# Patient Record
Sex: MALE | Race: WHITE | NOT HISPANIC OR LATINO | Employment: FULL TIME | URBAN - METROPOLITAN AREA
[De-identification: names, ages, dates, MRNs, and addresses within clinical notes are randomized per-mention and may not be internally consistent; named-entity substitution may affect disease eponyms.]

---

## 2017-01-07 RX ORDER — VALACYCLOVIR HYDROCHLORIDE 500 MG/1
TABLET, FILM COATED ORAL
Qty: 6 TABLET | Refills: 2 | Status: SHIPPED | OUTPATIENT
Start: 2017-01-07 | End: 2017-05-21 | Stop reason: SDUPTHER

## 2017-01-10 ENCOUNTER — HOSPITAL ENCOUNTER (OUTPATIENT)
Dept: SLEEP MEDICINE | Facility: HOSPITAL | Age: 52
Discharge: HOME | End: 2017-01-10
Attending: PSYCHIATRY & NEUROLOGY | Admitting: PSYCHIATRY & NEUROLOGY

## 2017-01-10 PROCEDURE — G0463 HOSPITAL OUTPT CLINIC VISIT: HCPCS

## 2017-01-13 RX ORDER — SITAGLIPTIN AND METFORMIN HYDROCHLORIDE 1000; 50 MG/1; MG/1
TABLET, FILM COATED ORAL
Qty: 60 TABLET | Refills: 5 | Status: SHIPPED | OUTPATIENT
Start: 2017-01-13 | End: 2017-07-30 | Stop reason: SDUPTHER

## 2017-01-18 NOTE — PROGRESS NOTES
DATE OF VISIT:   01/10/2017    HISTORY:  Patient has severe obstructive sleep apnea syndrome.  Polysomnography in the past has revealed apnea-hypopnea index of 94 and minimum SpO2 of 69%. Patient was hypoxic for 55% of the study time. The patient had hypersomnia with [place] Sleepiness Scale score of 11.     The patient was successfully treated with CPAP therapy and the patient is now on auto CPAP therapy with mean pressure of 8.9 cmH20 and AHI down to 1.9. Compliance data indicates excellent compliance with 96.7% usage for more than 4 hours with an average usage of 7 hours and 11 minutes. The patient is compliant and benefiting from it. The patient's [place] Sleepiness Scale score is down to 3.     PHYSICAL EXAMINATION:  VITAL SIGNS: Weight 283 pounds, height 5 feet 10 inches, body mass index 41, blood pressure 120/75, heart rate 70.   HEENT: Narrow oropharynx. Mallampati class III   NECK: Supple. No bruits.   CARDIAC: Normal.   LUNGS: Clear to auscultation.   EXTREMITIES: No edema.     IMPRESSION: Patient with severe obstructive sleep apnea syndrome successfully treated with auto CPAP therapy and the patient is compliant and benefiting from it.     RECOMMENDATIONS: Continue present auto CPAP. Followup 1 year.       BYRON Dooley:rae  D:   01/17/2017 12:30:18  T:   01/18/2017 09:59:09  Job ID:   [id number]  Document ID:   [id number]  cc:

## 2017-01-19 RX ORDER — GLIMEPIRIDE 1 MG/1
TABLET ORAL
Qty: 30 TABLET | Refills: 5 | Status: SHIPPED | OUTPATIENT
Start: 2017-01-19 | End: 2017-02-15 | Stop reason: SDUPTHER

## 2017-02-10 DIAGNOSIS — I15.9 SECONDARY HYPERTENSION: ICD-10-CM

## 2017-02-10 DIAGNOSIS — E11.9 CONTROLLED TYPE 2 DIABETES MELLITUS WITHOUT COMPLICATION, WITHOUT LONG-TERM CURRENT USE OF INSULIN (HCC): ICD-10-CM

## 2017-02-10 DIAGNOSIS — E78.5 HYPERLIPIDEMIA, UNSPECIFIED HYPERLIPIDEMIA TYPE: ICD-10-CM

## 2017-02-10 DIAGNOSIS — E78.5 HYPERLIPIDEMIA, UNSPECIFIED HYPERLIPIDEMIA TYPE: Primary | ICD-10-CM

## 2017-02-13 ENCOUNTER — CONVERSION ENCOUNTER (OUTPATIENT)
Dept: FAMILY MEDICINE CLINIC | Facility: CLINIC | Age: 52
End: 2017-02-13

## 2017-02-13 LAB
ALBUMIN SERPL-MCNC: 4.6 G/DL (ref 3.5–5.2)
ALBUMIN/GLOB SERPL: 1.8 G/DL
ALP SERPL-CCNC: 47 U/L (ref 39–117)
ALT SERPL-CCNC: 30 U/L (ref 1–41)
AST SERPL-CCNC: 18 U/L (ref 1–40)
BASOPHILS # BLD AUTO: 0.03 10*3/MM3 (ref 0–0.2)
BASOPHILS NFR BLD AUTO: 0.3 % (ref 0–1.5)
BILIRUB SERPL-MCNC: 0.6 MG/DL (ref 0.1–1.2)
BUN SERPL-MCNC: 16 MG/DL (ref 6–20)
BUN/CREAT SERPL: 15.8 (ref 7–25)
CALCIUM SERPL-MCNC: 9.6 MG/DL (ref 8.6–10.5)
CHLORIDE SERPL-SCNC: 98 MMOL/L (ref 98–107)
CHOLEST SERPL-MCNC: 183 MG/DL (ref 0–200)
CO2 SERPL-SCNC: 27.5 MMOL/L (ref 22–29)
CREAT SERPL-MCNC: 1.01 MG/DL (ref 0.76–1.27)
EOSINOPHIL # BLD AUTO: 0.27 10*3/MM3 (ref 0–0.7)
EOSINOPHIL NFR BLD AUTO: 3.1 % (ref 0.3–6.2)
ERYTHROCYTE [DISTWIDTH] IN BLOOD BY AUTOMATED COUNT: 12.9 % (ref 11.5–14.5)
GLOBULIN SER CALC-MCNC: 2.6 GM/DL
GLUCOSE SERPL-MCNC: 152 MG/DL (ref 65–99)
HBA1C MFR BLD: 7.02 % (ref 4.8–5.6)
HCT VFR BLD AUTO: 44.1 % (ref 40.4–52.2)
HDLC SERPL-MCNC: 84 MG/DL (ref 40–60)
HGB BLD-MCNC: 14.5 G/DL (ref 13.7–17.6)
IMM GRANULOCYTES # BLD: 0.03 10*3/MM3 (ref 0–0.03)
IMM GRANULOCYTES NFR BLD: 0.3 % (ref 0–0.5)
LDLC SERPL CALC-MCNC: 63 MG/DL (ref 0–100)
LDLC/HDLC SERPL: 0.75 {RATIO}
LYMPHOCYTES # BLD AUTO: 2.32 10*3/MM3 (ref 0.9–4.8)
LYMPHOCYTES NFR BLD AUTO: 26.6 % (ref 19.6–45.3)
MCH RBC QN AUTO: 28.9 PG (ref 27–32.7)
MCHC RBC AUTO-ENTMCNC: 32.9 G/DL (ref 32.6–36.4)
MCV RBC AUTO: 88 FL (ref 79.8–96.2)
MONOCYTES # BLD AUTO: 0.72 10*3/MM3 (ref 0.2–1.2)
MONOCYTES NFR BLD AUTO: 8.3 % (ref 5–12)
NEUTROPHILS # BLD AUTO: 5.34 10*3/MM3 (ref 1.9–8.1)
NEUTROPHILS NFR BLD AUTO: 61.4 % (ref 42.7–76)
PLATELET # BLD AUTO: 243 10*3/MM3 (ref 140–500)
POTASSIUM SERPL-SCNC: 4.5 MMOL/L (ref 3.5–5.2)
PROT SERPL-MCNC: 7.2 G/DL (ref 6–8.5)
RBC # BLD AUTO: 5.01 10*6/MM3 (ref 4.6–6)
SODIUM SERPL-SCNC: 137 MMOL/L (ref 136–145)
TRIGL SERPL-MCNC: 180 MG/DL (ref 0–150)
VLDLC SERPL CALC-MCNC: 36 MG/DL (ref 5–40)
WBC # BLD AUTO: 8.71 10*3/MM3 (ref 4.5–10.7)

## 2017-02-15 ENCOUNTER — OFFICE VISIT (OUTPATIENT)
Dept: FAMILY MEDICINE CLINIC | Facility: CLINIC | Age: 52
End: 2017-02-15

## 2017-02-15 VITALS
OXYGEN SATURATION: 97 % | HEART RATE: 87 BPM | DIASTOLIC BLOOD PRESSURE: 72 MMHG | SYSTOLIC BLOOD PRESSURE: 122 MMHG | TEMPERATURE: 97.9 F | WEIGHT: 291 LBS | HEIGHT: 70 IN | BODY MASS INDEX: 41.66 KG/M2

## 2017-02-15 DIAGNOSIS — E11.9 TYPE 2 DIABETES MELLITUS WITHOUT COMPLICATION, WITHOUT LONG-TERM CURRENT USE OF INSULIN (HCC): Primary | ICD-10-CM

## 2017-02-15 DIAGNOSIS — R74.8 ELEVATED LIVER ENZYMES: ICD-10-CM

## 2017-02-15 DIAGNOSIS — I10 ESSENTIAL HYPERTENSION: ICD-10-CM

## 2017-02-15 DIAGNOSIS — E78.5 HYPERLIPIDEMIA, UNSPECIFIED HYPERLIPIDEMIA TYPE: ICD-10-CM

## 2017-02-15 PROCEDURE — 99214 OFFICE O/P EST MOD 30 MIN: CPT | Performed by: INTERNAL MEDICINE

## 2017-02-15 RX ORDER — GLIMEPIRIDE 2 MG/1
2 TABLET ORAL
Qty: 30 TABLET | Refills: 5 | Status: SHIPPED | OUTPATIENT
Start: 2017-02-15 | End: 2017-06-09 | Stop reason: ALTCHOICE

## 2017-02-15 NOTE — PROGRESS NOTES
Subjective   Rashid Henderson is a 51 y.o. male who comes in today for   Chief Complaint   Patient presents with   • Diabetes     follow up   .    History of Present Illness   Here for f/u on NIDDM and has had a dry cough since he got sick over new years.  Cough has improved slightly with the mucinex dm.  Voice is raspy.  Gets somewhat out of breath and then he coughs.  His muscles were sore at one point.  Sniffling.  No fever.  Clear RN.    The following portions of the patient's history were reviewed and updated as appropriate: allergies, current medications, past family history, past medical history, past social history, past surgical history and problem list.    Review of Systems   Constitutional: Negative for fever.   HENT: Positive for rhinorrhea.    Respiratory: Positive for cough. Negative for shortness of breath.    Endocrine:        Sugars running 150-160's at home       Vitals:    02/15/17 0906   BP: 122/72   Pulse: 87   Temp: 97.9 °F (36.6 °C)   SpO2: 97%       Objective   Physical Exam   Constitutional: He is oriented to person, place, and time. He appears well-developed and well-nourished.   HENT:   Head: Normocephalic and atraumatic.   Right Ear: External ear normal.   Left Ear: External ear normal.   Mouth/Throat: Oropharynx is clear and moist.   Eyes: Conjunctivae are normal.   Neck: Neck supple.   Cardiovascular: Normal rate, regular rhythm and normal heart sounds.    Pulmonary/Chest: Effort normal and breath sounds normal.   Abdominal: Soft. Bowel sounds are normal.   Neurological: He is alert and oriented to person, place, and time.   Skin: Skin is warm.   Psychiatric: He has a normal mood and affect. His behavior is normal. Judgment and thought content normal.   Nursing note and vitals reviewed.      Assessment/Plan   Rashid was seen today for diabetes.    Diagnoses and all orders for this visit:    Type 2 diabetes mellitus without complication, without long-term current use of insulin    Essential  hypertension    Hyperlipidemia, unspecified hyperlipidemia type    Elevated liver enzymes    Other orders  -     glimepiride (AMARYL) 2 MG tablet; Take 1 tablet by mouth Every Morning Before Breakfast.    dulera 100/5 2 puffs bid trial for the cough and if it doesn't help, we will start zithromax  Increase the amaryl to 2mg qd and continue monitoring his sugars.  He will call me in a few weeks if they are still elevated and will increase the amaryl to 4mg qd  Diet and exercise discussed at length and he is aware of necessary changes  Labs reviewed and copy given to patient               I have asked for the patient to return to clinic in 4month(s).

## 2017-05-23 RX ORDER — VALACYCLOVIR HYDROCHLORIDE 500 MG/1
TABLET, FILM COATED ORAL
Qty: 6 TABLET | Refills: 2 | Status: SHIPPED | OUTPATIENT
Start: 2017-05-23 | End: 2017-10-02 | Stop reason: SDUPTHER

## 2017-06-02 DIAGNOSIS — I10 ESSENTIAL HYPERTENSION: Primary | ICD-10-CM

## 2017-06-02 DIAGNOSIS — I10 ESSENTIAL HYPERTENSION: ICD-10-CM

## 2017-06-02 DIAGNOSIS — E78.5 HYPERLIPIDEMIA, UNSPECIFIED HYPERLIPIDEMIA TYPE: ICD-10-CM

## 2017-06-02 DIAGNOSIS — E11.9 TYPE 2 DIABETES MELLITUS WITHOUT COMPLICATION, WITHOUT LONG-TERM CURRENT USE OF INSULIN (HCC): ICD-10-CM

## 2017-06-06 LAB
ALBUMIN SERPL-MCNC: 4.1 G/DL (ref 3.5–5.2)
ALBUMIN/GLOB SERPL: 1.4 G/DL
ALP SERPL-CCNC: 38 U/L (ref 39–117)
ALT SERPL-CCNC: 47 U/L (ref 1–41)
AST SERPL-CCNC: 24 U/L (ref 1–40)
BASOPHILS # BLD AUTO: 0.02 10*3/MM3 (ref 0–0.2)
BASOPHILS NFR BLD AUTO: 0.3 % (ref 0–1.5)
BILIRUB SERPL-MCNC: 0.5 MG/DL (ref 0.1–1.2)
BUN SERPL-MCNC: 18 MG/DL (ref 6–20)
BUN/CREAT SERPL: 18.6 (ref 7–25)
CALCIUM SERPL-MCNC: 9.5 MG/DL (ref 8.6–10.5)
CHLORIDE SERPL-SCNC: 99 MMOL/L (ref 98–107)
CHOLEST SERPL-MCNC: 179 MG/DL (ref 0–200)
CO2 SERPL-SCNC: 22.7 MMOL/L (ref 22–29)
CREAT SERPL-MCNC: 0.97 MG/DL (ref 0.76–1.27)
EOSINOPHIL # BLD AUTO: 0.23 10*3/MM3 (ref 0–0.7)
EOSINOPHIL NFR BLD AUTO: 3.7 % (ref 0.3–6.2)
ERYTHROCYTE [DISTWIDTH] IN BLOOD BY AUTOMATED COUNT: 13.4 % (ref 11.5–14.5)
GLOBULIN SER CALC-MCNC: 2.9 GM/DL
GLUCOSE SERPL-MCNC: 127 MG/DL (ref 65–99)
HBA1C MFR BLD: 7.1 % (ref 4.8–5.6)
HCT VFR BLD AUTO: 42.3 % (ref 40.4–52.2)
HDLC SERPL-MCNC: 89 MG/DL (ref 40–60)
HGB BLD-MCNC: 14.2 G/DL (ref 13.7–17.6)
IMM GRANULOCYTES # BLD: 0.02 10*3/MM3 (ref 0–0.03)
IMM GRANULOCYTES NFR BLD: 0.3 % (ref 0–0.5)
LDLC SERPL CALC-MCNC: 64 MG/DL (ref 0–100)
LDLC/HDLC SERPL: 0.72 {RATIO}
LYMPHOCYTES # BLD AUTO: 2.2 10*3/MM3 (ref 0.9–4.8)
LYMPHOCYTES NFR BLD AUTO: 35.5 % (ref 19.6–45.3)
MCH RBC QN AUTO: 29.8 PG (ref 27–32.7)
MCHC RBC AUTO-ENTMCNC: 33.6 G/DL (ref 32.6–36.4)
MCV RBC AUTO: 88.9 FL (ref 79.8–96.2)
MONOCYTES # BLD AUTO: 0.49 10*3/MM3 (ref 0.2–1.2)
MONOCYTES NFR BLD AUTO: 7.9 % (ref 5–12)
NEUTROPHILS # BLD AUTO: 3.23 10*3/MM3 (ref 1.9–8.1)
NEUTROPHILS NFR BLD AUTO: 52.3 % (ref 42.7–76)
PLATELET # BLD AUTO: 237 10*3/MM3 (ref 140–500)
POTASSIUM SERPL-SCNC: 4.2 MMOL/L (ref 3.5–5.2)
PROT SERPL-MCNC: 7 G/DL (ref 6–8.5)
RBC # BLD AUTO: 4.76 10*6/MM3 (ref 4.6–6)
SODIUM SERPL-SCNC: 138 MMOL/L (ref 136–145)
TRIGL SERPL-MCNC: 129 MG/DL (ref 0–150)
VLDLC SERPL CALC-MCNC: 25.8 MG/DL (ref 5–40)
WBC # BLD AUTO: 6.19 10*3/MM3 (ref 4.5–10.7)

## 2017-06-09 ENCOUNTER — OFFICE VISIT (OUTPATIENT)
Dept: FAMILY MEDICINE CLINIC | Facility: CLINIC | Age: 52
End: 2017-06-09

## 2017-06-09 VITALS
HEIGHT: 70 IN | BODY MASS INDEX: 41.16 KG/M2 | SYSTOLIC BLOOD PRESSURE: 128 MMHG | WEIGHT: 287.5 LBS | HEART RATE: 68 BPM | OXYGEN SATURATION: 98 % | TEMPERATURE: 98.6 F | DIASTOLIC BLOOD PRESSURE: 80 MMHG | RESPIRATION RATE: 18 BRPM

## 2017-06-09 DIAGNOSIS — R74.8 ELEVATED LIVER ENZYMES: ICD-10-CM

## 2017-06-09 DIAGNOSIS — E78.5 HYPERLIPIDEMIA, UNSPECIFIED HYPERLIPIDEMIA TYPE: ICD-10-CM

## 2017-06-09 DIAGNOSIS — E11.9 TYPE 2 DIABETES MELLITUS WITHOUT COMPLICATION, WITHOUT LONG-TERM CURRENT USE OF INSULIN (HCC): Primary | ICD-10-CM

## 2017-06-09 DIAGNOSIS — I10 ESSENTIAL HYPERTENSION: ICD-10-CM

## 2017-06-09 PROCEDURE — 99214 OFFICE O/P EST MOD 30 MIN: CPT | Performed by: INTERNAL MEDICINE

## 2017-06-09 NOTE — PROGRESS NOTES
Subjective   Rashid Henderson is a 51 y.o. male who comes in today for   Chief Complaint   Patient presents with   • Diabetes   .    History of Present Illness   Here for f/u on NIDDM, HTN, HL.  Does have sweet tooth and has been eating candy.  Not exercising as much as would like to but is still hitting the gym.  Right elbow pain on the outer part mainly with certain movements.  Hasn't hurt in 3 weeks actually.  Also some right shoulder pain that occurs with movement.  Hurts to raise shoulder.  No loss of strength.  Is right handed.  Does play piano often.    The following portions of the patient's history were reviewed and updated as appropriate: allergies, current medications, past family history, past medical history, past social history, past surgical history and problem list.    Review of Systems   Constitutional: Negative.    Musculoskeletal: Positive for arthralgias (right elbow and right shoulder).       Vitals:    06/09/17 0811   BP: 128/80   Pulse: 68   Resp: 18   Temp: 98.6 °F (37 °C)   SpO2: 98%       Objective   Physical Exam   Constitutional: He is oriented to person, place, and time. He appears well-developed and well-nourished.   HENT:   Head: Normocephalic and atraumatic.   Right Ear: External ear normal.   Left Ear: External ear normal.   Mouth/Throat: Oropharynx is clear and moist.   Eyes: Conjunctivae are normal.   Neck: Neck supple.   Cardiovascular: Normal rate, regular rhythm and normal heart sounds.    Pulmonary/Chest: Effort normal and breath sounds normal.   Abdominal: Soft. Bowel sounds are normal.   Neurological: He is alert and oriented to person, place, and time.   Skin: Skin is warm.   Psychiatric: He has a normal mood and affect. His behavior is normal. Judgment and thought content normal.   Nursing note and vitals reviewed.      Assessment/Plan   Rashid was seen today for diabetes.    Diagnoses and all orders for this visit:    Type 2 diabetes mellitus without complication, without  long-term current use of insulin    Hyperlipidemia, unspecified hyperlipidemia type    Essential hypertension    Elevated liver enzymes      a1c increasing and is above goal of 7%.  Will start jardiance 10mg qd due to its additive benefits of weight loss and lowering bp and lowering a1c.  Side effects discussed and he knows to increase his hydration.    Recheck labs in 4 mo  Suggested an elbow velcro strap for presumed lateral epicondylitis  He will let me know about seeing ortho if it or the shoulder worsen  Will have him stop the amaryl to avoid possible hypoglycemia           I have asked for the patient to return to clinic in 4month(s).

## 2017-06-27 ENCOUNTER — TELEPHONE (OUTPATIENT)
Dept: FAMILY MEDICINE CLINIC | Facility: CLINIC | Age: 52
End: 2017-06-27

## 2017-06-27 NOTE — TELEPHONE ENCOUNTER
CAROLINE lopez needed a PA. i completed one through [email or web address]     Approved through 6/27/2018    CaseID: [id number]   [email or web address] id [id number]     Called [facility]- spoke with pharmacist   Also faxed approval

## 2017-07-09 RX ORDER — LISINOPRIL 10 MG/1
TABLET ORAL
Qty: 30 TABLET | Refills: 6 | Status: SHIPPED | OUTPATIENT
Start: 2017-07-09 | End: 2018-02-11 | Stop reason: SDUPTHER

## 2017-07-31 RX ORDER — SITAGLIPTIN AND METFORMIN HYDROCHLORIDE 1000; 50 MG/1; MG/1
TABLET, FILM COATED ORAL
Qty: 60 TABLET | Refills: 5 | Status: SHIPPED | OUTPATIENT
Start: 2017-07-31 | End: 2018-02-14 | Stop reason: SDUPTHER

## 2017-10-03 RX ORDER — VALACYCLOVIR HYDROCHLORIDE 500 MG/1
TABLET, FILM COATED ORAL
Qty: 6 TABLET | Refills: 2 | Status: SHIPPED | OUTPATIENT
Start: 2017-10-03 | End: 2018-11-09 | Stop reason: SDUPTHER

## 2017-10-05 DIAGNOSIS — E11.9 TYPE 2 DIABETES MELLITUS WITHOUT COMPLICATION, WITHOUT LONG-TERM CURRENT USE OF INSULIN (HCC): ICD-10-CM

## 2017-10-05 DIAGNOSIS — E78.5 HYPERLIPIDEMIA, UNSPECIFIED HYPERLIPIDEMIA TYPE: ICD-10-CM

## 2017-10-05 DIAGNOSIS — I15.9 SECONDARY HYPERTENSION: Primary | ICD-10-CM

## 2017-10-05 LAB
ALBUMIN SERPL-MCNC: 4.2 G/DL (ref 3.5–5.2)
ALBUMIN/GLOB SERPL: 1.4 G/DL
ALP SERPL-CCNC: 42 U/L (ref 39–117)
ALT SERPL-CCNC: 35 U/L (ref 1–41)
AST SERPL-CCNC: 19 U/L (ref 1–40)
BASOPHILS # BLD AUTO: 0.03 10*3/MM3 (ref 0–0.2)
BASOPHILS NFR BLD AUTO: 0.5 % (ref 0–1.5)
BILIRUB SERPL-MCNC: 0.5 MG/DL (ref 0.1–1.2)
BUN SERPL-MCNC: 22 MG/DL (ref 6–20)
BUN/CREAT SERPL: 21 (ref 7–25)
CALCIUM SERPL-MCNC: 9.3 MG/DL (ref 8.6–10.5)
CHLORIDE SERPL-SCNC: 99 MMOL/L (ref 98–107)
CHOLEST SERPL-MCNC: 183 MG/DL (ref 0–200)
CO2 SERPL-SCNC: 23.8 MMOL/L (ref 22–29)
CREAT SERPL-MCNC: 1.05 MG/DL (ref 0.76–1.27)
EOSINOPHIL # BLD AUTO: 0.46 10*3/MM3 (ref 0–0.7)
EOSINOPHIL NFR BLD AUTO: 7.7 % (ref 0.3–6.2)
ERYTHROCYTE [DISTWIDTH] IN BLOOD BY AUTOMATED COUNT: 12.6 % (ref 11.5–14.5)
GLOBULIN SER CALC-MCNC: 2.9 GM/DL
GLUCOSE SERPL-MCNC: 146 MG/DL (ref 65–99)
HBA1C MFR BLD: 7.2 % (ref 4.8–5.6)
HCT VFR BLD AUTO: 45.7 % (ref 40.4–52.2)
HDLC SERPL-MCNC: 79 MG/DL (ref 40–60)
HGB BLD-MCNC: 15.1 G/DL (ref 13.7–17.6)
IMM GRANULOCYTES # BLD: 0.02 10*3/MM3 (ref 0–0.03)
IMM GRANULOCYTES NFR BLD: 0.3 % (ref 0–0.5)
LDLC SERPL CALC-MCNC: 69 MG/DL (ref 0–100)
LDLC/HDLC SERPL: 0.87 {RATIO}
LYMPHOCYTES # BLD AUTO: 2.05 10*3/MM3 (ref 0.9–4.8)
LYMPHOCYTES NFR BLD AUTO: 34.2 % (ref 19.6–45.3)
MCH RBC QN AUTO: 30.1 PG (ref 27–32.7)
MCHC RBC AUTO-ENTMCNC: 33 G/DL (ref 32.6–36.4)
MCV RBC AUTO: 91 FL (ref 79.8–96.2)
MONOCYTES # BLD AUTO: 0.43 10*3/MM3 (ref 0.2–1.2)
MONOCYTES NFR BLD AUTO: 7.2 % (ref 5–12)
NEUTROPHILS # BLD AUTO: 3 10*3/MM3 (ref 1.9–8.1)
NEUTROPHILS NFR BLD AUTO: 50.1 % (ref 42.7–76)
PLATELET # BLD AUTO: 204 10*3/MM3 (ref 140–500)
POTASSIUM SERPL-SCNC: 4.4 MMOL/L (ref 3.5–5.2)
PROT SERPL-MCNC: 7.1 G/DL (ref 6–8.5)
RBC # BLD AUTO: 5.02 10*6/MM3 (ref 4.6–6)
SODIUM SERPL-SCNC: 137 MMOL/L (ref 136–145)
TRIGL SERPL-MCNC: 175 MG/DL (ref 0–150)
VLDLC SERPL CALC-MCNC: 35 MG/DL (ref 5–40)
WBC # BLD AUTO: 5.99 10*3/MM3 (ref 4.5–10.7)

## 2017-10-11 ENCOUNTER — OFFICE VISIT (OUTPATIENT)
Dept: FAMILY MEDICINE CLINIC | Facility: CLINIC | Age: 52
End: 2017-10-11

## 2017-10-11 VITALS
RESPIRATION RATE: 18 BRPM | OXYGEN SATURATION: 94 % | HEIGHT: 70 IN | HEART RATE: 84 BPM | SYSTOLIC BLOOD PRESSURE: 130 MMHG | BODY MASS INDEX: 39.6 KG/M2 | WEIGHT: 276.6 LBS | DIASTOLIC BLOOD PRESSURE: 80 MMHG | TEMPERATURE: 98.8 F

## 2017-10-11 DIAGNOSIS — R74.8 ELEVATED LIVER ENZYMES: ICD-10-CM

## 2017-10-11 DIAGNOSIS — E11.9 TYPE 2 DIABETES MELLITUS WITHOUT COMPLICATION, WITHOUT LONG-TERM CURRENT USE OF INSULIN (HCC): Primary | ICD-10-CM

## 2017-10-11 DIAGNOSIS — I10 ESSENTIAL HYPERTENSION: ICD-10-CM

## 2017-10-11 DIAGNOSIS — E78.5 HYPERLIPIDEMIA, UNSPECIFIED HYPERLIPIDEMIA TYPE: ICD-10-CM

## 2017-10-11 PROCEDURE — 99214 OFFICE O/P EST MOD 30 MIN: CPT | Performed by: INTERNAL MEDICINE

## 2017-10-11 NOTE — PROGRESS NOTES
Subjective   Rashid Henderson is a 51 y.o. male who comes in today for   Chief Complaint   Patient presents with   • Diabetes   .    History of Present Illness   Her for f/u on NIDDM with increasing a1c and sugars. He hasn't been exercising.  He has been taking jardiance 10mg qd and janumet 50/1000 2 /day.  Had poison ivy on forearms and thought may be the jardiance so he stopped it for about a month and just restarted the jardiance for past 2 weeks.  -140 in the mornings.  Has lost 11 pounds on the jardiance.  He stopped the amaryl when he started the jardiance.  Does notice the increase in urination.  No nocturia.      The following portions of the patient's history were reviewed and updated as appropriate: allergies, current medications, past family history, past medical history, past social history, past surgical history and problem list.    Review of Systems   Constitutional:        Weight loss from the jardiance   Skin: Negative for rash.       Vitals:    10/11/17 0802   BP: 130/80   Pulse: 84   Resp: 18   Temp: 98.8 °F (37.1 °C)   SpO2: 94%       Objective   Physical Exam   Constitutional: He is oriented to person, place, and time. He appears well-developed and well-nourished.   HENT:   Head: Normocephalic and atraumatic.   Right Ear: External ear normal.   Left Ear: External ear normal.   Mouth/Throat: Oropharynx is clear and moist.   Eyes: Conjunctivae are normal.   Neck: Neck supple.   Cardiovascular: Normal rate, regular rhythm and normal heart sounds.    No bruits   Pulmonary/Chest: Effort normal and breath sounds normal. No respiratory distress. He has no wheezes. He has no rales.   Abdominal: Soft. Bowel sounds are normal. He exhibits no distension and no mass. There is no tenderness.   Lymphadenopathy:     He has no cervical adenopathy.   Neurological: He is alert and oriented to person, place, and time.   Skin: Skin is warm.   Psychiatric: He has a normal mood and affect. His behavior is normal.  Judgment and thought content normal.   Nursing note and vitals reviewed.      Assessment/Plan   Rashid was seen today for diabetes.    Diagnoses and all orders for this visit:    Type 2 diabetes mellitus without complication, without long-term current use of insulin    Essential hypertension    Hyperlipidemia, unspecified hyperlipidemia type    Elevated liver enzymes      Restart exercise. Imperative to do this. Stressed to patient importance of diet and exercise.  He will continue jardiance and janumet for now.  Discussed victoza .  He wants to wait and try diet and exercise first  Labs reveiwed  Continue lisinopril for HTN  Cholesterol to goal without statin other than TG elev this time.  Diet first                 I have asked for the patient to return to clinic in 6month(s).

## 2018-01-09 ENCOUNTER — OFFICE VISIT (OUTPATIENT)
Dept: SLEEP MEDICINE | Facility: HOSPITAL | Age: 53
End: 2018-01-09
Attending: PSYCHIATRY & NEUROLOGY

## 2018-01-09 VITALS
BODY MASS INDEX: 37.94 KG/M2 | DIASTOLIC BLOOD PRESSURE: 77 MMHG | HEIGHT: 71 IN | WEIGHT: 271 LBS | SYSTOLIC BLOOD PRESSURE: 145 MMHG | HEART RATE: 85 BPM

## 2018-01-09 DIAGNOSIS — G47.33 OSA (OBSTRUCTIVE SLEEP APNEA): Primary | ICD-10-CM

## 2018-01-09 DIAGNOSIS — G47.10 HYPERSOMNIA: ICD-10-CM

## 2018-01-09 PROCEDURE — G0463 HOSPITAL OUTPT CLINIC VISIT: HCPCS

## 2018-01-09 NOTE — PROGRESS NOTES
"Rashid Henderson  1965  [id number]     DATE OF VISIT:1/9/2018   HISTORY:  Patient has severe obstructive sleep apnea syndrome.  Polysomnography in the past has revealed apnea-hypopnea index of 94 and minimum SpO2 of 69%. Patient was hypoxic for 55% of the study time. The patient had hypersomnia with [place] Sleepiness Scale score of 11.     The patient was successfully treated with CPAP therapy and the patient is now on auto CPAP therapy with mean pressure of 8.6 cmH20 and AHI down to 1.6. Compliance data indicates excellent compliance with 93.3% usage for more than 4 hours with an average usage of 7 hours and 17 minutes. The patient is compliant and benefiting from it. The patient's [place] Sleepiness Scale score is down to 2.     PHYSICAL EXAMINATION:  Vitals:    01/09/18 0700   BP: 145/77   Pulse: 85   Weight: 123 kg (271 lb)   Height: 179.1 cm (70.5\")   BMI 38.33  HEENT: Narrow oropharynx. Mallampati class III   NECK: Supple. No bruits.   CARDIAC: Normal.   LUNGS: Clear to auscultation.   EXTREMITIES: No edema.     IMPRESSION: Patient with severe obstructive sleep apnea syndrome successfully treated with auto CPAP therapy and the patient is compliant and benefiting from it.     RECOMMENDATIONS: Continue present auto CPAP. Followup 1 year.       Andrew Ni M.D.  1/9/2018         "

## 2018-01-11 RX ORDER — EMPAGLIFLOZIN 10 MG/1
TABLET, FILM COATED ORAL
Qty: 30 TABLET | Refills: 5 | Status: SHIPPED | OUTPATIENT
Start: 2018-01-11 | End: 2018-07-16 | Stop reason: SDUPTHER

## 2018-02-12 RX ORDER — LISINOPRIL 10 MG/1
TABLET ORAL
Qty: 30 TABLET | Refills: 6 | Status: SHIPPED | OUTPATIENT
Start: 2018-02-12 | End: 2018-09-13 | Stop reason: SDUPTHER

## 2018-02-15 RX ORDER — SITAGLIPTIN AND METFORMIN HYDROCHLORIDE 1000; 50 MG/1; MG/1
TABLET, FILM COATED ORAL
Qty: 60 TABLET | Refills: 5 | Status: SHIPPED | OUTPATIENT
Start: 2018-02-15 | End: 2018-12-27 | Stop reason: SDUPTHER

## 2018-02-23 DIAGNOSIS — E11.9 TYPE 2 DIABETES MELLITUS WITHOUT COMPLICATION, WITHOUT LONG-TERM CURRENT USE OF INSULIN (HCC): ICD-10-CM

## 2018-02-23 DIAGNOSIS — I15.9 SECONDARY HYPERTENSION: ICD-10-CM

## 2018-02-23 DIAGNOSIS — E78.5 HYPERLIPIDEMIA, UNSPECIFIED HYPERLIPIDEMIA TYPE: Primary | ICD-10-CM

## 2018-02-26 LAB
ALBUMIN SERPL-MCNC: 4.5 G/DL (ref 3.5–5.2)
ALBUMIN/GLOB SERPL: 1.6 G/DL
ALP SERPL-CCNC: 43 U/L (ref 39–117)
ALT SERPL-CCNC: 33 U/L (ref 1–41)
AST SERPL-CCNC: 25 U/L (ref 1–40)
BASOPHILS # BLD AUTO: 0.02 10*3/MM3 (ref 0–0.2)
BASOPHILS NFR BLD AUTO: 0.3 % (ref 0–1.5)
BILIRUB SERPL-MCNC: 0.6 MG/DL (ref 0.1–1.2)
BUN SERPL-MCNC: 22 MG/DL (ref 6–20)
BUN/CREAT SERPL: 22.2 (ref 7–25)
CALCIUM SERPL-MCNC: 9.4 MG/DL (ref 8.6–10.5)
CHLORIDE SERPL-SCNC: 98 MMOL/L (ref 98–107)
CHOLEST SERPL-MCNC: 202 MG/DL (ref 0–200)
CO2 SERPL-SCNC: 24.3 MMOL/L (ref 22–29)
CREAT SERPL-MCNC: 0.99 MG/DL (ref 0.76–1.27)
EOSINOPHIL # BLD AUTO: 0.37 10*3/MM3 (ref 0–0.7)
EOSINOPHIL NFR BLD AUTO: 5.3 % (ref 0.3–6.2)
ERYTHROCYTE [DISTWIDTH] IN BLOOD BY AUTOMATED COUNT: 13.1 % (ref 11.5–14.5)
GFR SERPLBLD CREATININE-BSD FMLA CKD-EPI: 80 ML/MIN/1.73
GFR SERPLBLD CREATININE-BSD FMLA CKD-EPI: 97 ML/MIN/1.73
GLOBULIN SER CALC-MCNC: 2.8 GM/DL
GLUCOSE SERPL-MCNC: 136 MG/DL (ref 65–99)
HBA1C MFR BLD: 7.63 % (ref 4.8–5.6)
HCT VFR BLD AUTO: 47 % (ref 40.4–52.2)
HDLC SERPL-MCNC: 87 MG/DL (ref 40–60)
HGB BLD-MCNC: 15.5 G/DL (ref 13.7–17.6)
IMM GRANULOCYTES # BLD: 0.02 10*3/MM3 (ref 0–0.03)
IMM GRANULOCYTES NFR BLD: 0.3 % (ref 0–0.5)
LDLC SERPL CALC-MCNC: 88 MG/DL (ref 0–100)
LDLC/HDLC SERPL: 1.02 {RATIO}
LYMPHOCYTES # BLD AUTO: 2.26 10*3/MM3 (ref 0.9–4.8)
LYMPHOCYTES NFR BLD AUTO: 32.3 % (ref 19.6–45.3)
MCH RBC QN AUTO: 30.1 PG (ref 27–32.7)
MCHC RBC AUTO-ENTMCNC: 33 G/DL (ref 32.6–36.4)
MCV RBC AUTO: 91.3 FL (ref 79.8–96.2)
MONOCYTES # BLD AUTO: 0.44 10*3/MM3 (ref 0.2–1.2)
MONOCYTES NFR BLD AUTO: 6.3 % (ref 5–12)
NEUTROPHILS # BLD AUTO: 3.89 10*3/MM3 (ref 1.9–8.1)
NEUTROPHILS NFR BLD AUTO: 55.5 % (ref 42.7–76)
PLATELET # BLD AUTO: 230 10*3/MM3 (ref 140–500)
POTASSIUM SERPL-SCNC: 4.7 MMOL/L (ref 3.5–5.2)
PROT SERPL-MCNC: 7.3 G/DL (ref 6–8.5)
RBC # BLD AUTO: 5.15 10*6/MM3 (ref 4.6–6)
SODIUM SERPL-SCNC: 135 MMOL/L (ref 136–145)
TRIGL SERPL-MCNC: 133 MG/DL (ref 0–150)
VLDLC SERPL CALC-MCNC: 26.6 MG/DL (ref 5–40)
WBC # BLD AUTO: 7 10*3/MM3 (ref 4.5–10.7)

## 2018-02-28 ENCOUNTER — OFFICE VISIT (OUTPATIENT)
Dept: FAMILY MEDICINE CLINIC | Facility: CLINIC | Age: 53
End: 2018-02-28

## 2018-02-28 VITALS
RESPIRATION RATE: 18 BRPM | WEIGHT: 272.1 LBS | HEIGHT: 71 IN | DIASTOLIC BLOOD PRESSURE: 70 MMHG | OXYGEN SATURATION: 96 % | SYSTOLIC BLOOD PRESSURE: 120 MMHG | BODY MASS INDEX: 38.09 KG/M2 | TEMPERATURE: 98.3 F | HEART RATE: 90 BPM

## 2018-02-28 DIAGNOSIS — I10 ESSENTIAL HYPERTENSION: ICD-10-CM

## 2018-02-28 DIAGNOSIS — E11.9 TYPE 2 DIABETES MELLITUS WITHOUT COMPLICATION, WITHOUT LONG-TERM CURRENT USE OF INSULIN (HCC): ICD-10-CM

## 2018-02-28 DIAGNOSIS — L30.0 NUMMULAR ECZEMA: Primary | ICD-10-CM

## 2018-02-28 DIAGNOSIS — R74.8 ELEVATED LIVER ENZYMES: ICD-10-CM

## 2018-02-28 DIAGNOSIS — E78.5 HYPERLIPIDEMIA, UNSPECIFIED HYPERLIPIDEMIA TYPE: ICD-10-CM

## 2018-02-28 PROCEDURE — 99214 OFFICE O/P EST MOD 30 MIN: CPT | Performed by: INTERNAL MEDICINE

## 2018-02-28 RX ORDER — FLUTICASONE PROPIONATE 0.05 %
CREAM (GRAM) TOPICAL DAILY
Qty: 30 G | Refills: 0 | Status: SHIPPED | OUTPATIENT
Start: 2018-02-28 | End: 2019-02-12

## 2018-02-28 NOTE — PROGRESS NOTES
Subjective   Rashid Henderson is a 52 y.o. male who comes in today for   Chief Complaint   Patient presents with   • Diabetes   .    History of Present Illness   Here for f/u on NIDDM on jardiance 10mg qd and janumet 50/1000 2 tabs/day.  Gets some exercise at the gym at work but not every day.  Has lost a few pounds since Oct. 2017.  Checking sugars in the morning and usually are 120's.  HTN on lisinopril.    The following portions of the patient's history were reviewed and updated as appropriate: allergies, current medications, past family history, past medical history, past social history, past surgical history and problem list.    Review of Systems   Constitutional: Negative.    Respiratory:        Had a cough in January that is now gone.     Cardiovascular: Negative.        Vitals:    02/28/18 0758   BP: 120/70   Pulse: 90   Resp: 18   Temp: 98.3 °F (36.8 °C)   SpO2: 96%       Objective   Physical Exam   Constitutional: He is oriented to person, place, and time. He appears well-developed and well-nourished.   HENT:   Head: Normocephalic and atraumatic.   Right Ear: External ear normal.   Left Ear: External ear normal.   Mouth/Throat: Oropharynx is clear and moist.   Eyes: Conjunctivae are normal.   Neck: Neck supple.   Cardiovascular: Normal rate, regular rhythm and normal heart sounds.    No bruits   Pulmonary/Chest: Effort normal and breath sounds normal. No respiratory distress. He has no wheezes. He has no rales.   Abdominal: Soft. Bowel sounds are normal. He exhibits no distension and no mass. There is no tenderness.   Lymphadenopathy:     He has no cervical adenopathy.   Neurological: He is alert and oriented to person, place, and time.   Skin: Skin is warm. Rash (red raised circular patch on left shin) noted.   Psychiatric: He has a normal mood and affect. His behavior is normal. Judgment and thought content normal.   Nursing note and vitals reviewed.      Assessment/Plan   Rashid was seen today for  diabetes.    Diagnoses and all orders for this visit:    Nummular eczema    Hyperlipidemia, unspecified hyperlipidemia type    Essential hypertension    Type 2 diabetes mellitus without complication, without long-term current use of insulin    Elevated liver enzymes    Other orders  -     fluticasone (CUTIVATE) 0.05 % cream; Apply  topically Daily.      For the eczematous patch on left anterior shin, will start cutivate topically and if not improving in 3 weeks, I have given him Dr. Felipe's name and advised him to see her  Will continue janumet and jardiance and increase from 10 to 25mg at next ov if his numbers aren't improving.  I stressed to him that he needs to lose weight and increase exercise in order to improve his health.  He is aware and wishes to consider weight watchers per my recommendation  Continue lisinopril for HTN  LFT's have normalized  Labs reviewed and copy given to patient.                 I have asked for the patient to return to clinic in 6month(s).

## 2018-06-21 DIAGNOSIS — I15.9 SECONDARY HYPERTENSION: ICD-10-CM

## 2018-06-21 DIAGNOSIS — E11.9 TYPE 2 DIABETES MELLITUS WITHOUT COMPLICATION, WITHOUT LONG-TERM CURRENT USE OF INSULIN (HCC): ICD-10-CM

## 2018-06-21 DIAGNOSIS — E78.5 HYPERLIPIDEMIA, UNSPECIFIED HYPERLIPIDEMIA TYPE: Primary | ICD-10-CM

## 2018-06-22 LAB
ALBUMIN SERPL-MCNC: 4.3 G/DL (ref 3.5–5.2)
ALBUMIN/GLOB SERPL: 1.7 G/DL
ALP SERPL-CCNC: 41 U/L (ref 39–117)
ALT SERPL-CCNC: 45 U/L (ref 1–41)
AST SERPL-CCNC: 35 U/L (ref 1–40)
BASOPHILS # BLD AUTO: 0.04 10*3/MM3 (ref 0–0.2)
BASOPHILS NFR BLD AUTO: 0.7 % (ref 0–1.5)
BILIRUB SERPL-MCNC: 0.5 MG/DL (ref 0.1–1.2)
BUN SERPL-MCNC: 23 MG/DL (ref 6–20)
BUN/CREAT SERPL: 19.7 (ref 7–25)
CALCIUM SERPL-MCNC: 9.8 MG/DL (ref 8.6–10.5)
CHLORIDE SERPL-SCNC: 97 MMOL/L (ref 98–107)
CHOLEST SERPL-MCNC: 179 MG/DL (ref 0–200)
CO2 SERPL-SCNC: 21.4 MMOL/L (ref 22–29)
CREAT SERPL-MCNC: 1.17 MG/DL (ref 0.76–1.27)
EOSINOPHIL # BLD AUTO: 0.29 10*3/MM3 (ref 0–0.7)
EOSINOPHIL NFR BLD AUTO: 5.4 % (ref 0.3–6.2)
ERYTHROCYTE [DISTWIDTH] IN BLOOD BY AUTOMATED COUNT: 12.5 % (ref 11.5–14.5)
GFR SERPLBLD CREATININE-BSD FMLA CKD-EPI: 66 ML/MIN/1.73
GFR SERPLBLD CREATININE-BSD FMLA CKD-EPI: 80 ML/MIN/1.73
GLOBULIN SER CALC-MCNC: 2.5 GM/DL
GLUCOSE SERPL-MCNC: 127 MG/DL (ref 65–99)
HBA1C MFR BLD: 6.7 % (ref 4.8–5.6)
HCT VFR BLD AUTO: 45.2 % (ref 40.4–52.2)
HDLC SERPL-MCNC: 69 MG/DL (ref 40–60)
HGB BLD-MCNC: 15.8 G/DL (ref 13.7–17.6)
IMM GRANULOCYTES # BLD: 0.01 10*3/MM3 (ref 0–0.03)
IMM GRANULOCYTES NFR BLD: 0.2 % (ref 0–0.5)
LDLC SERPL CALC-MCNC: 78 MG/DL (ref 0–100)
LDLC/HDLC SERPL: 1.13 {RATIO}
LYMPHOCYTES # BLD AUTO: 1.79 10*3/MM3 (ref 0.9–4.8)
LYMPHOCYTES NFR BLD AUTO: 33 % (ref 19.6–45.3)
MCH RBC QN AUTO: 31 PG (ref 27–32.7)
MCHC RBC AUTO-ENTMCNC: 35 G/DL (ref 32.6–36.4)
MCV RBC AUTO: 88.8 FL (ref 79.8–96.2)
MONOCYTES # BLD AUTO: 0.38 10*3/MM3 (ref 0.2–1.2)
MONOCYTES NFR BLD AUTO: 7 % (ref 5–12)
NEUTROPHILS # BLD AUTO: 2.92 10*3/MM3 (ref 1.9–8.1)
NEUTROPHILS NFR BLD AUTO: 53.9 % (ref 42.7–76)
PLATELET # BLD AUTO: 233 10*3/MM3 (ref 140–500)
POTASSIUM SERPL-SCNC: 4.3 MMOL/L (ref 3.5–5.2)
PROT SERPL-MCNC: 6.8 G/DL (ref 6–8.5)
RBC # BLD AUTO: 5.09 10*6/MM3 (ref 4.6–6)
SODIUM SERPL-SCNC: 135 MMOL/L (ref 136–145)
TRIGL SERPL-MCNC: 159 MG/DL (ref 0–150)
VLDLC SERPL CALC-MCNC: 31.8 MG/DL (ref 5–40)
WBC # BLD AUTO: 5.42 10*3/MM3 (ref 4.5–10.7)

## 2018-06-27 ENCOUNTER — OFFICE VISIT (OUTPATIENT)
Dept: FAMILY MEDICINE CLINIC | Facility: CLINIC | Age: 53
End: 2018-06-27

## 2018-06-27 VITALS
OXYGEN SATURATION: 98 % | BODY MASS INDEX: 36.91 KG/M2 | HEART RATE: 67 BPM | HEIGHT: 70 IN | SYSTOLIC BLOOD PRESSURE: 120 MMHG | TEMPERATURE: 98.2 F | RESPIRATION RATE: 18 BRPM | DIASTOLIC BLOOD PRESSURE: 74 MMHG | WEIGHT: 257.8 LBS

## 2018-06-27 DIAGNOSIS — R74.8 ELEVATED LIVER ENZYMES: ICD-10-CM

## 2018-06-27 DIAGNOSIS — E11.9 TYPE 2 DIABETES MELLITUS WITHOUT COMPLICATION, WITHOUT LONG-TERM CURRENT USE OF INSULIN (HCC): ICD-10-CM

## 2018-06-27 DIAGNOSIS — E78.5 HYPERLIPIDEMIA, UNSPECIFIED HYPERLIPIDEMIA TYPE: Primary | ICD-10-CM

## 2018-06-27 DIAGNOSIS — I10 ESSENTIAL HYPERTENSION: ICD-10-CM

## 2018-06-27 PROCEDURE — 99214 OFFICE O/P EST MOD 30 MIN: CPT | Performed by: INTERNAL MEDICINE

## 2018-06-27 NOTE — PROGRESS NOTES
Subjective   Rashid Henderson is a 52 y.o. male who comes in today for   Chief Complaint   Patient presents with   • Diabetes   .    History of Present Illness   Here for f/u on NIDDM and taking janumet 50/1000mg 2tabs/day and jardiance 10mg qd and he is following low carb diet.  Has lost 15 pounds in 4 months.  HTN on lisinopril 10mg qd and HL on fish oil. FBS  in the morning.  Walking most days of the week as well.  Improved energy and feeling better with weight loss. 4th PIP joint sore and aches.  No swelling or stiffness.    The following portions of the patient's history were reviewed and updated as appropriate: allergies, current medications, past family history, past medical history, past social history, past surgical history and problem list.    Review of Systems   Constitutional: Negative for unexpected weight change (intentional weight loss).   Musculoskeletal: Positive for arthralgias (left 4th PIP is very sore and feels like he jammed it.  aches at times.  h/o OA in family).       Vitals:    06/27/18 0926   BP: 120/74   Pulse: 67   Resp: 18   Temp: 98.2 °F (36.8 °C)   SpO2: 98%       Objective   Physical Exam   Constitutional: He is oriented to person, place, and time. He appears well-developed and well-nourished.   HENT:   Head: Normocephalic and atraumatic.   Right Ear: External ear normal.   Left Ear: External ear normal.   Mouth/Throat: Oropharynx is clear and moist.   Eyes: Conjunctivae are normal.   Neck: Neck supple.   Cardiovascular: Normal rate, regular rhythm and normal heart sounds.    No bruits   Pulmonary/Chest: Effort normal and breath sounds normal. No respiratory distress. He has no wheezes. He has no rales.   Abdominal: Soft. Bowel sounds are normal. He exhibits no distension and no mass. There is no tenderness.     Vascular Status -  His right foot exhibits normal foot vasculature  and no edema. His left foot exhibits normal foot vasculature  and no edema.  Lymphadenopathy:     He has  no cervical adenopathy.   Neurological: He is alert and oriented to person, place, and time.   Skin: Skin is warm. Capillary refill takes less than 2 seconds.   Psychiatric: He has a normal mood and affect. His behavior is normal. Judgment and thought content normal.   Nursing note and vitals reviewed.      Assessment/Plan   Rashid was seen today for diabetes.    Diagnoses and all orders for this visit:    Hyperlipidemia, unspecified hyperlipidemia type    Essential hypertension    Type 2 diabetes mellitus without complication, without long-term current use of insulin    Elevated liver enzymes    labs are improved with a1c with addition of jardiance and a low carb diet.  We discussed good and bad fats and protein sources.  He is also walking  Continue current meds .  As weight drops, hopefully his LFT will continue to improve as will his numbers.  He will monitor his PIP joint.  On exam it is normal and no tenderness or redness.  May have had a little trauma that is healing.                I have asked for the patient to return to clinic in 6month(s).

## 2018-07-17 RX ORDER — EMPAGLIFLOZIN 10 MG/1
TABLET, FILM COATED ORAL
Qty: 30 TABLET | Refills: 5 | Status: SHIPPED | OUTPATIENT
Start: 2018-07-17 | End: 2019-01-16 | Stop reason: SDUPTHER

## 2018-09-15 RX ORDER — LISINOPRIL 10 MG/1
TABLET ORAL
Qty: 90 TABLET | Refills: 1 | Status: SHIPPED | OUTPATIENT
Start: 2018-09-15 | End: 2019-03-13 | Stop reason: SDUPTHER

## 2018-09-21 ENCOUNTER — TELEPHONE (OUTPATIENT)
Dept: FAMILY MEDICINE CLINIC | Facility: CLINIC | Age: 53
End: 2018-09-21

## 2018-09-21 NOTE — TELEPHONE ENCOUNTER
Patient needs a hand written script for test strips with the following information     accucheck compact plus test strips   Qty   Dx code  And how many time he checks his sugar     I will fax to [facility]

## 2018-09-27 DIAGNOSIS — E11.9 TYPE 2 DIABETES MELLITUS WITHOUT COMPLICATION, WITHOUT LONG-TERM CURRENT USE OF INSULIN (HCC): ICD-10-CM

## 2018-09-27 DIAGNOSIS — I15.9 SECONDARY HYPERTENSION: Primary | ICD-10-CM

## 2018-09-27 DIAGNOSIS — E78.5 HYPERLIPIDEMIA, UNSPECIFIED HYPERLIPIDEMIA TYPE: ICD-10-CM

## 2018-10-02 LAB
ALBUMIN SERPL-MCNC: 4.7 G/DL (ref 3.5–5.2)
ALBUMIN/GLOB SERPL: 1.7 G/DL
ALP SERPL-CCNC: 43 U/L (ref 39–117)
ALT SERPL-CCNC: 29 U/L (ref 1–41)
AST SERPL-CCNC: 16 U/L (ref 1–40)
BASOPHILS # BLD AUTO: 0.02 10*3/MM3 (ref 0–0.2)
BASOPHILS NFR BLD AUTO: 0.3 % (ref 0–1.5)
BILIRUB SERPL-MCNC: 0.5 MG/DL (ref 0.1–1.2)
BUN SERPL-MCNC: 24 MG/DL (ref 6–20)
BUN/CREAT SERPL: 22.9 (ref 7–25)
CALCIUM SERPL-MCNC: 9.5 MG/DL (ref 8.6–10.5)
CHLORIDE SERPL-SCNC: 101 MMOL/L (ref 98–107)
CHOLEST SERPL-MCNC: 181 MG/DL (ref 0–200)
CO2 SERPL-SCNC: 24.2 MMOL/L (ref 22–29)
CREAT SERPL-MCNC: 1.05 MG/DL (ref 0.76–1.27)
EOSINOPHIL # BLD AUTO: 0.32 10*3/MM3 (ref 0–0.7)
EOSINOPHIL NFR BLD AUTO: 4.7 % (ref 0.3–6.2)
ERYTHROCYTE [DISTWIDTH] IN BLOOD BY AUTOMATED COUNT: 13 % (ref 11.5–14.5)
GLOBULIN SER CALC-MCNC: 2.8 GM/DL
GLUCOSE SERPL-MCNC: 117 MG/DL (ref 65–99)
HBA1C MFR BLD: 5.9 % (ref 4.8–5.6)
HCT VFR BLD AUTO: 45.3 % (ref 40.4–52.2)
HDLC SERPL-MCNC: 103 MG/DL (ref 40–60)
HGB BLD-MCNC: 15.1 G/DL (ref 13.7–17.6)
IMM GRANULOCYTES # BLD: 0.03 10*3/MM3 (ref 0–0.03)
IMM GRANULOCYTES NFR BLD: 0.4 % (ref 0–0.5)
LDLC SERPL CALC-MCNC: 63 MG/DL (ref 0–100)
LDLC/HDLC SERPL: 0.62 {RATIO}
LYMPHOCYTES # BLD AUTO: 2.32 10*3/MM3 (ref 0.9–4.8)
LYMPHOCYTES NFR BLD AUTO: 34 % (ref 19.6–45.3)
MCH RBC QN AUTO: 30.3 PG (ref 27–32.7)
MCHC RBC AUTO-ENTMCNC: 33.3 G/DL (ref 32.6–36.4)
MCV RBC AUTO: 90.8 FL (ref 79.8–96.2)
MONOCYTES # BLD AUTO: 0.65 10*3/MM3 (ref 0.2–1.2)
MONOCYTES NFR BLD AUTO: 9.5 % (ref 5–12)
NEUTROPHILS # BLD AUTO: 3.51 10*3/MM3 (ref 1.9–8.1)
NEUTROPHILS NFR BLD AUTO: 51.5 % (ref 42.7–76)
PLATELET # BLD AUTO: 211 10*3/MM3 (ref 140–500)
POTASSIUM SERPL-SCNC: 4.8 MMOL/L (ref 3.5–5.2)
PROT SERPL-MCNC: 7.5 G/DL (ref 6–8.5)
RBC # BLD AUTO: 4.99 10*6/MM3 (ref 4.6–6)
SODIUM SERPL-SCNC: 139 MMOL/L (ref 136–145)
TRIGL SERPL-MCNC: 73 MG/DL (ref 0–150)
VLDLC SERPL CALC-MCNC: 14.6 MG/DL (ref 5–40)
WBC # BLD AUTO: 6.82 10*3/MM3 (ref 4.5–10.7)

## 2018-10-09 ENCOUNTER — OFFICE VISIT (OUTPATIENT)
Dept: FAMILY MEDICINE CLINIC | Facility: CLINIC | Age: 53
End: 2018-10-09

## 2018-10-09 VITALS
DIASTOLIC BLOOD PRESSURE: 70 MMHG | HEART RATE: 71 BPM | OXYGEN SATURATION: 96 % | RESPIRATION RATE: 16 BRPM | BODY MASS INDEX: 35.92 KG/M2 | HEIGHT: 70 IN | WEIGHT: 250.9 LBS | SYSTOLIC BLOOD PRESSURE: 124 MMHG

## 2018-10-09 DIAGNOSIS — E11.9 TYPE 2 DIABETES MELLITUS WITHOUT COMPLICATION, WITHOUT LONG-TERM CURRENT USE OF INSULIN (HCC): ICD-10-CM

## 2018-10-09 DIAGNOSIS — Z12.11 COLON CANCER SCREENING: ICD-10-CM

## 2018-10-09 DIAGNOSIS — R74.8 ELEVATED LIVER ENZYMES: ICD-10-CM

## 2018-10-09 DIAGNOSIS — I10 ESSENTIAL HYPERTENSION: Primary | ICD-10-CM

## 2018-10-09 PROCEDURE — 99214 OFFICE O/P EST MOD 30 MIN: CPT | Performed by: INTERNAL MEDICINE

## 2018-10-09 RX ORDER — BLOOD SUGAR DIAGNOSTIC, DRUM
STRIP MISCELLANEOUS
Refills: 3 | COMMUNITY
Start: 2018-09-29 | End: 2022-07-18

## 2018-10-11 ENCOUNTER — TELEPHONE (OUTPATIENT)
Dept: FAMILY MEDICINE CLINIC | Facility: CLINIC | Age: 53
End: 2018-10-11

## 2018-10-11 NOTE — PROGRESS NOTES
Subjective   Rashid Henderson is a 52 y.o. male who comes in today for No chief complaint on file.  .    Diabetes   He has type 2 diabetes mellitus. No MedicAlert identification noted. The initial diagnosis of diabetes was made 8 years ago. Pertinent negatives for hypoglycemia include no confusion, dizziness, headaches, hunger, mood changes, nervousness/anxiousness, pallor, seizures, sleepiness, speech difficulty, sweats or tremors. Associated symptoms include weight loss. Pertinent negatives for diabetes include no blurred vision, no chest pain, no fatigue, no foot paresthesias, no foot ulcerations, no polydipsia, no polyphagia, no polyuria, no visual change and no weakness. Pertinent negatives for hypoglycemia complications include no blackouts, no hospitalization, no nocturnal hypoglycemia, no required assistance and no required glucagon injection. Symptoms are improving. Pertinent negatives for diabetic complications include no CVA, heart disease, impotence, nephropathy, peripheral neuropathy, PVD or retinopathy. Risk factors for coronary artery disease include family history and obesity. Current diabetic treatment includes oral agent (dual therapy). He is compliant with treatment all of the time. His weight is decreasing steadily. He is following a generally healthy diet. Meal planning includes avoidance of concentrated sweets and carbohydrate counting. He has not had a previous visit with a dietitian. He participates in exercise three times a week. He monitors blood glucose at home 1-2 x per day. Blood glucose monitoring compliance is good. There is no change in his home blood glucose trend. His breakfast blood glucose is taken between 6-7 am. His breakfast blood glucose range is generally  mg/dl. His highest blood glucose is 110-130 mg/dl. His overall blood glucose range is 110-130 mg/dl. He does not see a podiatrist.Eye exam is current.      Here for f/u on NIDDM and HTN taking janumet 50/1000 bid and  jardiance 10 mg qd and lisinopril 10 mg qd for HTN.  Has lost weight.  Exercising and feeling well.  Following a modified keto diet.  No concerns today.  Wanting to review labs and see if he can reduce any of his meds.    The following portions of the patient's history were reviewed and updated as appropriate: allergies, current medications, past family history, past medical history, past social history, past surgical history and problem list.    Review of Systems   Constitutional: Positive for weight loss. Negative for fatigue.   Eyes: Negative for blurred vision.   Cardiovascular: Negative for chest pain.   Endocrine: Negative for polydipsia, polyphagia and polyuria.   Genitourinary: Negative for impotence.   Skin: Negative for pallor.   Neurological: Negative for dizziness, tremors, seizures, speech difficulty, weakness and headaches.   Psychiatric/Behavioral: Negative for confusion. The patient is not nervous/anxious.        Vitals:    10/09/18 1047   BP: 124/70   Pulse: 71   Resp: 16   SpO2: 96%       Objective   Physical Exam   Constitutional: He is oriented to person, place, and time. He appears well-developed and well-nourished.   HENT:   Head: Normocephalic and atraumatic.   Right Ear: External ear normal.   Left Ear: External ear normal.   Mouth/Throat: Oropharynx is clear and moist.   Eyes: Conjunctivae are normal.   Neck: Neck supple.   Cardiovascular: Normal rate, regular rhythm and normal heart sounds.    No bruits   Pulmonary/Chest: Effort normal and breath sounds normal. No respiratory distress. He has no wheezes. He has no rales.   Abdominal: Soft. Bowel sounds are normal. He exhibits no distension and no mass. There is no tenderness.   Lymphadenopathy:     He has no cervical adenopathy.   Neurological: He is alert and oriented to person, place, and time.   Skin: Skin is warm.   Psychiatric: He has a normal mood and affect. His behavior is normal. Judgment and thought content normal.   Nursing note  and vitals reviewed.      Assessment/Plan   Diagnoses and all orders for this visit:    Essential hypertension    Type 2 diabetes mellitus without complication, without long-term current use of insulin (CMS/HCC)    Elevated liver enzymes    Colon cancer screening  -     Ambulatory Referral to Gastroenterology    a1c is improving with weight loss.  i've ok'd him to take jardiance 10 mg qod and continue the janumet 50/1000 2/day and continue lisinopril 10 mg qd for HTN.  His cholesterol is excellent on no medication.  All other HM is up to date other than he is due for cscope and I will get that ball rolling with [facility] here at [facility]  Labs reviewed with patient             I have asked for the patient to return to clinic in 6month(s).

## 2018-10-11 NOTE — TELEPHONE ENCOUNTER
----- Message from Nyla Al MD sent at 10/11/2018  3:10 PM EDT -----  Regarding: colleenope  I was working on his note and realized that he is 52 years old and never had cscope that I can see.  I have started the process and ordered GI bypass cscope with [facility].  He will get paperwork and once he completes it and mails back, they will set him up for scope

## 2018-11-10 RX ORDER — VALACYCLOVIR HYDROCHLORIDE 500 MG/1
TABLET, FILM COATED ORAL
Qty: 6 TABLET | Refills: 0 | Status: SHIPPED | OUTPATIENT
Start: 2018-11-10 | End: 2019-03-13 | Stop reason: SDUPTHER

## 2018-12-28 RX ORDER — SITAGLIPTIN AND METFORMIN HYDROCHLORIDE 1000; 50 MG/1; MG/1
TABLET, FILM COATED ORAL
Qty: 60 TABLET | Refills: 2 | Status: SHIPPED | OUTPATIENT
Start: 2018-12-28 | End: 2019-04-06 | Stop reason: SDUPTHER

## 2019-01-15 ENCOUNTER — OFFICE VISIT (OUTPATIENT)
Dept: SLEEP MEDICINE | Facility: HOSPITAL | Age: 54
End: 2019-01-15
Attending: PSYCHIATRY & NEUROLOGY

## 2019-01-15 VITALS
WEIGHT: 289 LBS | HEART RATE: 74 BPM | DIASTOLIC BLOOD PRESSURE: 84 MMHG | SYSTOLIC BLOOD PRESSURE: 141 MMHG | BODY MASS INDEX: 40.46 KG/M2 | HEIGHT: 71 IN

## 2019-01-15 DIAGNOSIS — G47.33 OSA (OBSTRUCTIVE SLEEP APNEA): Primary | ICD-10-CM

## 2019-01-15 PROCEDURE — G0463 HOSPITAL OUTPT CLINIC VISIT: HCPCS

## 2019-01-15 NOTE — PROGRESS NOTES
"Rashid Henderson  1965  [id number]     DATE OF VISIT:1/15/2019   HISTORY:  Patient has severe obstructive sleep apnea syndrome.  Polysomnography in the past has revealed apnea-hypopnea index of 94/sleep hour and minimum SpO2 of 69%. Patient was hypoxic for 55% of the study time. The patient had hypersomnia with [place] Sleepiness Scale score of 11.     The patient was successfully treated with CPAP therapy and the patient is now on auto CPAP therapy with mean pressure of 8.7 cmH20 and AHI down to 1.4. Compliance data indicates excellent compliance with 93.3% usage for more than 4 hours with an average usage of 7 hours and 1 minutes. The patient is compliant and benefiting from it. The patient's [place] Sleepiness Scale score is down to 2.     PHYSICAL EXAMINATION:  Vitals:    01/15/19 1100   BP: 141/84   Pulse: 74   Weight: 131 kg (289 lb)   Height: 180.3 cm (71\")   Body mass index is 40.31 kg/m².   HEENT: Narrow oropharynx. Mallampati class III   NECK: Supple. No bruits.   CARDIAC: Normal.   LUNGS: Clear to auscultation.   EXTREMITIES: No edema.     IMPRESSION: Patient with severe obstructive sleep apnea syndrome successfully treated with auto CPAP therapy and the patient is compliant and benefiting from it.     RECOMMENDATIONS: Continue present auto CPAP. Followup 1 year.     Andrew Ni M.D.  1/15/2019         "

## 2019-01-18 RX ORDER — EMPAGLIFLOZIN 10 MG/1
TABLET, FILM COATED ORAL
Qty: 30 TABLET | Refills: 0 | Status: SHIPPED | OUTPATIENT
Start: 2019-01-18 | End: 2019-02-21 | Stop reason: SDUPTHER

## 2019-02-06 DIAGNOSIS — I10 ESSENTIAL HYPERTENSION: ICD-10-CM

## 2019-02-06 DIAGNOSIS — E11.9 TYPE 2 DIABETES MELLITUS WITHOUT COMPLICATION, WITHOUT LONG-TERM CURRENT USE OF INSULIN (HCC): ICD-10-CM

## 2019-02-06 DIAGNOSIS — E78.5 HYPERLIPIDEMIA, UNSPECIFIED HYPERLIPIDEMIA TYPE: Primary | ICD-10-CM

## 2019-02-07 ENCOUNTER — LAB (OUTPATIENT)
Dept: FAMILY MEDICINE CLINIC | Facility: CLINIC | Age: 54
End: 2019-02-07

## 2019-02-07 DIAGNOSIS — E78.5 HYPERLIPIDEMIA, UNSPECIFIED HYPERLIPIDEMIA TYPE: ICD-10-CM

## 2019-02-07 DIAGNOSIS — E11.9 TYPE 2 DIABETES MELLITUS WITHOUT COMPLICATION, WITHOUT LONG-TERM CURRENT USE OF INSULIN (HCC): ICD-10-CM

## 2019-02-07 DIAGNOSIS — I10 ESSENTIAL HYPERTENSION: ICD-10-CM

## 2019-02-07 LAB
ALBUMIN SERPL-MCNC: 4.6 G/DL (ref 3.5–5.2)
ALBUMIN/GLOB SERPL: 1.6 G/DL
ALP SERPL-CCNC: 42 U/L (ref 39–117)
ALT SERPL-CCNC: 23 U/L (ref 1–41)
AST SERPL-CCNC: 18 U/L (ref 1–40)
BILIRUB SERPL-MCNC: 0.5 MG/DL (ref 0.1–1.2)
BUN SERPL-MCNC: 25 MG/DL (ref 6–20)
BUN/CREAT SERPL: 25.5 (ref 7–25)
CALCIUM SERPL-MCNC: 9.5 MG/DL (ref 8.6–10.5)
CHLORIDE SERPL-SCNC: 99 MMOL/L (ref 98–107)
CHOLEST SERPL-MCNC: 199 MG/DL (ref 0–200)
CO2 SERPL-SCNC: 23.8 MMOL/L (ref 22–29)
CREAT SERPL-MCNC: 0.98 MG/DL (ref 0.76–1.27)
GLOBULIN SER CALC-MCNC: 2.8 GM/DL
GLUCOSE SERPL-MCNC: 113 MG/DL (ref 65–99)
HBA1C MFR BLD: 6.6 % (ref 4.8–5.6)
HDLC SERPL-MCNC: 79 MG/DL (ref 40–60)
LDLC SERPL CALC-MCNC: 87 MG/DL (ref 0–100)
LDLC/HDLC SERPL: 1.1 {RATIO}
POTASSIUM SERPL-SCNC: 4.6 MMOL/L (ref 3.5–5.2)
PROT SERPL-MCNC: 7.4 G/DL (ref 6–8.5)
SODIUM SERPL-SCNC: 135 MMOL/L (ref 136–145)
TRIGL SERPL-MCNC: 165 MG/DL (ref 0–150)
TSH SERPL DL<=0.005 MIU/L-ACNC: 3.88 MIU/ML (ref 0.27–4.2)
VLDLC SERPL CALC-MCNC: 33 MG/DL (ref 5–40)

## 2019-02-12 ENCOUNTER — OFFICE VISIT (OUTPATIENT)
Dept: FAMILY MEDICINE CLINIC | Facility: CLINIC | Age: 54
End: 2019-02-12

## 2019-02-12 VITALS
HEART RATE: 86 BPM | HEIGHT: 71 IN | WEIGHT: 261 LBS | OXYGEN SATURATION: 97 % | SYSTOLIC BLOOD PRESSURE: 124 MMHG | BODY MASS INDEX: 36.54 KG/M2 | DIASTOLIC BLOOD PRESSURE: 76 MMHG | TEMPERATURE: 98.5 F

## 2019-02-12 DIAGNOSIS — E11.9 TYPE 2 DIABETES MELLITUS WITHOUT COMPLICATION, WITHOUT LONG-TERM CURRENT USE OF INSULIN (HCC): ICD-10-CM

## 2019-02-12 DIAGNOSIS — I10 ESSENTIAL HYPERTENSION: ICD-10-CM

## 2019-02-12 DIAGNOSIS — H81.10 BENIGN PAROXYSMAL POSITIONAL VERTIGO, UNSPECIFIED LATERALITY: ICD-10-CM

## 2019-02-12 DIAGNOSIS — R42 LIGHT-HEADED FEELING: ICD-10-CM

## 2019-02-12 DIAGNOSIS — R74.8 ELEVATED LIVER ENZYMES: ICD-10-CM

## 2019-02-12 DIAGNOSIS — E78.5 HYPERLIPIDEMIA, UNSPECIFIED HYPERLIPIDEMIA TYPE: Primary | ICD-10-CM

## 2019-02-12 PROCEDURE — 99214 OFFICE O/P EST MOD 30 MIN: CPT | Performed by: INTERNAL MEDICINE

## 2019-02-12 RX ORDER — MECLIZINE HYDROCHLORIDE 25 MG/1
25 TABLET ORAL 3 TIMES DAILY PRN
Qty: 30 TABLET | Refills: 0 | Status: SHIPPED | OUTPATIENT
Start: 2019-02-12 | End: 2019-08-16

## 2019-02-12 NOTE — PROGRESS NOTES
Subjective   Rashid Henderson is a 53 y.o. male who comes in today for   Chief Complaint   Patient presents with   • Hypertension   • Diabetes   • Dizziness     On and off for a  month    .    Hypertension   Pertinent negatives include no blurred vision, chest pain, headaches or sweats. There is no history of CVA, PVD or retinopathy.   Diabetes   He has type 2 diabetes mellitus. No MedicAlert identification noted. The initial diagnosis of diabetes was made 10 years ago. Hypoglycemia symptoms include dizziness. Pertinent negatives for hypoglycemia include no confusion, headaches, hunger, mood changes, nervousness/anxiousness, pallor, seizures, sleepiness, speech difficulty, sweats or tremors. Pertinent negatives for diabetes include no blurred vision, no chest pain, no fatigue, no foot paresthesias, no foot ulcerations, no polydipsia, no polyphagia, no polyuria, no visual change, no weakness and no weight loss. Pertinent negatives for hypoglycemia complications include no blackouts, no hospitalization, no nocturnal hypoglycemia, no required assistance and no required glucagon injection. Symptoms are stable. Pertinent negatives for diabetic complications include no CVA, heart disease, impotence, nephropathy, peripheral neuropathy, PVD or retinopathy. Risk factors for coronary artery disease include hypertension and obesity. Current diabetic treatment includes diet and oral agent (dual therapy). He is compliant with treatment most of the time. His weight is stable. Meal planning includes carbohydrate counting. He has not had a previous visit with a dietitian. He participates in exercise three times a week. He monitors blood glucose at home 3-4 x per week. He monitors urine at home <1 x per month. Blood glucose monitoring compliance is good. There is no change in his home blood glucose trend. His breakfast blood glucose is taken between 6-7 am. His breakfast blood glucose range is generally 110-130 mg/dl. He does not see a  podiatrist.Eye exam is current.   Dizziness   Pertinent negatives include no chest pain, fatigue, headaches, visual change or weakness.    here for f/u on HL, NIDDM and HTN.  Recently has had a light headed feeling for about a month that is off and on and he can't find a reason for it.  Not related to blood sugars nor blood pressure.  No vertigo. More of a light headed feeling.  Can be there for a day and then goes away.  Feels like a drunk state and that he can't walk a straight line.  No one else notices it in his gait.  Some neck tightness associated with it.  No edward ha's.  Vision has been normal.  It is worsened by moving head or looking up or down.  No n/v.    No hearing changes.      The following portions of the patient's history were reviewed and updated as appropriate: allergies, current medications, past family history, past medical history, past social history, past surgical history and problem list.    Review of Systems   Constitutional: Negative for fatigue and weight loss.   Eyes: Negative for blurred vision.   Cardiovascular: Negative for chest pain.   Endocrine: Negative for polydipsia, polyphagia and polyuria.   Genitourinary: Negative for impotence.   Skin: Negative for pallor.   Neurological: Positive for dizziness. Negative for tremors, seizures, speech difficulty, weakness and headaches.   Psychiatric/Behavioral: Negative for confusion. The patient is not nervous/anxious.        Vitals:    02/12/19 1458   BP: 124/76   Pulse: 86   Temp: 98.5 °F (36.9 °C)   SpO2: 97%       Objective   Physical Exam   Constitutional: He is oriented to person, place, and time. He appears well-developed and well-nourished.   HENT:   Head: Normocephalic and atraumatic.   Right Ear: External ear normal.   Left Ear: External ear normal.   Mouth/Throat: Oropharynx is clear and moist.   Eyes: Conjunctivae are normal.   Neck: Neck supple.   Cardiovascular: Normal rate, regular rhythm and normal heart sounds.   No bruits    Pulmonary/Chest: Effort normal and breath sounds normal. No respiratory distress. He has no wheezes. He has no rales.   Abdominal: Soft. Bowel sounds are normal. He exhibits no distension and no mass. There is no tenderness.   Lymphadenopathy:     He has no cervical adenopathy.   Neurological: He is alert and oriented to person, place, and time.   Skin: Skin is warm.   Psychiatric: He has a normal mood and affect. His behavior is normal. Judgment and thought content normal.   Nursing note and vitals reviewed.        Current Outpatient Medications:   •  fluticasone (FLONASE) 50 MCG/ACT nasal spray, 2 sprays into each nostril daily., Disp: , Rfl:   •  JANUMET  MG per tablet, TAKE 1 TABLET BY MOUTH TWICE A DAY, Disp: 60 tablet, Rfl: 2  •  JARDIANCE 10 MG tablet, TAKE 1 TABLET BY MOUTH EVERY DAY, Disp: 30 tablet, Rfl: 0  •  lisinopril (PRINIVIL,ZESTRIL) 10 MG tablet, TAKE 1 TABLET BY MOUTH EVERY DAY, Disp: 90 tablet, Rfl: 1  •  Omega-3 Fatty Acids (FISH OIL) 1000 MG capsule capsule, Take 3 capsules by mouth daily., Disp: , Rfl:   •  triamcinolone (KENALOG) 0.1 % cream, Apply topically 2 (two) times a day. Apply sparingly and massage in, Disp: , Rfl:   •  valACYclovir (VALTREX) 500 MG tablet, TAKE 1 TABLET BY MOUTH TWICE DAILY FOR 3 DAYS, WITHIN 24 HOURS OF FIRST SIGN OF OUTBREAK, Disp: 6 tablet, Rfl: 0  •  ACCU-CHEK COMPACT PLUS test strip, TEST FAST BLOOD SUGAR D, Disp: , Rfl: 3  •  meclizine (ANTIVERT) 25 MG tablet, Take 1 tablet by mouth 3 (Three) Times a Day As Needed for dizziness., Disp: 30 tablet, Rfl: 0    Assessment/Plan   Rashid was seen today for hypertension, diabetes and dizziness.    Diagnoses and all orders for this visit:    Hyperlipidemia, unspecified hyperlipidemia type    Essential hypertension    Type 2 diabetes mellitus without complication, without long-term current use of insulin (CMS/HCC)    Elevated liver enzymes    Light-headed feeling    Benign paroxysmal positional vertigo, unspecified  laterality  -     Ambulatory Referral to ENT (Otolaryngology)    Other orders  -     meclizine (ANTIVERT) 25 MG tablet; Take 1 tablet by mouth 3 (Three) Times a Day As Needed for dizziness.      To me it sounds like he has positional vertigo.  I have referred him to an ENT and hopefully we can get him in this week for Epley maneuvers.  I have also given him meclizine that he can use on an as-needed basis.  We did discuss his labs and that his A1c has increased.  He is well aware that it is related to poor diet over the last 3-4 months.  He states that he is already working on that to fix the carbohydrate intake.  He usually is good at exercising.  He will continue his lisinopril for hypertension as well as his Jardiance and Janumet for diabetes.             I have asked for the patient to return to clinic in 6month(s).

## 2019-02-22 RX ORDER — EMPAGLIFLOZIN 10 MG/1
TABLET, FILM COATED ORAL
Qty: 30 TABLET | Refills: 5 | Status: SHIPPED | OUTPATIENT
Start: 2019-02-22 | End: 2019-08-06 | Stop reason: SDUPTHER

## 2019-03-15 RX ORDER — LISINOPRIL 10 MG/1
TABLET ORAL
Qty: 90 TABLET | Refills: 0 | Status: SHIPPED | OUTPATIENT
Start: 2019-03-15 | End: 2019-06-07 | Stop reason: SDUPTHER

## 2019-03-15 RX ORDER — VALACYCLOVIR HYDROCHLORIDE 500 MG/1
TABLET, FILM COATED ORAL
Qty: 6 TABLET | Refills: 0 | Status: SHIPPED | OUTPATIENT
Start: 2019-03-15 | End: 2019-03-22 | Stop reason: SDUPTHER

## 2019-03-25 RX ORDER — VALACYCLOVIR HYDROCHLORIDE 500 MG/1
TABLET, FILM COATED ORAL
Qty: 6 TABLET | Refills: 0 | Status: SHIPPED | OUTPATIENT
Start: 2019-03-25 | End: 2019-10-10 | Stop reason: SDUPTHER

## 2019-04-08 RX ORDER — SITAGLIPTIN AND METFORMIN HYDROCHLORIDE 1000; 50 MG/1; MG/1
TABLET, FILM COATED ORAL
Qty: 60 TABLET | Refills: 3 | Status: SHIPPED | OUTPATIENT
Start: 2019-04-08 | End: 2019-08-06 | Stop reason: SDUPTHER

## 2019-06-07 RX ORDER — LISINOPRIL 10 MG/1
TABLET ORAL
Qty: 90 TABLET | Refills: 0 | Status: SHIPPED | OUTPATIENT
Start: 2019-06-07 | End: 2019-09-08 | Stop reason: SDUPTHER

## 2019-07-10 DIAGNOSIS — I10 ESSENTIAL HYPERTENSION: ICD-10-CM

## 2019-07-10 DIAGNOSIS — E11.9 TYPE 2 DIABETES MELLITUS WITHOUT COMPLICATION, WITHOUT LONG-TERM CURRENT USE OF INSULIN (HCC): ICD-10-CM

## 2019-07-10 DIAGNOSIS — E03.9 HYPOTHYROIDISM, UNSPECIFIED TYPE: ICD-10-CM

## 2019-07-10 DIAGNOSIS — E78.5 HYPERLIPIDEMIA, UNSPECIFIED HYPERLIPIDEMIA TYPE: Primary | ICD-10-CM

## 2019-07-11 LAB
ALBUMIN SERPL-MCNC: 4.3 G/DL (ref 3.5–5.2)
ALBUMIN/GLOB SERPL: 1.7 G/DL
ALP SERPL-CCNC: 44 U/L (ref 39–117)
ALT SERPL-CCNC: 28 U/L (ref 1–41)
AST SERPL-CCNC: 21 U/L (ref 1–40)
BASOPHILS # BLD AUTO: 0.05 10*3/MM3 (ref 0–0.2)
BASOPHILS NFR BLD AUTO: 0.7 % (ref 0–1.5)
BILIRUB SERPL-MCNC: 0.5 MG/DL (ref 0.2–1.2)
BUN SERPL-MCNC: 20 MG/DL (ref 6–20)
BUN/CREAT SERPL: 19.2 (ref 7–25)
CALCIUM SERPL-MCNC: 9 MG/DL (ref 8.6–10.5)
CHLORIDE SERPL-SCNC: 98 MMOL/L (ref 98–107)
CHOLEST SERPL-MCNC: 164 MG/DL (ref 0–200)
CO2 SERPL-SCNC: 26.9 MMOL/L (ref 22–29)
CREAT SERPL-MCNC: 1.04 MG/DL (ref 0.76–1.27)
EOSINOPHIL # BLD AUTO: 0.34 10*3/MM3 (ref 0–0.4)
EOSINOPHIL NFR BLD AUTO: 5.1 % (ref 0.3–6.2)
ERYTHROCYTE [DISTWIDTH] IN BLOOD BY AUTOMATED COUNT: 12.5 % (ref 12.3–15.4)
GLOBULIN SER CALC-MCNC: 2.5 GM/DL
GLUCOSE SERPL-MCNC: 142 MG/DL (ref 65–99)
HBA1C MFR BLD: 7.2 % (ref 4.8–5.6)
HCT VFR BLD AUTO: 46.2 % (ref 37.5–51)
HDLC SERPL-MCNC: 89 MG/DL (ref 40–60)
HGB BLD-MCNC: 14.9 G/DL (ref 13–17.7)
IMM GRANULOCYTES # BLD AUTO: 0.03 10*3/MM3 (ref 0–0.05)
IMM GRANULOCYTES NFR BLD AUTO: 0.4 % (ref 0–0.5)
LDLC SERPL CALC-MCNC: 42 MG/DL (ref 0–100)
LDLC/HDLC SERPL: 0.47 {RATIO}
LYMPHOCYTES # BLD AUTO: 1.96 10*3/MM3 (ref 0.7–3.1)
LYMPHOCYTES NFR BLD AUTO: 29.1 % (ref 19.6–45.3)
MCH RBC QN AUTO: 29.9 PG (ref 26.6–33)
MCHC RBC AUTO-ENTMCNC: 32.3 G/DL (ref 31.5–35.7)
MCV RBC AUTO: 92.8 FL (ref 79–97)
MONOCYTES # BLD AUTO: 0.5 10*3/MM3 (ref 0.1–0.9)
MONOCYTES NFR BLD AUTO: 7.4 % (ref 5–12)
NEUTROPHILS # BLD AUTO: 3.85 10*3/MM3 (ref 1.7–7)
NEUTROPHILS NFR BLD AUTO: 57.3 % (ref 42.7–76)
NRBC BLD AUTO-RTO: 0 /100 WBC (ref 0–0.2)
PLATELET # BLD AUTO: 204 10*3/MM3 (ref 140–450)
POTASSIUM SERPL-SCNC: 4.4 MMOL/L (ref 3.5–5.2)
PROT SERPL-MCNC: 6.8 G/DL (ref 6–8.5)
RBC # BLD AUTO: 4.98 10*6/MM3 (ref 4.14–5.8)
SODIUM SERPL-SCNC: 137 MMOL/L (ref 136–145)
TRIGL SERPL-MCNC: 166 MG/DL (ref 0–150)
TSH SERPL DL<=0.005 MIU/L-ACNC: 3.86 MIU/ML (ref 0.27–4.2)
VLDLC SERPL CALC-MCNC: 33.2 MG/DL
WBC # BLD AUTO: 6.73 10*3/MM3 (ref 3.4–10.8)

## 2019-07-17 ENCOUNTER — OFFICE VISIT (OUTPATIENT)
Dept: FAMILY MEDICINE CLINIC | Facility: CLINIC | Age: 54
End: 2019-07-17

## 2019-07-17 VITALS
OXYGEN SATURATION: 99 % | HEIGHT: 70 IN | WEIGHT: 262.6 LBS | SYSTOLIC BLOOD PRESSURE: 118 MMHG | BODY MASS INDEX: 37.59 KG/M2 | RESPIRATION RATE: 14 BRPM | HEART RATE: 96 BPM | DIASTOLIC BLOOD PRESSURE: 72 MMHG | TEMPERATURE: 98.8 F

## 2019-07-17 DIAGNOSIS — I10 ESSENTIAL HYPERTENSION: ICD-10-CM

## 2019-07-17 DIAGNOSIS — E78.5 HYPERLIPIDEMIA, UNSPECIFIED HYPERLIPIDEMIA TYPE: Primary | ICD-10-CM

## 2019-07-17 DIAGNOSIS — E11.9 TYPE 2 DIABETES MELLITUS WITHOUT COMPLICATION, WITHOUT LONG-TERM CURRENT USE OF INSULIN (HCC): ICD-10-CM

## 2019-07-17 PROCEDURE — 99214 OFFICE O/P EST MOD 30 MIN: CPT | Performed by: INTERNAL MEDICINE

## 2019-07-17 NOTE — PATIENT INSTRUCTIONS
Exercising to Lose Weight  Exercising can help you to lose weight. In order to lose weight through exercise, you need to do vigorous-intensity exercise. You can tell that you are exercising with vigorous intensity if you are breathing very hard and fast and cannot hold a conversation while exercising.  Moderate-intensity exercise helps to maintain your current weight. You can tell that you are exercising at a moderate level if you have a higher heart rate and faster breathing, but you are still able to hold a conversation.  How often should I exercise?  Choose an activity that you enjoy and set realistic goals. Your health care provider can help you to make an activity plan that works for you. Exercise regularly as directed by your health care provider. This may include:  · Doing resistance training twice each week, such as:  ? Push-ups.  ? Sit-ups.  ? Lifting weights.  ? Using resistance bands.  · Doing a given intensity of exercise for a given amount of time. Choose from these options:  ? 150 minutes of moderate-intensity exercise every week.  ? 75 minutes of vigorous-intensity exercise every week.  ? A mix of moderate-intensity and vigorous-intensity exercise every week.    Children, pregnant women, people who are out of shape, people who are overweight, and older adults may need to consult a health care provider for individual recommendations. If you have any sort of medical condition, be sure to consult your health care provider before starting a new exercise program.  What are some activities that can help me to lose weight?  · Walking at a rate of at least 4.5 miles an hour.  · Jogging or running at a rate of 5 miles per hour.  · Biking at a rate of at least 10 miles per hour.  · Lap swimming.  · Roller-skating or in-line skating.  · Cross-country skiing.  · Vigorous competitive sports, such as football, basketball, and soccer.  · Jumping rope.  · Aerobic dancing.  How can I be more active in my  day-to-day activities?  · Use the stairs instead of the elevator.  · Take a walk during your lunch break.  · If you drive, park your car farther away from work or school.  · If you take public transportation, get off one stop early and walk the rest of the way.  · Make all of your phone calls while standing up and walking around.  · Get up, stretch, and walk around every 30 minutes throughout the day.  What guidelines should I follow while exercising?  · Do not exercise so much that you hurt yourself, feel dizzy, or get very short of breath.  · Consult your health care provider prior to starting a new exercise program.  · Wear comfortable clothes and shoes with good support.  · Drink plenty of water while you exercise to prevent dehydration or heat stroke. Body water is lost during exercise and must be replaced.  · Work out until you breathe faster and your heart beats faster.  This information is not intended to replace advice given to you by your health care provider. Make sure you discuss any questions you have with your health care provider.  Document Released: 01/20/2012 Document Revised: 05/25/2017 Document Reviewed: 05/21/2015  [employer] Interactive Patient Education © 2019 [employer]      MyPlate from [facility] [employer]  MyPlate is an outline of a general healthy diet based on the 2010 Dietary Guidelines for Americans, from the U[place] Department of Agriculture (USDA). It sets guidelines for how much food you should eat from each food group based on your age, sex, and level of physical activity.  What are tips for following MyPlate?  To follow MyPlate recommendations:  · Eat a wide variety of fruits and vegetables, grains, and protein foods.  · Serve smaller portions and eat less food throughout the day.  · Limit portion sizes to avoid overeating.  · Enjoy your food.  · Get at least 150 minutes of exercise every week. This is about 30 minutes each day, 5 or more days per week.    It can be difficult to have every meal look  like MyPlate. Think about MyPlate as eating guidelines for an entire day, rather than each individual meal.  Fruits and Vegetables  · Make half of your plate fruits and vegetables.  · Eat many different colors of fruits and vegetables each day.  · For a 2,000 calorie daily food plan, eat:  ? 2½ cups of vegetables every day.  ? 2 cups of fruit every day.  · 1 cup is equal to:  ? 1 cup raw or cooked vegetables.  ? 1 cup raw fruit.  ? 1 medium-sized orange, apple, or banana.  ? 1 cup 100% fruit or vegetable juice.  ? 2 cups raw leafy greens, such as lettuce, spinach, or kale.  ? ½ cup dried fruit.  Grains  · One fourth of your plate should be grains.  · Make at least half of the grains you eat each day whole grains.  · For a 2,000 calorie daily food plan, eat 6 oz of grains every day.  · 1 oz is equal to:  ? 1 slice bread.  ? 1 cup cereal.  ? ½ cup cooked rice, cereal, or pasta.  Protein  · One fourth of your plate should be protein.  · Eat a wide variety of protein foods, including meat, poultry, fish, eggs, beans, nuts, and tofu.  · For a 2,000 calorie daily food plan, eat 5½ oz of protein every day.  · 1 oz is equal to:  ? 1 oz meat, poultry, or fish.  ? ¼ cup cooked beans.  ? 1 egg.  ? ½ oz nuts or seeds.  ? 1 Tbsp peanut butter.  Dairy  · Drink fat-free or low-fat (1%) milk.  · Eat or drink dairy as a side to meals.  · For a 2,000 calorie daily food plan, eat or drink 3 cups of dairy every day.  · 1 cup is equal to:  ? 1 cup milk, yogurt, cottage cheese, or soy milk (soy beverage).  ? 2 oz processed cheese.  ? 1½ oz natural cheese.  Fats, oils, salt, and sugars  · Only small amounts of oils are recommended.  · Avoid foods that are high in calories and low in nutritional value (empty calories), like foods high in fat or added sugars.  · Choose foods that are low in salt (sodium). Choose foods that have less than 140 milligrams (mg) of sodium per serving.  · Drink water instead of sugary drinks. Drink enough water  each day to keep your urine pale yellow.  Where to find support  · Work with your health care provider or a nutrition specialist (dietitian) to develop a customized eating plan that is right for you.  · Download an rich (mobile application) to help you track your daily food intake.  Where to find more information  · Go to [employer]My[employer].gov for more information.  · Learn more and log your daily food intake according to MyPlate using [employer]: www.[employer].gov  Summary  · MyPlate is a general guideline for healthy eating from the USDA. It is based on the 2010 Dietary Guidelines for Americans.  · In general, fruits and vegetables should take up ½ of your plate, grains should take up ¼ of your plate, and protein should take up ¼ of your plate.  This information is not intended to replace advice given to you by your health care provider. Make sure you discuss any questions you have with your health care provider.  Document Released: 01/06/2009 Document Revised: 03/19/2018 Document Reviewed: 03/19/2018  [employer] Interactive Patient Education © 2019 [employer]      Calorie Counting for Weight Loss  Calories are units of energy. Your body needs a certain amount of calories from food to keep you going throughout the day. When you eat more calories than your body needs, your body stores the extra calories as fat. When you eat fewer calories than your body needs, your body burns fat to get the energy it needs.  Calorie counting means keeping track of how many calories you eat and drink each day. Calorie counting can be helpful if you need to lose weight. If you make sure to eat fewer calories than your body needs, you should lose weight. Ask your health care provider what a healthy weight is for you.  For calorie counting to work, you will need to eat the right number of calories in a day in order to lose a healthy amount of weight per week. A dietitian can help you determine how many calories you need in  a day and will give you suggestions on how to reach your calorie goal.  · A healthy amount of weight to lose per week is usually 1-2 lb (0.5-0.9 kg). This usually means that your daily calorie intake should be reduced by 500-750 calories.  · Eating 1,200 - 1,500 calories per day can help most women lose weight.  · Eating 1,500 - 1,800 calories per day can help most men lose weight.    What is my plan?  My goal is to have __________ calories per day.  If I have this many calories per day, I should lose around __________ pounds per week.  What do I need to know about calorie counting?  In order to meet your daily calorie goal, you will need to:  · Find out how many calories are in each food you would like to eat. Try to do this before you eat.  · Decide how much of the food you plan to eat.  · Write down what you ate and how many calories it had. Doing this is called keeping a food log.    To successfully lose weight, it is important to balance calorie counting with a healthy lifestyle that includes regular activity. Aim for 150 minutes of moderate exercise (such as walking) or 75 minutes of vigorous exercise (such as running) each week.  Where do I find calorie information?    The number of calories in a food can be found on a Nutrition Facts label. If a food does not have a Nutrition Facts label, try to look up the calories online or ask your dietitian for help.  Remember that calories are listed per serving. If you choose to have more than one serving of a food, you will have to multiply the calories per serving by the amount of servings you plan to eat. For example, the label on a package of bread might say that a serving size is 1 slice and that there are 90 calories in a serving. If you eat 1 slice, you will have eaten 90 calories. If you eat 2 slices, you will have eaten 180 calories.  How do I keep a food log?  Immediately after each meal, record the following information in your food log:  · What you ate.  "Don't forget to include toppings, sauces, and other extras on the food.  · How much you ate. This can be measured in cups, ounces, or number of items.  · How many calories each food and drink had.  · The total number of calories in the meal.    Keep your food log near you, such as in a small notebook in your pocket, or use a mobile rich or website. Some programs will calculate calories for you and show you how many calories you have left for the day to meet your goal.  What are some calorie counting tips?  · Use your calories on foods and drinks that will fill you up and not leave you hungry:  ? Some examples of foods that fill you up are nuts and nut butters, vegetables, lean proteins, and high-fiber foods like whole grains. High-fiber foods are foods with more than 5 g fiber per serving.  ? Drinks such as sodas, specialty coffee drinks, alcohol, and juices have a lot of calories, yet do not fill you up.  · Eat nutritious foods and avoid empty calories. Empty calories are calories you get from foods or beverages that do not have many vitamins or protein, such as candy, sweets, and soda. It is better to have a nutritious high-calorie food (such as an avocado) than a food with few nutrients (such as a bag of chips).  · Know how many calories are in the foods you eat most often. This will help you calculate calorie counts faster.  · Pay attention to calories in drinks. Low-calorie drinks include water and unsweetened drinks.  · Pay attention to nutrition labels for \"low fat\" or \"fat free\" foods. These foods sometimes have the same amount of calories or more calories than the full fat versions. They also often have added sugar, starch, or salt, to make up for flavor that was removed with the fat.  · Find a way of tracking calories that works for you. Get creative. Try different apps or programs if writing down calories does not work for you.  What are some portion control tips?  · Know how many calories are in a " serving. This will help you know how many servings of a certain food you can have.  · Use a measuring cup to measure serving sizes. You could also try weighing out portions on a kitchen scale. With time, you will be able to estimate serving sizes for some foods.  · Take some time to put servings of different foods on your favorite plates, bowls, and cups so you know what a serving looks like.  · Try not to eat straight from a bag or box. Doing this can lead to overeating. Put the amount you would like to eat in a cup or on a plate to make sure you are eating the right portion.  · Use smaller plates, glasses, and bowls to prevent overeating.  · Try not to multitask (for example, watch TV or use your computer) while eating. If it is time to eat, sit down at a table and enjoy your food. This will help you to know when you are full. It will also help you to be aware of what you are eating and how much you are eating.  What are tips for following this plan?  Reading food labels  · Check the calorie count compared to the serving size. The serving size may be smaller than what you are used to eating.  · Check the source of the calories. Make sure the food you are eating is high in vitamins and protein and low in saturated and trans fats.  Shopping  · Read nutrition labels while you shop. This will help you make healthy decisions before you decide to purchase your food.  · Make a grocery list and stick to it.  Cooking  · Try to cook your favorite foods in a healthier way. For example, try baking instead of frying.  · Use low-fat dairy products.  Meal planning  · Use more fruits and vegetables. Half of your plate should be fruits and vegetables.  · Include lean proteins like poultry and fish.  How do I count calories when eating out?  · Ask for smaller portion sizes.  · Consider sharing an entree and sides instead of getting your own entree.  · If you get your own entree, eat only half. Ask for a box at the beginning of your  meal and put the rest of your entree in it so you are not tempted to eat it.  · If calories are listed on the menu, choose the lower calorie options.  · Choose dishes that include vegetables, fruits, whole grains, low-fat dairy products, and lean protein.  · Choose items that are boiled, broiled, grilled, or steamed. Stay away from items that are buttered, battered, fried, or served with cream sauce. Items labeled “crispy” are usually fried, unless stated otherwise.  · Choose water, low-fat milk, unsweetened iced tea, or other drinks without added sugar. If you want an alcoholic beverage, choose a lower calorie option such as a glass of wine or light beer.  · Ask for dressings, sauces, and syrups on the side. These are usually high in calories, so you should limit the amount you eat.  · If you want a salad, choose a garden salad and ask for grilled meats. Avoid extra toppings like handley, cheese, or fried items. Ask for the dressing on the side, or ask for olive oil and vinegar or lemon to use as dressing.  · Estimate how many servings of a food you are given. For example, a serving of cooked rice is ½ cup or about the size of half a baseball. Knowing serving sizes will help you be aware of how much food you are eating at restaurants. The list below tells you how big or small some common portion sizes are based on everyday objects:  ? 1 oz--4 stacked dice.  ? 3 oz--1 deck of cards.  ? 1 tsp--1 die.  ? 1 Tbsp--½ a ping-pong ball.  ? 2 Tbsp--1 ping-pong ball.  ? ½ cup--½ baseball.  ? 1 cup--1 baseball.  Summary  · Calorie counting means keeping track of how many calories you eat and drink each day. If you eat fewer calories than your body needs, you should lose weight.  · A healthy amount of weight to lose per week is usually 1-2 lb (0.5-0.9 kg). This usually means reducing your daily calorie intake by 500-750 calories.  · The number of calories in a food can be found on a Nutrition Facts label. If a food does not have  a Nutrition Facts label, try to look up the calories online or ask your dietitian for help.  · Use your calories on foods and drinks that will fill you up, and not on foods and drinks that will leave you hungry.  · Use smaller plates, glasses, and bowls to prevent overeating.  This information is not intended to replace advice given to you by your health care provider. Make sure you discuss any questions you have with your health care provider.  Document Released: 12/18/2006 Document Revised: 11/17/2017 Document Reviewed: 11/17/2017  [employer] Interactive Patient Education © 2019 [employer]    Serving Sizes  A serving size is a measured amount of food or drink, such as one slice of bread, that has an associated nutrient content. Knowing the serving size of a food or drink can help you determine how much of that food you should consume.  What is the size of one serving?  The size of one healthy serving depends on the food or drink. To determine a serving size, read the food label. If the food or drink does not have a food label, try to find serving size information online. Or, use the following to estimate the size of one adult serving:  Grain  1 slice bread. ½ bagel. ½ cup pasta.  Vegetable  ½ cup cooked or canned vegetables. 1 cup raw, leafy greens.  Fruit  ½ cup canned fruit. 1 medium fruit. ¼ cup dried fruit.  Meat and Other Protein Sources  1 oz meat, poultry, or fish. ¼ cup cooked beans. 1 egg. ¼ cup nuts or seeds. 1 Tbsp nut butter. ¼ cup tofu or tempeh. 2 Tbsp hummus.  Dairy  An individual container of yogurt (6-8 oz). 1 piece of cheese the size of your thumb (1 oz). 1 cup (8 oz) milk or milk alternative.  Fat  A piece the size of one dice. 1 tsp soft margarine. 1 Tbsp mayonnaise. 1 tsp vegetable oil. 1 Tbsp regular salad dressing. 2 Tbsp low-fat salad dressing.  How many servings should I eat from each food group each day?  The following are the suggested number of servings to try and have every day from  each food group. You can also look at your eating throughout the week and aim for meeting these requirements on most days for overall healthy eating.  Grain  6-8 servings. Try to have half of your grains from whole grains, such as whole wheat bread, corn tortillas, oatmeal, brown rice, whole wheat pasta, and bulgur.  Vegetable  At least 2½-3 servings.  Fruit  2 servings.  Meat and Other Protein Foods  5-6 servings. Aim to have lean proteins, such as chicken, turkey, fish, beans, or tofu.  Dairy  3 servings. Choose low-fat or nonfat if you are trying to control your weight.  Fat  2-3 servings.  Is a serving the same thing as a portion?  No. A portion is the actual amount you eat, which may be more than one serving. Knowing the specific serving size of a food and the nutritional information that goes with it can help you make a healthy decision on what size portion to eat.  What are some tips to help me learn healthy serving sizes?  · Check food labels for serving sizes. Many foods that come as a single portion actually contain multiple servings.  · Determine the serving size of foods you commonly eat and figure out how large a portion you usually eat.  · Measure the number of servings that can be held by the bowls, glasses, cups, and plates you typically use. For example, pour your breakfast cereal into your regular bowl and then pour it into a measuring cup.  · For 1-2 days, measure the serving sizes of all the foods you eat.  · Practice estimating serving sizes and determining how big your portions should be.  This information is not intended to replace advice given to you by your health care provider. Make sure you discuss any questions you have with your health care provider.  Document Released: 09/15/2004 Document Revised: 08/12/2017 Document Reviewed: 03/17/2015  [employer] Interactive Patient Education © 2018 [employer]

## 2019-08-06 RX ORDER — EMPAGLIFLOZIN 10 MG/1
TABLET, FILM COATED ORAL
Qty: 90 TABLET | Refills: 1 | Status: SHIPPED | OUTPATIENT
Start: 2019-08-06 | End: 2020-03-03

## 2019-08-06 RX ORDER — SITAGLIPTIN AND METFORMIN HYDROCHLORIDE 1000; 50 MG/1; MG/1
TABLET, FILM COATED ORAL
Qty: 180 TABLET | Refills: 1 | Status: SHIPPED | OUTPATIENT
Start: 2019-08-06 | End: 2020-02-05

## 2019-09-09 RX ORDER — LISINOPRIL 10 MG/1
TABLET ORAL
Qty: 90 TABLET | Refills: 0 | Status: SHIPPED | OUTPATIENT
Start: 2019-09-09 | End: 2019-11-19 | Stop reason: SDUPTHER

## 2019-10-10 RX ORDER — VALACYCLOVIR HYDROCHLORIDE 500 MG/1
TABLET, FILM COATED ORAL
Qty: 6 TABLET | Refills: 0 | Status: SHIPPED | OUTPATIENT
Start: 2019-10-10 | End: 2019-10-24 | Stop reason: SDUPTHER

## 2019-10-24 RX ORDER — VALACYCLOVIR HYDROCHLORIDE 500 MG/1
TABLET, FILM COATED ORAL
Qty: 6 TABLET | Refills: 0 | Status: SHIPPED | OUTPATIENT
Start: 2019-10-24 | End: 2020-03-03

## 2019-11-04 VITALS
DIASTOLIC BLOOD PRESSURE: 74 MMHG | HEART RATE: 81 BPM | SYSTOLIC BLOOD PRESSURE: 130 MMHG | OXYGEN SATURATION: 97 % | HEART RATE: 79 BPM | SYSTOLIC BLOOD PRESSURE: 136 MMHG | DIASTOLIC BLOOD PRESSURE: 73 MMHG | OXYGEN SATURATION: 90 % | SYSTOLIC BLOOD PRESSURE: 122 MMHG | HEART RATE: 67 BPM | RESPIRATION RATE: 20 BRPM | OXYGEN SATURATION: 98 % | RESPIRATION RATE: 18 BRPM | OXYGEN SATURATION: 96 % | RESPIRATION RATE: 10 BRPM | OXYGEN SATURATION: 94 % | DIASTOLIC BLOOD PRESSURE: 85 MMHG | WEIGHT: 255 LBS | RESPIRATION RATE: 15 BRPM | SYSTOLIC BLOOD PRESSURE: 127 MMHG | SYSTOLIC BLOOD PRESSURE: 125 MMHG | TEMPERATURE: 97.8 F | SYSTOLIC BLOOD PRESSURE: 104 MMHG | SYSTOLIC BLOOD PRESSURE: 112 MMHG | HEART RATE: 91 BPM | DIASTOLIC BLOOD PRESSURE: 89 MMHG | HEIGHT: 70 IN | DIASTOLIC BLOOD PRESSURE: 70 MMHG | RESPIRATION RATE: 9 BRPM | SYSTOLIC BLOOD PRESSURE: 117 MMHG | TEMPERATURE: 97.7 F | RESPIRATION RATE: 16 BRPM | DIASTOLIC BLOOD PRESSURE: 91 MMHG | RESPIRATION RATE: 17 BRPM | SYSTOLIC BLOOD PRESSURE: 123 MMHG | HEART RATE: 74 BPM | RESPIRATION RATE: 22 BRPM | DIASTOLIC BLOOD PRESSURE: 82 MMHG | SYSTOLIC BLOOD PRESSURE: 120 MMHG | HEART RATE: 86 BPM | DIASTOLIC BLOOD PRESSURE: 69 MMHG | HEART RATE: 78 BPM | HEART RATE: 71 BPM | HEART RATE: 75 BPM | DIASTOLIC BLOOD PRESSURE: 79 MMHG | HEART RATE: 80 BPM | SYSTOLIC BLOOD PRESSURE: 143 MMHG

## 2019-11-04 PROBLEM — Z12.11 SCREENING FOR COLONIC NEOPLASIA: Status: ACTIVE | Noted: 2019-11-05

## 2019-11-05 ENCOUNTER — AMBULATORY SURGICAL CENTER (AMBULATORY)
Dept: URBAN - METROPOLITAN AREA SURGERY 17 | Facility: SURGERY | Age: 54
End: 2019-11-05
Payer: COMMERCIAL

## 2019-11-05 ENCOUNTER — OFFICE (AMBULATORY)
Dept: URBAN - METROPOLITAN AREA PATHOLOGY 4 | Facility: PATHOLOGY | Age: 54
End: 2019-11-05
Payer: COMMERCIAL

## 2019-11-05 DIAGNOSIS — D12.4 BENIGN NEOPLASM OF DESCENDING COLON: ICD-10-CM

## 2019-11-05 DIAGNOSIS — K57.30 DIVERTICULOSIS OF LARGE INTESTINE WITHOUT PERFORATION OR ABS: ICD-10-CM

## 2019-11-05 DIAGNOSIS — D12.5 BENIGN NEOPLASM OF SIGMOID COLON: ICD-10-CM

## 2019-11-05 DIAGNOSIS — D12.2 BENIGN NEOPLASM OF ASCENDING COLON: ICD-10-CM

## 2019-11-05 DIAGNOSIS — Z12.11 ENCOUNTER FOR SCREENING FOR MALIGNANT NEOPLASM OF COLON: ICD-10-CM

## 2019-11-05 DIAGNOSIS — D12.3 BENIGN NEOPLASM OF TRANSVERSE COLON: ICD-10-CM

## 2019-11-05 LAB
GI HISTOLOGY: A. UNSPECIFIED: (no result)
GI HISTOLOGY: B. UNSPECIFIED: (no result)
GI HISTOLOGY: C. UNSPECIFIED: (no result)
GI HISTOLOGY: D. UNSPECIFIED: (no result)
GI HISTOLOGY: PDF REPORT: (no result)

## 2019-11-05 PROCEDURE — 88305 TISSUE EXAM BY PATHOLOGIST: CPT | Mod: 33 | Performed by: INTERNAL MEDICINE

## 2019-11-05 PROCEDURE — 45385 COLONOSCOPY W/LESION REMOVAL: CPT | Mod: 33 | Performed by: INTERNAL MEDICINE

## 2019-11-05 NOTE — SERVICEHPINOTES
Pleasant 54-year-old gentleman without GI complaints. Family history is negative, he is asymptomatic. He presents for a screening colonoscopy.

## 2019-11-08 DIAGNOSIS — E03.9 HYPOTHYROIDISM, UNSPECIFIED TYPE: ICD-10-CM

## 2019-11-08 DIAGNOSIS — E11.9 TYPE 2 DIABETES MELLITUS WITHOUT COMPLICATION, WITHOUT LONG-TERM CURRENT USE OF INSULIN (HCC): ICD-10-CM

## 2019-11-08 DIAGNOSIS — I10 ESSENTIAL HYPERTENSION: ICD-10-CM

## 2019-11-08 DIAGNOSIS — E78.5 HYPERLIPIDEMIA, UNSPECIFIED HYPERLIPIDEMIA TYPE: Primary | ICD-10-CM

## 2019-11-13 LAB
ALBUMIN SERPL-MCNC: 4.7 G/DL (ref 3.5–5.2)
ALBUMIN/GLOB SERPL: 1.4 G/DL
ALP SERPL-CCNC: 51 U/L (ref 39–117)
ALT SERPL-CCNC: 25 U/L (ref 1–41)
AST SERPL-CCNC: 19 U/L (ref 1–40)
BASOPHILS # BLD AUTO: 0.05 10*3/MM3 (ref 0–0.2)
BASOPHILS NFR BLD AUTO: 0.6 % (ref 0–1.5)
BILIRUB SERPL-MCNC: 0.4 MG/DL (ref 0.2–1.2)
BUN SERPL-MCNC: 22 MG/DL (ref 6–20)
BUN/CREAT SERPL: 24.4 (ref 7–25)
CALCIUM SERPL-MCNC: 10.3 MG/DL (ref 8.6–10.5)
CHLORIDE SERPL-SCNC: 98 MMOL/L (ref 98–107)
CHOLEST SERPL-MCNC: 197 MG/DL (ref 0–200)
CO2 SERPL-SCNC: 22.2 MMOL/L (ref 22–29)
CREAT SERPL-MCNC: 0.9 MG/DL (ref 0.76–1.27)
EOSINOPHIL # BLD AUTO: 0.37 10*3/MM3 (ref 0–0.4)
EOSINOPHIL NFR BLD AUTO: 4.5 % (ref 0.3–6.2)
ERYTHROCYTE [DISTWIDTH] IN BLOOD BY AUTOMATED COUNT: 13 % (ref 12.3–15.4)
GLOBULIN SER CALC-MCNC: 3.3 GM/DL
GLUCOSE SERPL-MCNC: 138 MG/DL (ref 65–99)
HBA1C MFR BLD: 7.6 % (ref 4.8–5.6)
HCT VFR BLD AUTO: 48.2 % (ref 37.5–51)
HDLC SERPL-MCNC: 76 MG/DL (ref 40–60)
HGB BLD-MCNC: 16.5 G/DL (ref 13–17.7)
IMM GRANULOCYTES # BLD AUTO: 0.04 10*3/MM3 (ref 0–0.05)
IMM GRANULOCYTES NFR BLD AUTO: 0.5 % (ref 0–0.5)
LDLC SERPL CALC-MCNC: 77 MG/DL (ref 0–100)
LDLC/HDLC SERPL: 1.02 {RATIO}
LYMPHOCYTES # BLD AUTO: 2.9 10*3/MM3 (ref 0.7–3.1)
LYMPHOCYTES NFR BLD AUTO: 35.3 % (ref 19.6–45.3)
MCH RBC QN AUTO: 30.6 PG (ref 26.6–33)
MCHC RBC AUTO-ENTMCNC: 34.2 G/DL (ref 31.5–35.7)
MCV RBC AUTO: 89.4 FL (ref 79–97)
MONOCYTES # BLD AUTO: 0.6 10*3/MM3 (ref 0.1–0.9)
MONOCYTES NFR BLD AUTO: 7.3 % (ref 5–12)
NEUTROPHILS # BLD AUTO: 4.25 10*3/MM3 (ref 1.7–7)
NEUTROPHILS NFR BLD AUTO: 51.8 % (ref 42.7–76)
NRBC BLD AUTO-RTO: 0.1 /100 WBC (ref 0–0.2)
PLATELET # BLD AUTO: 261 10*3/MM3 (ref 140–450)
POTASSIUM SERPL-SCNC: 5.1 MMOL/L (ref 3.5–5.2)
PROT SERPL-MCNC: 8 G/DL (ref 6–8.5)
RBC # BLD AUTO: 5.39 10*6/MM3 (ref 4.14–5.8)
SODIUM SERPL-SCNC: 135 MMOL/L (ref 136–145)
TRIGL SERPL-MCNC: 218 MG/DL (ref 0–150)
TSH SERPL DL<=0.005 MIU/L-ACNC: 3.23 UIU/ML (ref 0.27–4.2)
VLDLC SERPL CALC-MCNC: 43.6 MG/DL
WBC # BLD AUTO: 8.21 10*3/MM3 (ref 3.4–10.8)

## 2019-11-19 ENCOUNTER — OFFICE VISIT (OUTPATIENT)
Dept: FAMILY MEDICINE CLINIC | Facility: CLINIC | Age: 54
End: 2019-11-19

## 2019-11-19 VITALS
WEIGHT: 265 LBS | BODY MASS INDEX: 37.94 KG/M2 | HEART RATE: 87 BPM | HEIGHT: 70 IN | OXYGEN SATURATION: 95 % | SYSTOLIC BLOOD PRESSURE: 110 MMHG | DIASTOLIC BLOOD PRESSURE: 68 MMHG

## 2019-11-19 DIAGNOSIS — E78.5 HYPERLIPIDEMIA, UNSPECIFIED HYPERLIPIDEMIA TYPE: Primary | ICD-10-CM

## 2019-11-19 DIAGNOSIS — E11.9 TYPE 2 DIABETES MELLITUS WITHOUT COMPLICATION, WITHOUT LONG-TERM CURRENT USE OF INSULIN (HCC): ICD-10-CM

## 2019-11-19 DIAGNOSIS — I10 ESSENTIAL HYPERTENSION: ICD-10-CM

## 2019-11-19 PROCEDURE — 99214 OFFICE O/P EST MOD 30 MIN: CPT | Performed by: INTERNAL MEDICINE

## 2019-11-19 RX ORDER — LISINOPRIL 10 MG/1
10 TABLET ORAL DAILY
Qty: 90 TABLET | Refills: 1 | Status: SHIPPED | OUTPATIENT
Start: 2019-11-19 | End: 2020-06-01

## 2019-11-19 NOTE — PROGRESS NOTES
"Subjective   Rashid Henderson is a 54 y.o. male who comes in today for   Chief Complaint   Patient presents with   • Diabetes     Pt had Labs   .    History of Present Illness   Here for f/u on NIDDM janumet 50/1000 2 /day, jardiance 10 mg qd.  On lisinopril for HTN.  Takes 2000 mg fish oil /day for HTG.  Labs show an increase in a1c, TG and and drop in HDL.  Diet has not been good--eating what he wants without restriction.  Also not exercising.  No weight loss.  He states that he started keto diet about 3 weeks ago.  Wife is leading that.  Maybe that is why his TG went up.  Had URI recently and was given an inhaler and zpac .  Cough has resolved.    Patient needs a letter stating that he has had fasting lab work for an insurance deduction through his employer.    The following portions of the patient's history were reviewed and updated as appropriate: allergies, current medications, past family history, past medical history, past social history, past surgical history and problem list.    Review of Systems   Constitutional: Positive for unexpected weight change.   Respiratory: Negative.    Cardiovascular: Negative.        /68   Pulse 87   Ht 177.8 cm (70\")   Wt 120 kg (265 lb)   SpO2 95%   BMI 38.02 kg/m²     STEADI Fall Risk Assessment has not been completed.    PHQ-2/PHQ-9 Depression Screening 10/9/2018   Little interest or pleasure in doing things 0   Feeling down, depressed, or hopeless 0   Total Score 0       Objective   Physical Exam   Constitutional: He is oriented to person, place, and time. He appears well-developed and well-nourished.   HENT:   Head: Normocephalic and atraumatic.   Right Ear: External ear normal.   Left Ear: External ear normal.   Mouth/Throat: Oropharynx is clear and moist.   Eyes: Conjunctivae are normal.   Neck: Neck supple.   Cardiovascular: Normal rate, regular rhythm and normal heart sounds.   No bruits   Pulmonary/Chest: Effort normal and breath sounds normal. No respiratory " distress. He has no wheezes. He has no rales.   Abdominal: Soft. Bowel sounds are normal. He exhibits no distension and no mass. There is no tenderness.   Lymphadenopathy:     He has no cervical adenopathy.   Neurological: He is alert and oriented to person, place, and time.   Skin: Skin is warm.   Psychiatric: He has a normal mood and affect. His behavior is normal. Judgment and thought content normal.   Nursing note and vitals reviewed.        Current Outpatient Medications:   •  ACCU-CHEK COMPACT PLUS test strip, TEST FAST BLOOD SUGAR D, Disp: , Rfl: 3  •  albuterol sulfate  (90 Base) MCG/ACT inhaler, Inhale 2 puffs 3 (Three) Times a Day., Disp: 1 inhaler, Rfl: 0  •  fluticasone (FLONASE) 50 MCG/ACT nasal spray, 2 sprays into the nostril(s) as directed by provider Daily., Disp: 1 bottle, Rfl: 0  •  JANUMET  MG per tablet, TAKE 1 TABLET BY MOUTH TWICE A DAY, Disp: 180 tablet, Rfl: 1  •  JARDIANCE 10 MG tablet, TAKE 1 TABLET BY MOUTH EVERY DAY, Disp: 90 tablet, Rfl: 1  •  lisinopril (PRINIVIL,ZESTRIL) 10 MG tablet, Take 1 tablet by mouth Daily., Disp: 90 tablet, Rfl: 1  •  Omega-3 Fatty Acids (FISH OIL) 1000 MG capsule capsule, Take 3 capsules by mouth daily., Disp: , Rfl:   •  valACYclovir (VALTREX) 500 MG tablet, TAKE 1 TABLET BY MOUTH TWICE DAILY FOR 3 DAYS, WITHIN 24 HOURS OF FIRST SIGN OF OUTBREAK, Disp: 6 tablet, Rfl: 0    Assessment/Plan   Rashid was seen today for diabetes.    Diagnoses and all orders for this visit:    Hyperlipidemia, unspecified hyperlipidemia type    Essential hypertension    Type 2 diabetes mellitus without complication, without long-term current use of insulin (CMS/[facility])    Other orders  -     lisinopril (PRINIVIL,ZESTRIL) 10 MG tablet; Take 1 tablet by mouth Daily.      I have refilled his lisinopril which is working well for him to control blood pressure.  I did caution him that occasionally lisinopril can cause a dry cough which currently he does not have.  We spent a  long time talking about diabetes and the progression of the disease if not properly addressed with lifestyle changes and weight loss.  He is working hard on the keto diet to lose weight and lower his carb intake.  I did advise him that this is a lifestyle long-term issue and that I would love to see his A1c less than 7% in 3 to 4 months at his follow-up visit.  For now we will can continue the Jardiance and the Janumet.  We also talked about good fat and bad fat and that if he is eating keto with a high fat diet fats and saturated fats his triglycerides may look higher.  His LDL is okay.  I do want him to start exercising which will accelerate his weight loss and hopefully improve his numbers.  When to see him back in 3 to 4 months with fasting labs.  Letter was provided for his insurance company today           I have asked for the patient to return to clinic in 6month(s).

## 2019-11-19 NOTE — PATIENT INSTRUCTIONS
Exercising to Lose Weight  Exercise is structured, repetitive physical activity to improve fitness and health. Getting regular exercise is important for everyone. It is especially important if you are overweight. Being overweight increases your risk of heart disease, stroke, diabetes, high blood pressure, and several types of cancer. Reducing your calorie intake and exercising can help you lose weight.  Exercise is usually categorized as moderate or vigorous intensity. To lose weight, most people need to do a certain amount of moderate-intensity or vigorous-intensity exercise each week.  Moderate-intensity exercise    Moderate-intensity exercise is any activity that gets you moving enough to burn at least three times more energy (calories) than if you were sitting.  Examples of moderate exercise include:  · Walking a mile in 15 minutes.  · Doing light yard work.  · Biking at an easy pace.  Most people should get at least 150 minutes (2 hours and 30 minutes) a week of moderate-intensity exercise to maintain their body weight.  Vigorous-intensity exercise  Vigorous-intensity exercise is any activity that gets you moving enough to burn at least six times more calories than if you were sitting. When you exercise at this intensity, you should be working hard enough that you are not able to carry on a conversation.  Examples of vigorous exercise include:  · Running.  · Playing a team sport, such as football, basketball, and soccer.  · Jumping rope.  Most people should get at least 75 minutes (1 hour and 15 minutes) a week of vigorous-intensity exercise to maintain their body weight.  How can exercise affect me?  When you exercise enough to burn more calories than you eat, you lose weight. Exercise also reduces body fat and builds muscle. The more muscle you have, the more calories you burn. Exercise also:  · Improves mood.  · Reduces stress and tension.  · Improves your overall fitness, flexibility, and  endurance.  · Increases bone strength.  The amount of exercise you need to lose weight depends on:  · Your age.  · The type of exercise.  · Any health conditions you have.  · Your overall physical ability.  Talk to your health care provider about how much exercise you need and what types of activities are safe for you.  What actions can I take to lose weight?  Nutrition    · Make changes to your diet as told by your health care provider or diet and nutrition specialist (dietitian). This may include:  ? Eating fewer calories.  ? Eating more protein.  ? Eating less unhealthy fats.  ? Eating a diet that includes fresh fruits and vegetables, whole grains, low-fat dairy products, and lean protein.  ? Avoiding foods with added fat, salt, and sugar.  · Drink plenty of water while you exercise to prevent dehydration or heat stroke.  Activity  · Choose an activity that you enjoy and set realistic goals. Your health care provider can help you make an exercise plan that works for you.  · Exercise at a moderate or vigorous intensity most days of the week.  ? The intensity of exercise may vary from person to person. You can tell how intense a workout is for you by paying attention to your breathing and heartbeat. Most people will notice their breathing and heartbeat get faster with more intense exercise.  · Do resistance training twice each week, such as:  ? Push-ups.  ? Sit-ups.  ? Lifting weights.  ? Using resistance bands.  · Getting short amounts of exercise can be just as helpful as long structured periods of exercise. If you have trouble finding time to exercise, try to include exercise in your daily routine.  ? Get up, stretch, and walk around every 30 minutes throughout the day.  ? Go for a walk during your lunch break.  ? Park your car farther away from your destination.  ? If you take public transportation, get off one stop early and walk the rest of the way.  ? Make phone calls while standing up and walking  around.  ? Take the stairs instead of elevators or escalators.  · Wear comfortable clothes and shoes with good support.  · Do not exercise so much that you hurt yourself, feel dizzy, or get very short of breath.  Where to find more information  · [place] Department of Health and Human Services: www.hhs.gov  · Centers for Disease Control and Prevention (CDC): www.cdc.gov  Contact a health care provider:  · Before starting a new exercise program.  · If you have questions or concerns about your weight.  · If you have a medical problem that keeps you from exercising.  Get help right away if you have any of the following while exercising:  · Injury.  · Dizziness.  · Difficulty breathing or shortness of breath that does not go away when you stop exercising.  · Chest pain.  · Rapid heartbeat.  Summary  · Being overweight increases your risk of heart disease, stroke, diabetes, high blood pressure, and several types of cancer.  · Losing weight happens when you burn more calories than you eat.  · Reducing the amount of calories you eat in addition to getting regular moderate or vigorous exercise each week helps you lose weight.  This information is not intended to replace advice given to you by your health care provider. Make sure you discuss any questions you have with your health care provider.  Document Released: 01/20/2012 Document Revised: 12/31/2018 Document Reviewed: 12/31/2018  [employer] Interactive Patient Education © 2019 [employer]      MyPlate from [employer]    MyPlate is an outline of a general healthy diet based on the 2010 Dietary Guidelines for Americans, from the U.[place] Department of Agriculture (USDA). It sets guidelines for how much food you should eat from each food group based on your age, sex, and level of physical activity.  What are tips for following MyPlate?  To follow MyPlate recommendations:  · Eat a wide variety of fruits and vegetables, grains, and protein foods.  · Serve smaller portions and eat less  food throughout the day.  · Limit portion sizes to avoid overeating.  · Enjoy your food.  · Get at least 150 minutes of exercise every week. This is about 30 minutes each day, 5 or more days per week.  It can be difficult to have every meal look like MyPlate. Think about MyPlate as eating guidelines for an entire day, rather than each individual meal.  Fruits and vegetables  · Make half of your plate fruits and vegetables.  · Eat many different colors of fruits and vegetables each day.  · For a 2,000 calorie daily food plan, eat:  ? 2½ cups of vegetables every day.  ? 2 cups of fruit every day.  · 1 cup is equal to:  ? 1 cup raw or cooked vegetables.  ? 1 cup raw fruit.  ? 1 medium-sized orange, apple, or banana.  ? 1 cup 100% fruit or vegetable juice.  ? 2 cups raw leafy greens, such as lettuce, spinach, or kale.  ? ½ cup dried fruit.  Grains  · One fourth of your plate should be grains.  · Make at least half of the grains you eat each day whole grains.  · For a 2,000 calorie daily food plan, eat 6 oz of grains every day.  · 1 oz is equal to:  ? 1 slice bread.  ? 1 cup cereal.  ? ½ cup cooked rice, cereal, or pasta.  Protein  · One fourth of your plate should be protein.  · Eat a wide variety of protein foods, including meat, poultry, fish, eggs, beans, nuts, and tofu.  · For a 2,000 calorie daily food plan, eat 5½ oz of protein every day.  · 1 oz is equal to:  ? 1 oz meat, poultry, or fish.  ? ¼ cup cooked beans.  ? 1 egg.  ? ½ oz nuts or seeds.  ? 1 Tbsp peanut butter.  Dairy  · Drink fat-free or low-fat (1%) milk.  · Eat or drink dairy as a side to meals.  · For a 2,000 calorie daily food plan, eat or drink 3 cups of dairy every day.  · 1 cup is equal to:  ? 1 cup milk, yogurt, cottage cheese, or soy milk (soy beverage).  ? 2 oz processed cheese.  ? 1½ oz natural cheese.  Fats, oils, salt, and sugars  · Only small amounts of oils are recommended.  · Avoid foods that are high in calories and low in nutritional  value (empty calories), like foods high in fat or added sugars.  · Choose foods that are low in salt (sodium). Choose foods that have less than 140 milligrams (mg) of sodium per serving.  · Drink water instead of sugary drinks. Drink enough water each day to keep your urine pale yellow.  Where to find support  · Work with your health care provider or a nutrition specialist (dietitian) to develop a customized eating plan that is right for you.  · Download an rich (mobile application) to help you track your daily food intake.  Where to find more information  · Go to ChooseMyPlate.gov for more information.  · Learn more and log your daily food intake according to MyPlate using [employer]: www.[employer].TalkSession.gov  Summary  · MyPlate is a general guideline for healthy eating from the USDA. It is based on the 2010 Dietary Guidelines for Americans.  · In general, fruits and vegetables should take up ½ of your plate, grains should take up ¼ of your plate, and protein should take up ¼ of your plate.  This information is not intended to replace advice given to you by your health care provider. Make sure you discuss any questions you have with your health care provider.  Document Released: 01/06/2009 Document Revised: 03/19/2018 Document Reviewed: 03/19/2018  [employer] Interactive Patient Education © 2019 [employer]      Calorie Counting for Weight Loss  Calories are units of energy. Your body needs a certain amount of calories from food to keep you going throughout the day. When you eat more calories than your body needs, your body stores the extra calories as fat. When you eat fewer calories than your body needs, your body burns fat to get the energy it needs.  Calorie counting means keeping track of how many calories you eat and drink each day. Calorie counting can be helpful if you need to lose weight. If you make sure to eat fewer calories than your body needs, you should lose weight. Ask your health care  provider what a healthy weight is for you.  For calorie counting to work, you will need to eat the right number of calories in a day in order to lose a healthy amount of weight per week. A dietitian can help you determine how many calories you need in a day and will give you suggestions on how to reach your calorie goal.  · A healthy amount of weight to lose per week is usually 1-2 lb (0.5-0.9 kg). This usually means that your daily calorie intake should be reduced by 500-750 calories.  · Eating 1,200 - 1,500 calories per day can help most women lose weight.  · Eating 1,500 - 1,800 calories per day can help most men lose weight.  What is my plan?  My goal is to have __________ calories per day.  If I have this many calories per day, I should lose around __________ pounds per week.  What do I need to know about calorie counting?  In order to meet your daily calorie goal, you will need to:  · Find out how many calories are in each food you would like to eat. Try to do this before you eat.  · Decide how much of the food you plan to eat.  · Write down what you ate and how many calories it had. Doing this is called keeping a food log.  To successfully lose weight, it is important to balance calorie counting with a healthy lifestyle that includes regular activity. Aim for 150 minutes of moderate exercise (such as walking) or 75 minutes of vigorous exercise (such as running) each week.  Where do I find calorie information?    The number of calories in a food can be found on a Nutrition Facts label. If a food does not have a Nutrition Facts label, try to look up the calories online or ask your dietitian for help.  Remember that calories are listed per serving. If you choose to have more than one serving of a food, you will have to multiply the calories per serving by the amount of servings you plan to eat. For example, the label on a package of bread might say that a serving size is 1 slice and that there are 90 calories in  "a serving. If you eat 1 slice, you will have eaten 90 calories. If you eat 2 slices, you will have eaten 180 calories.  How do I keep a food log?  Immediately after each meal, record the following information in your food log:  · What you ate. Don't forget to include toppings, sauces, and other extras on the food.  · How much you ate. This can be measured in cups, ounces, or number of items.  · How many calories each food and drink had.  · The total number of calories in the meal.  Keep your food log near you, such as in a small notebook in your pocket, or use a mobile rich or website. Some programs will calculate calories for you and show you how many calories you have left for the day to meet your goal.  What are some calorie counting tips?    · Use your calories on foods and drinks that will fill you up and not leave you hungry:  ? Some examples of foods that fill you up are nuts and nut butters, vegetables, lean proteins, and high-fiber foods like whole grains. High-fiber foods are foods with more than 5 g fiber per serving.  ? Drinks such as sodas, specialty coffee drinks, alcohol, and juices have a lot of calories, yet do not fill you up.  · Eat nutritious foods and avoid empty calories. Empty calories are calories you get from foods or beverages that do not have many vitamins or protein, such as candy, sweets, and soda. It is better to have a nutritious high-calorie food (such as an avocado) than a food with few nutrients (such as a bag of chips).  · Know how many calories are in the foods you eat most often. This will help you calculate calorie counts faster.  · Pay attention to calories in drinks. Low-calorie drinks include water and unsweetened drinks.  · Pay attention to nutrition labels for \"low fat\" or \"fat free\" foods. These foods sometimes have the same amount of calories or more calories than the full fat versions. They also often have added sugar, starch, or salt, to make up for flavor that was " removed with the fat.  · Find a way of tracking calories that works for you. Get creative. Try different apps or programs if writing down calories does not work for you.  What are some portion control tips?  · Know how many calories are in a serving. This will help you know how many servings of a certain food you can have.  · Use a measuring cup to measure serving sizes. You could also try weighing out portions on a kitchen scale. With time, you will be able to estimate serving sizes for some foods.  · Take some time to put servings of different foods on your favorite plates, bowls, and cups so you know what a serving looks like.  · Try not to eat straight from a bag or box. Doing this can lead to overeating. Put the amount you would like to eat in a cup or on a plate to make sure you are eating the right portion.  · Use smaller plates, glasses, and bowls to prevent overeating.  · Try not to multitask (for example, watch TV or use your computer) while eating. If it is time to eat, sit down at a table and enjoy your food. This will help you to know when you are full. It will also help you to be aware of what you are eating and how much you are eating.  What are tips for following this plan?  Reading food labels  · Check the calorie count compared to the serving size. The serving size may be smaller than what you are used to eating.  · Check the source of the calories. Make sure the food you are eating is high in vitamins and protein and low in saturated and trans fats.  Shopping  · Read nutrition labels while you shop. This will help you make healthy decisions before you decide to purchase your food.  · Make a grocery list and stick to it.  Cooking  · Try to cook your favorite foods in a healthier way. For example, try baking instead of frying.  · Use low-fat dairy products.  Meal planning  · Use more fruits and vegetables. Half of your plate should be fruits and vegetables.  · Include lean proteins like poultry and  "fish.  How do I count calories when eating out?  · Ask for smaller portion sizes.  · Consider sharing an entree and sides instead of getting your own entree.  · If you get your own entree, eat only half. Ask for a box at the beginning of your meal and put the rest of your entree in it so you are not tempted to eat it.  · If calories are listed on the menu, choose the lower calorie options.  · Choose dishes that include vegetables, fruits, whole grains, low-fat dairy products, and lean protein.  · Choose items that are boiled, broiled, grilled, or steamed. Stay away from items that are buttered, battered, fried, or served with cream sauce. Items labeled \"crispy\" are usually fried, unless stated otherwise.  · Choose water, low-fat milk, unsweetened iced tea, or other drinks without added sugar. If you want an alcoholic beverage, choose a lower calorie option such as a glass of wine or light beer.  · Ask for dressings, sauces, and syrups on the side. These are usually high in calories, so you should limit the amount you eat.  · If you want a salad, choose a garden salad and ask for grilled meats. Avoid extra toppings like handley, cheese, or fried items. Ask for the dressing on the side, or ask for olive oil and vinegar or lemon to use as dressing.  · Estimate how many servings of a food you are given. For example, a serving of cooked rice is ½ cup or about the size of half a baseball. Knowing serving sizes will help you be aware of how much food you are eating at restaurants. The list below tells you how big or small some common portion sizes are based on everyday objects:  ? 1 oz--4 stacked dice.  ? 3 oz--1 deck of cards.  ? 1 tsp--1 die.  ? 1 Tbsp--½ a ping-pong ball.  ? 2 Tbsp--1 ping-pong ball.  ? ½ cup--½ baseball.  ? 1 cup--1 baseball.  Summary  · Calorie counting means keeping track of how many calories you eat and drink each day. If you eat fewer calories than your body needs, you should lose weight.  · A " healthy amount of weight to lose per week is usually 1-2 lb (0.5-0.9 kg). This usually means reducing your daily calorie intake by 500-750 calories.  · The number of calories in a food can be found on a Nutrition Facts label. If a food does not have a Nutrition Facts label, try to look up the calories online or ask your dietitian for help.  · Use your calories on foods and drinks that will fill you up, and not on foods and drinks that will leave you hungry.  · Use smaller plates, glasses, and bowls to prevent overeating.  This information is not intended to replace advice given to you by your health care provider. Make sure you discuss any questions you have with your health care provider.  Document Released: 12/18/2006 Document Revised: 09/06/2019 Document Reviewed: 11/17/2017  [employer] Interactive Patient Education © 2019 [employer]

## 2020-01-14 ENCOUNTER — OFFICE VISIT (OUTPATIENT)
Dept: SLEEP MEDICINE | Facility: HOSPITAL | Age: 55
End: 2020-01-14
Attending: PSYCHIATRY & NEUROLOGY

## 2020-01-14 VITALS
BODY MASS INDEX: 37.65 KG/M2 | WEIGHT: 263 LBS | DIASTOLIC BLOOD PRESSURE: 78 MMHG | HEART RATE: 96 BPM | SYSTOLIC BLOOD PRESSURE: 137 MMHG | OXYGEN SATURATION: 97 % | HEIGHT: 70 IN

## 2020-01-14 DIAGNOSIS — G47.33 OSA (OBSTRUCTIVE SLEEP APNEA): Primary | ICD-10-CM

## 2020-01-14 PROCEDURE — G0463 HOSPITAL OUTPT CLINIC VISIT: HCPCS

## 2020-01-14 NOTE — PROGRESS NOTES
"Rashid Henderson  1965  [id number]     DATE OF VISIT:1/14/2020   HISTORY:  Patient has severe obstructive sleep apnea syndrome.  Polysomnography in the past has revealed apnea-hypopnea index of 94/sleep hour and minimum SpO2 of 69%. Patient was hypoxic for 55% of the study time. The patient had hypersomnia with Bel[place] Sleepiness Scale score of 11.     The patient was successfully treated with CPAP therapy and the patient is now on auto CPAP therapy with mean pressure of 13.8 cmH20 and AHI down to 0.6. Compliance data indicates excellent compliance with 94.4% usage for more than 4 hours with an average usage of 6 hours and 39 minutes. The patient is compliant and benefiting from it. The patient's Bel[place] Sleepiness Scale score is down to 1.     PHYSICAL EXAMINATION:  Vitals:    01/14/20 0700   BP: 137/78   Pulse: 96   SpO2: 97%   Weight: 119 kg (263 lb)   Height: 177.8 cm (70\")   Body mass index is 37.74 kg/m².   HEENT: Narrow oropharynx. Mallampati class III   NECK: Supple. No bruits.   CARDIAC: Normal.   LUNGS: Clear to auscultation.   EXTREMITIES: No edema.     IMPRESSION: Patient with severe obstructive sleep apnea syndrome successfully treated with auto CPAP therapy and the patient is compliant and benefiting from it.     RECOMMENDATIONS: Continue present auto CPAP. Followup 1 year.     Andrew Ni M.D.  1/14/2020         "

## 2020-02-05 RX ORDER — SITAGLIPTIN AND METFORMIN HYDROCHLORIDE 1000; 50 MG/1; MG/1
TABLET, FILM COATED ORAL
Qty: 180 TABLET | Refills: 1 | Status: SHIPPED | OUTPATIENT
Start: 2020-02-05 | End: 2020-07-21 | Stop reason: SDUPTHER

## 2020-03-03 RX ORDER — EMPAGLIFLOZIN 10 MG/1
TABLET, FILM COATED ORAL
Qty: 90 TABLET | Refills: 1 | Status: SHIPPED | OUTPATIENT
Start: 2020-03-03 | End: 2020-07-21 | Stop reason: DRUGHIGH

## 2020-03-03 RX ORDER — VALACYCLOVIR HYDROCHLORIDE 500 MG/1
TABLET, FILM COATED ORAL
Qty: 6 TABLET | Refills: 0 | Status: SHIPPED | OUTPATIENT
Start: 2020-03-03 | End: 2020-03-10

## 2020-03-10 RX ORDER — VALACYCLOVIR HYDROCHLORIDE 500 MG/1
TABLET, FILM COATED ORAL
Qty: 6 TABLET | Refills: 0 | Status: SHIPPED | OUTPATIENT
Start: 2020-03-10 | End: 2020-08-25

## 2020-03-12 DIAGNOSIS — E78.5 HYPERLIPIDEMIA, UNSPECIFIED HYPERLIPIDEMIA TYPE: Primary | ICD-10-CM

## 2020-03-12 DIAGNOSIS — E11.9 TYPE 2 DIABETES MELLITUS WITHOUT COMPLICATION, WITHOUT LONG-TERM CURRENT USE OF INSULIN (HCC): ICD-10-CM

## 2020-03-17 LAB
ALBUMIN SERPL-MCNC: 4.5 G/DL (ref 3.5–5.2)
ALBUMIN/GLOB SERPL: 1.7 G/DL
ALP SERPL-CCNC: 45 U/L (ref 39–117)
ALT SERPL-CCNC: 23 U/L (ref 1–41)
AST SERPL-CCNC: 13 U/L (ref 1–40)
BASOPHILS # BLD AUTO: 0.05 10*3/MM3 (ref 0–0.2)
BASOPHILS NFR BLD AUTO: 0.5 % (ref 0–1.5)
BILIRUB SERPL-MCNC: 0.8 MG/DL (ref 0.2–1.2)
BUN SERPL-MCNC: 18 MG/DL (ref 6–20)
BUN/CREAT SERPL: 18.9 (ref 7–25)
CALCIUM SERPL-MCNC: 9.9 MG/DL (ref 8.6–10.5)
CHLORIDE SERPL-SCNC: 95 MMOL/L (ref 98–107)
CHOLEST SERPL-MCNC: 191 MG/DL (ref 0–200)
CO2 SERPL-SCNC: 24 MMOL/L (ref 22–29)
CREAT SERPL-MCNC: 0.95 MG/DL (ref 0.76–1.27)
EOSINOPHIL # BLD AUTO: 0.23 10*3/MM3 (ref 0–0.4)
EOSINOPHIL NFR BLD AUTO: 2.3 % (ref 0.3–6.2)
ERYTHROCYTE [DISTWIDTH] IN BLOOD BY AUTOMATED COUNT: 13 % (ref 12.3–15.4)
GLOBULIN SER CALC-MCNC: 2.7 GM/DL
GLUCOSE SERPL-MCNC: 209 MG/DL (ref 65–99)
HBA1C MFR BLD: 7.3 % (ref 4.8–5.6)
HCT VFR BLD AUTO: 47 % (ref 37.5–51)
HDLC SERPL-MCNC: 89 MG/DL (ref 40–60)
HGB BLD-MCNC: 16.6 G/DL (ref 13–17.7)
IMM GRANULOCYTES # BLD AUTO: 0.03 10*3/MM3 (ref 0–0.05)
IMM GRANULOCYTES NFR BLD AUTO: 0.3 % (ref 0–0.5)
LDLC SERPL CALC-MCNC: 66 MG/DL (ref 0–100)
LDLC/HDLC SERPL: 0.74 {RATIO}
LYMPHOCYTES # BLD AUTO: 2.29 10*3/MM3 (ref 0.7–3.1)
LYMPHOCYTES NFR BLD AUTO: 23.3 % (ref 19.6–45.3)
MCH RBC QN AUTO: 31.2 PG (ref 26.6–33)
MCHC RBC AUTO-ENTMCNC: 35.3 G/DL (ref 31.5–35.7)
MCV RBC AUTO: 88.3 FL (ref 79–97)
MONOCYTES # BLD AUTO: 0.68 10*3/MM3 (ref 0.1–0.9)
MONOCYTES NFR BLD AUTO: 6.9 % (ref 5–12)
NEUTROPHILS # BLD AUTO: 6.56 10*3/MM3 (ref 1.7–7)
NEUTROPHILS NFR BLD AUTO: 66.7 % (ref 42.7–76)
NRBC BLD AUTO-RTO: 0 /100 WBC (ref 0–0.2)
PLATELET # BLD AUTO: 265 10*3/MM3 (ref 140–450)
POTASSIUM SERPL-SCNC: 4.5 MMOL/L (ref 3.5–5.2)
PROT SERPL-MCNC: 7.2 G/DL (ref 6–8.5)
RBC # BLD AUTO: 5.32 10*6/MM3 (ref 4.14–5.8)
SODIUM SERPL-SCNC: 135 MMOL/L (ref 136–145)
TRIGL SERPL-MCNC: 180 MG/DL (ref 0–150)
VLDLC SERPL CALC-MCNC: 36 MG/DL
WBC # BLD AUTO: 9.84 10*3/MM3 (ref 3.4–10.8)

## 2020-03-20 ENCOUNTER — OFFICE VISIT (OUTPATIENT)
Dept: FAMILY MEDICINE CLINIC | Facility: CLINIC | Age: 55
End: 2020-03-20

## 2020-03-20 VITALS
WEIGHT: 256.2 LBS | OXYGEN SATURATION: 97 % | DIASTOLIC BLOOD PRESSURE: 76 MMHG | RESPIRATION RATE: 16 BRPM | HEIGHT: 70 IN | HEART RATE: 82 BPM | BODY MASS INDEX: 36.68 KG/M2 | SYSTOLIC BLOOD PRESSURE: 112 MMHG | TEMPERATURE: 99.5 F

## 2020-03-20 DIAGNOSIS — E78.5 HYPERLIPIDEMIA, UNSPECIFIED HYPERLIPIDEMIA TYPE: ICD-10-CM

## 2020-03-20 DIAGNOSIS — I10 ESSENTIAL HYPERTENSION: ICD-10-CM

## 2020-03-20 DIAGNOSIS — E11.9 TYPE 2 DIABETES MELLITUS WITHOUT COMPLICATION, WITHOUT LONG-TERM CURRENT USE OF INSULIN (HCC): Primary | ICD-10-CM

## 2020-03-20 DIAGNOSIS — J02.9 SORE THROAT: ICD-10-CM

## 2020-03-20 DIAGNOSIS — K21.9 GASTROESOPHAGEAL REFLUX DISEASE, ESOPHAGITIS PRESENCE NOT SPECIFIED: ICD-10-CM

## 2020-03-20 PROCEDURE — 99214 OFFICE O/P EST MOD 30 MIN: CPT | Performed by: INTERNAL MEDICINE

## 2020-03-20 RX ORDER — AMOXICILLIN 875 MG/1
875 TABLET, COATED ORAL 2 TIMES DAILY
Qty: 20 TABLET | Refills: 0 | Status: SHIPPED | OUTPATIENT
Start: 2020-03-20 | End: 2020-07-21

## 2020-03-20 RX ORDER — OMEPRAZOLE 40 MG/1
40 CAPSULE, DELAYED RELEASE ORAL DAILY
Qty: 45 CAPSULE | Refills: 0 | Status: ON HOLD | OUTPATIENT
Start: 2020-03-20 | End: 2021-02-15 | Stop reason: SDUPTHER

## 2020-03-20 NOTE — PROGRESS NOTES
Subjective   Rashid Henderson is a 54 y.o. male who comes in today for   Chief Complaint   Patient presents with   • Bloated     pt states started last saturday    • Constipation   • Heartburn   • belching   • Vomiting   • Follow-up     hld   .    Diabetes   He has type 2 diabetes mellitus. No MedicAlert identification noted. The initial diagnosis of diabetes was made 11 years ago. Pertinent negatives for hypoglycemia include no confusion, dizziness, headaches, hunger, mood changes, nervousness/anxiousness, pallor, seizures, sleepiness, speech difficulty, sweats or tremors. Pertinent negatives for diabetes include no blurred vision, no chest pain, no fatigue, no foot paresthesias, no foot ulcerations, no polydipsia, no polyphagia, no polyuria, no visual change, no weakness and no weight loss. Pertinent negatives for hypoglycemia complications include no blackouts, no hospitalization, no nocturnal hypoglycemia, no required assistance and no required glucagon injection. Symptoms are stable. Pertinent negatives for diabetic complications include no CVA, heart disease, impotence, nephropathy, peripheral neuropathy, PVD or retinopathy. Risk factors for coronary artery disease include hypertension and obesity. Current diabetic treatment includes oral agent (dual therapy). He is compliant with treatment all of the time. His weight is stable. He is following a generally unhealthy diet. When asked about meal planning, he reported none. He has not had a previous visit with a dietitian. He participates in exercise three times a week. He monitors blood glucose at home 3-4 x per week. Blood glucose monitoring compliance is good. His home blood glucose trend is increasing steadily. His breakfast blood glucose is taken between 5-6 am. His breakfast blood glucose range is generally 140-180 mg/dl. He does not see a podiatrist.Eye exam is current.      Last weekend had 2 days of vomiting.  No vomiting since Monday.  Belching all week.  "Burning in stomach resolved for most part.  No bloody emesis.  No coffee ground emesis.  No nausea for the remaining week.  He has noticed that he gets full faster and last night had diarrhea.  Today temp 99.5 and he has a mild ST.  No ha and no coughing.  No recent travel and is working from home this week.  Saw his parents yesterday in [place].  Parents are well.  His labs were done in the midst of his acute viral illness and his sugar was 209. Since that day, his sugars have come down. FBS yesterday 135.  Weight is intentionally down and is down 7 pounds since January.  Taking jardiance 10mg qd and janumet 50/1000mg 2 tabs daily.    The following portions of the patient's history were reviewed and updated as appropriate: allergies, current medications, past family history, past medical history, past social history, past surgical history and problem list.    Review of Systems   Constitutional: Positive for fever (low grade today). Negative for fatigue and weight loss.   HENT: Positive for sore throat (mild started today).    Eyes: Negative for blurred vision.   Respiratory: Negative for cough.    Cardiovascular: Negative for chest pain.   Endocrine: Negative for polydipsia, polyphagia and polyuria.   Genitourinary: Negative for impotence.   Skin: Negative for pallor.   Neurological: Negative for dizziness, tremors, seizures, speech difficulty, weakness and headaches.   Psychiatric/Behavioral: Negative for confusion. The patient is not nervous/anxious.        /76   Pulse 82   Temp 99.5 °F (37.5 °C) (Oral)   Resp 16   Ht 177.8 cm (70\")   Wt 116 kg (256 lb 3.2 oz)   SpO2 97%   BMI 36.76 kg/m²     STEADI Fall Risk Assessment has not been completed.    PHQ-2/PHQ-9 Depression Screening 3/20/2020   Little interest or pleasure in doing things 0   Feeling down, depressed, or hopeless 0   Total Score 0       Objective   Physical Exam   Constitutional: He is oriented to person, place, and time. He appears " well-developed and well-nourished.   HENT:   Head: Normocephalic and atraumatic.   Mouth/Throat: Oropharynx is clear and moist.   TM 's dull without LR  Mild op redness and exudate on left tonsil   Eyes: Conjunctivae are normal.   Neck: Neck supple.   Cardiovascular: Normal rate, regular rhythm and normal heart sounds.   No bruits   Pulmonary/Chest: Effort normal and breath sounds normal. No respiratory distress. He has no wheezes. He has no rales.   Abdominal: Soft. Bowel sounds are normal. He exhibits no distension and no mass. There is no tenderness.   Lymphadenopathy:     He has no cervical adenopathy.   Neurological: He is alert and oriented to person, place, and time.   Skin: Skin is warm.   Psychiatric: He has a normal mood and affect. His behavior is normal. Judgment and thought content normal.   Nursing note and vitals reviewed.        Current Outpatient Medications:   •  ACCU-CHEK COMPACT PLUS test strip, TEST FAST BLOOD SUGAR D, Disp: , Rfl: 3  •  albuterol sulfate  (90 Base) MCG/ACT inhaler, Inhale 2 puffs 3 (Three) Times a Day., Disp: 1 inhaler, Rfl: 0  •  amoxicillin (AMOXIL) 875 MG tablet, Take 1 tablet by mouth 2 (Two) Times a Day., Disp: 20 tablet, Rfl: 0  •  fluticasone (FLONASE) 50 MCG/ACT nasal spray, 2 sprays into the nostril(s) as directed by provider Daily., Disp: 1 bottle, Rfl: 0  •  JANUMET  MG per tablet, TAKE 1 TABLET BY MOUTH TWICE DAILY, Disp: 180 tablet, Rfl: 1  •  JARDIANCE 10 MG tablet, TAKE 1 TABLET BY MOUTH EVERY DAY, Disp: 90 tablet, Rfl: 1  •  lisinopril (PRINIVIL,ZESTRIL) 10 MG tablet, Take 1 tablet by mouth Daily., Disp: 90 tablet, Rfl: 1  •  Omega-3 Fatty Acids (FISH OIL) 1000 MG capsule capsule, Take 3 capsules by mouth daily., Disp: , Rfl:   •  omeprazole (priLOSEC) 40 MG capsule, Take 1 capsule by mouth Daily., Disp: 45 capsule, Rfl: 0  •  valACYclovir (VALTREX) 500 MG tablet, TAKE 1 TABLET BY MOUTH TWICE DAILY FOR 3 DAYS, Disp: 6 tablet, Rfl:  0    Assessment/Plan   Rashid was seen today for bloated, constipation, heartburn, belching, vomiting and follow-up.    Diagnoses and all orders for this visit:    Type 2 diabetes mellitus without complication, without long-term current use of insulin (CMS/[facility])    Essential hypertension    Hyperlipidemia, unspecified hyperlipidemia type    Gastroesophageal reflux disease, esophagitis presence not specified    Sore throat    Other orders  -     omeprazole (priLOSEC) 40 MG capsule; Take 1 capsule by mouth Daily.  -     amoxicillin (AMOXIL) 875 MG tablet; Take 1 tablet by mouth 2 (Two) Times a Day.    we are limiting our testing for strep--could be strep due to temp and ST and exudate.  Start amoxil  Start omeprazole for 2-6 weeks for presumed post viral ARVIND.  If not improving, he will let me know.  On exam,  No belly pain or tenderness.  No distension.    Continue jardiance and janumet.  a1c decreasing and goal is less than 7%  Continue lisinopril 10 mg qd for HTN--well controlled  RTC 4 mo with labs prior                I have asked for the patient to return to clinic in 6month(s).  Answers for HPI/ROS submitted by the patient on 3/18/2020   Diabetes problem  What is the primary reason for your visit?: Diabetes

## 2020-06-01 RX ORDER — LISINOPRIL 10 MG/1
10 TABLET ORAL DAILY
Qty: 90 TABLET | Refills: 1 | Status: SHIPPED | OUTPATIENT
Start: 2020-06-01 | End: 2020-11-30

## 2020-07-16 DIAGNOSIS — E78.5 HYPERLIPIDEMIA, UNSPECIFIED HYPERLIPIDEMIA TYPE: Primary | ICD-10-CM

## 2020-07-16 DIAGNOSIS — I10 ESSENTIAL HYPERTENSION: ICD-10-CM

## 2020-07-16 DIAGNOSIS — E11.9 TYPE 2 DIABETES MELLITUS WITHOUT COMPLICATION, WITHOUT LONG-TERM CURRENT USE OF INSULIN (HCC): ICD-10-CM

## 2020-07-16 LAB
ALBUMIN SERPL-MCNC: 4.6 G/DL (ref 3.5–5.2)
ALBUMIN/GLOB SERPL: 1.9 G/DL
ALP SERPL-CCNC: 40 U/L (ref 39–117)
ALT SERPL-CCNC: 43 U/L (ref 1–41)
AST SERPL-CCNC: 28 U/L (ref 1–40)
BILIRUB SERPL-MCNC: 0.4 MG/DL (ref 0–1.2)
BUN SERPL-MCNC: 24 MG/DL (ref 6–20)
BUN/CREAT SERPL: 26.7 (ref 7–25)
CALCIUM SERPL-MCNC: 9.2 MG/DL (ref 8.6–10.5)
CHLORIDE SERPL-SCNC: 99 MMOL/L (ref 98–107)
CHOLEST SERPL-MCNC: 194 MG/DL (ref 0–200)
CO2 SERPL-SCNC: 24.7 MMOL/L (ref 22–29)
CREAT SERPL-MCNC: 0.9 MG/DL (ref 0.76–1.27)
GLOBULIN SER CALC-MCNC: 2.4 GM/DL
GLUCOSE SERPL-MCNC: 147 MG/DL (ref 65–99)
HBA1C MFR BLD: 7.5 % (ref 4.8–5.6)
HDLC SERPL-MCNC: 81 MG/DL (ref 40–60)
LDLC SERPL CALC-MCNC: 74 MG/DL (ref 0–100)
LDLC/HDLC SERPL: 0.91 {RATIO}
Lab: NORMAL
POTASSIUM SERPL-SCNC: 4.7 MMOL/L (ref 3.5–5.2)
PROT SERPL-MCNC: 7 G/DL (ref 6–8.5)
SODIUM SERPL-SCNC: 136 MMOL/L (ref 136–145)
TRIGL SERPL-MCNC: 196 MG/DL (ref 0–150)
TSH SERPL DL<=0.005 MIU/L-ACNC: 3.16 UIU/ML (ref 0.27–4.2)
VLDLC SERPL CALC-MCNC: 39.2 MG/DL

## 2020-07-21 ENCOUNTER — OFFICE VISIT (OUTPATIENT)
Dept: FAMILY MEDICINE CLINIC | Facility: CLINIC | Age: 55
End: 2020-07-21

## 2020-07-21 VITALS
DIASTOLIC BLOOD PRESSURE: 84 MMHG | SYSTOLIC BLOOD PRESSURE: 132 MMHG | BODY MASS INDEX: 37.74 KG/M2 | OXYGEN SATURATION: 97 % | WEIGHT: 263.6 LBS | HEIGHT: 70 IN | HEART RATE: 63 BPM | RESPIRATION RATE: 16 BRPM | TEMPERATURE: 98.1 F

## 2020-07-21 DIAGNOSIS — I10 ESSENTIAL HYPERTENSION: ICD-10-CM

## 2020-07-21 DIAGNOSIS — E78.5 HYPERLIPIDEMIA, UNSPECIFIED HYPERLIPIDEMIA TYPE: ICD-10-CM

## 2020-07-21 DIAGNOSIS — R74.8 ELEVATED LIVER ENZYMES: ICD-10-CM

## 2020-07-21 DIAGNOSIS — E11.9 TYPE 2 DIABETES MELLITUS WITHOUT COMPLICATION, WITHOUT LONG-TERM CURRENT USE OF INSULIN (HCC): ICD-10-CM

## 2020-07-21 DIAGNOSIS — L98.9 SKIN LESION: Primary | ICD-10-CM

## 2020-07-21 PROCEDURE — 99214 OFFICE O/P EST MOD 30 MIN: CPT | Performed by: INTERNAL MEDICINE

## 2020-07-21 NOTE — PROGRESS NOTES
Answers for HPI/ROS submitted by the patient on 7/14/2020   Diabetes problem  What is the primary reason for your visit?: Diabetes  Diabetes type: type 2  MedicAlert ID: No  Disease duration: 10 years  blurred vision: No  chest pain: No  fatigue: No  foot paresthesias: No  foot ulcerations: No  polydipsia: No  polyphagia: No  polyuria: No  visual change: No  weakness: No  weight loss: No  Symptom course: stable  confusion: No  dizziness: No  headaches: No  hunger: No  mood changes: No  nervous/anxious: No  pallor: No  seizures: No  sleepiness: No  speech difficulty: No  sweats: No  tremors: No  blackouts: No  hospitalization: No  nocturnal hypoglycemia: No  required assistance: No  required glucagon: No  CVA: No  heart disease: No  impotence: No  nephropathy: No  peripheral neuropathy: No  PVD: No  retinopathy: No  CAD risks: hypertension, obesity  Current treatments: oral agent (dual therapy)  Treatment compliance: most of the time  Home blood tests: 3-4 x per week  Monitoring compliance: good  Blood glucose trend: no change  breakfast time: 6-7 am  breakfast glucose level: 110-130  Weight trend: fluctuating minimally  Meal planning: none  Exercise: intermittently  Dietitian visit: No  Eye exam current: Yes  Sees podiatrist: No

## 2020-07-21 NOTE — PROGRESS NOTES
"Subjective   Rashid Henderson is a 54 y.o. male who comes in today for   Chief Complaint   Patient presents with   • Follow-up      dm2   .    Diabetes   He has type 2 diabetes mellitus. No MedicAlert identification noted. The initial diagnosis of diabetes was made 10 years ago. Pertinent negatives for hypoglycemia include no confusion, dizziness, headaches, hunger, mood changes, nervousness/anxiousness, pallor, seizures, sleepiness, speech difficulty, sweats or tremors. Pertinent negatives for diabetes include no blurred vision, no chest pain, no fatigue, no foot paresthesias, no foot ulcerations, no polydipsia, no polyphagia, no polyuria, no visual change, no weakness and no weight loss. Pertinent negatives for hypoglycemia complications include no blackouts, no hospitalization, no nocturnal hypoglycemia, no required assistance and no required glucagon injection. Symptoms are stable. Pertinent negatives for diabetic complications include no CVA, heart disease, impotence, nephropathy, peripheral neuropathy, PVD or retinopathy. Risk factors for coronary artery disease include hypertension and obesity. Current diabetic treatment includes oral agent (dual therapy). He is compliant with treatment most of the time. His weight is fluctuating minimally. When asked about meal planning, he reported none. He has not had a previous visit with a dietitian. He participates in exercise intermittently. He monitors blood glucose at home 3-4 x per week. Blood glucose monitoring compliance is good. There is no change in his home blood glucose trend. His breakfast blood glucose is taken between 6-7 am. His breakfast blood glucose range is generally 110-130 mg/dl. He does not see a podiatrist.Eye exam is current.      He is here for f/u on NIDDM on janumet 50/1000 bid and jardiance 10 mg qd and his wife feels like he is \"mean \" on the jardiance.  He is working from home for [employer] and is hoping to get back in the office. Having some " "emotional fatigue/decreased affect.  Drinking more alcohol he states.  Has gained weight.  Doesn't check sugars daily but running 130-150.  On his left anterior shin, has a raised skin lesion that didn't clear with cortisone and been there a few months.    The following portions of the patient's history were reviewed and updated as appropriate: allergies, current medications, past family history, past medical history, past social history, past surgical history and problem list.    Review of Systems   Constitutional: Negative for fatigue and weight loss.   Eyes: Negative for blurred vision.   Cardiovascular: Negative for chest pain.   Endocrine: Negative for polydipsia, polyphagia and polyuria.   Genitourinary: Negative for impotence.   Skin: Negative for pallor.   Neurological: Negative for dizziness, tremors, seizures, speech difficulty, weakness and headaches.   Psychiatric/Behavioral: Negative for confusion. The patient is not nervous/anxious.        /84   Pulse 63   Temp 98.1 °F (36.7 °C) (Temporal)   Resp 16   Ht 177.8 cm (70\")   Wt 120 kg (263 lb 9.6 oz)   SpO2 97%   BMI 37.82 kg/m²     STEADI Fall Risk Assessment has not been completed.    PHQ-2/PHQ-9 Depression Screening 3/20/2020   Little interest or pleasure in doing things 0   Feeling down, depressed, or hopeless 0   Total Score 0       Objective   Physical Exam   Constitutional: He is oriented to person, place, and time. He appears well-developed and well-nourished.   HENT:   Head: Normocephalic and atraumatic.   Eyes: Conjunctivae are normal.   Neck: Neck supple.   Cardiovascular: Normal rate, regular rhythm and normal heart sounds.   No bruits   Pulmonary/Chest: Effort normal and breath sounds normal. No respiratory distress. He has no wheezes. He has no rales.   Abdominal: Soft. Bowel sounds are normal. He exhibits no distension and no mass. There is no tenderness.   Lymphadenopathy:     He has no cervical adenopathy.   Neurological: He " is alert and oriented to person, place, and time.   Skin: Skin is warm.   Psychiatric: He has a normal mood and affect. His behavior is normal. Judgment and thought content normal.   Nursing note and vitals reviewed.        Current Outpatient Medications:   •  ACCU-CHEK COMPACT PLUS test strip, TEST FAST BLOOD SUGAR D, Disp: , Rfl: 3  •  albuterol sulfate  (90 Base) MCG/ACT inhaler, Inhale 2 puffs 3 (Three) Times a Day., Disp: 1 inhaler, Rfl: 0  •  fluticasone (FLONASE) 50 MCG/ACT nasal spray, 2 sprays into the nostril(s) as directed by provider Daily., Disp: 1 bottle, Rfl: 0  •  lisinopril (PRINIVIL,ZESTRIL) 10 MG tablet, TAKE 1 TABLET BY MOUTH DAILY, Disp: 90 tablet, Rfl: 1  •  Omega-3 Fatty Acids (FISH OIL) 1000 MG capsule capsule, Take 3 capsules by mouth daily., Disp: , Rfl:   •  omeprazole (priLOSEC) 40 MG capsule, Take 1 capsule by mouth Daily., Disp: 45 capsule, Rfl: 0  •  sitaGLIPtin-metFORMIN (Janumet)  MG per tablet, Take 1 tablet by mouth 2 (Two) Times a Day., Disp: 180 tablet, Rfl: 1  •  valACYclovir (VALTREX) 500 MG tablet, TAKE 1 TABLET BY MOUTH TWICE DAILY FOR 3 DAYS, Disp: 6 tablet, Rfl: 0  •  Empagliflozin (Jardiance) 25 MG tablet, Take 25 mg by mouth Daily., Disp: 30 tablet, Rfl: 5    Assessment/Plan   Rashid was seen today for follow-up.    Diagnoses and all orders for this visit:    Skin lesion  -     Ambulatory Referral to Dermatology    Type 2 diabetes mellitus without complication, without long-term current use of insulin (CMS/[facility])    Essential hypertension    Hyperlipidemia, unspecified hyperlipidemia type    Elevated liver enzymes    Other orders  -     Empagliflozin (Jardiance) 25 MG tablet; Take 25 mg by mouth Daily.  -     sitaGLIPtin-metFORMIN (Janumet)  MG per tablet; Take 1 tablet by mouth 2 (Two) Times a Day.      We had a good discussion about the progression of diabetes without proper treatment and lifestyle changes.  I also challenged him to 3 things prior to  our next visit in 3 mo:  Start exercising even if it is for 5-10 minutes day, decrease alcohol and or carbs/sweets, and start checking FBS daily for better feedback on his eating and sugars.  I have increased jardiance from 10 to 25 mg qd and he will continue janumet.  Refills provided.  Also, I have advised him to increase hydration with jardiance in order to prevent dehydration and fungal infections.  I am hopeful that his very minimal LFT elevation will normalize once his diet and alcohol consumption change.  Will keep a close eye on labs and f/u in 3 mo             I have asked for the patient to return to clinic in 6month(s).  Answers for HPI/ROS submitted by the patient on 7/14/2020   Diabetes problem  What is the primary reason for your visit?: Diabetes

## 2020-08-21 RX ORDER — EMPAGLIFLOZIN 10 MG/1
TABLET, FILM COATED ORAL
Qty: 90 TABLET | Refills: 1 | OUTPATIENT
Start: 2020-08-21

## 2020-08-25 RX ORDER — VALACYCLOVIR HYDROCHLORIDE 500 MG/1
TABLET, FILM COATED ORAL
Qty: 6 TABLET | Refills: 0 | Status: SHIPPED | OUTPATIENT
Start: 2020-08-25 | End: 2020-10-22

## 2020-10-08 DIAGNOSIS — Z11.59 NEED FOR HEPATITIS C SCREENING TEST: ICD-10-CM

## 2020-10-08 DIAGNOSIS — E11.9 TYPE 2 DIABETES MELLITUS WITHOUT COMPLICATION, WITHOUT LONG-TERM CURRENT USE OF INSULIN (HCC): ICD-10-CM

## 2020-10-08 DIAGNOSIS — E78.5 HYPERLIPIDEMIA, UNSPECIFIED HYPERLIPIDEMIA TYPE: Primary | ICD-10-CM

## 2020-10-08 DIAGNOSIS — I10 ESSENTIAL HYPERTENSION: ICD-10-CM

## 2020-10-13 ENCOUNTER — RESULTS ENCOUNTER (OUTPATIENT)
Dept: FAMILY MEDICINE CLINIC | Facility: CLINIC | Age: 55
End: 2020-10-13

## 2020-10-13 DIAGNOSIS — Z11.59 NEED FOR HEPATITIS C SCREENING TEST: ICD-10-CM

## 2020-10-13 DIAGNOSIS — E11.9 TYPE 2 DIABETES MELLITUS WITHOUT COMPLICATION, WITHOUT LONG-TERM CURRENT USE OF INSULIN (HCC): ICD-10-CM

## 2020-10-13 DIAGNOSIS — I10 ESSENTIAL HYPERTENSION: ICD-10-CM

## 2020-10-13 DIAGNOSIS — E78.5 HYPERLIPIDEMIA, UNSPECIFIED HYPERLIPIDEMIA TYPE: ICD-10-CM

## 2020-10-14 ENCOUNTER — RESULTS ENCOUNTER (OUTPATIENT)
Dept: FAMILY MEDICINE CLINIC | Facility: CLINIC | Age: 55
End: 2020-10-14

## 2020-10-14 DIAGNOSIS — E11.9 TYPE 2 DIABETES MELLITUS WITHOUT COMPLICATION, WITHOUT LONG-TERM CURRENT USE OF INSULIN (HCC): ICD-10-CM

## 2020-10-15 ENCOUNTER — LAB (OUTPATIENT)
Dept: LAB | Facility: HOSPITAL | Age: 55
End: 2020-10-15

## 2020-10-15 LAB
ALBUMIN SERPL-MCNC: 4.4 G/DL (ref 3.5–5.2)
ALBUMIN UR-MCNC: <1.2 MG/DL
ALBUMIN/GLOB SERPL: 1.5 G/DL
ALP SERPL-CCNC: 49 U/L (ref 39–117)
ALT SERPL W P-5'-P-CCNC: 31 U/L (ref 1–41)
ANION GAP SERPL CALCULATED.3IONS-SCNC: 10.8 MMOL/L (ref 5–15)
AST SERPL-CCNC: 21 U/L (ref 1–40)
BASOPHILS # BLD AUTO: 0.06 10*3/MM3 (ref 0–0.2)
BASOPHILS NFR BLD AUTO: 0.9 % (ref 0–1.5)
BILIRUB SERPL-MCNC: 0.4 MG/DL (ref 0–1.2)
BUN SERPL-MCNC: 25 MG/DL (ref 6–20)
BUN/CREAT SERPL: 27.2 (ref 7–25)
CALCIUM SPEC-SCNC: 9.5 MG/DL (ref 8.6–10.5)
CHLORIDE SERPL-SCNC: 103 MMOL/L (ref 98–107)
CHOLEST SERPL-MCNC: 177 MG/DL (ref 0–200)
CO2 SERPL-SCNC: 23.2 MMOL/L (ref 22–29)
CREAT SERPL-MCNC: 0.92 MG/DL (ref 0.76–1.27)
DEPRECATED RDW RBC AUTO: 37.2 FL (ref 37–54)
EOSINOPHIL # BLD AUTO: 0.49 10*3/MM3 (ref 0–0.4)
EOSINOPHIL NFR BLD AUTO: 7.6 % (ref 0.3–6.2)
ERYTHROCYTE [DISTWIDTH] IN BLOOD BY AUTOMATED COUNT: 11.5 % (ref 12.3–15.4)
GFR SERPL CREATININE-BSD FRML MDRD: 86 ML/MIN/1.73
GLOBULIN UR ELPH-MCNC: 2.9 GM/DL
GLUCOSE SERPL-MCNC: 166 MG/DL (ref 65–99)
HBA1C MFR BLD: 7.48 % (ref 4.8–5.6)
HCT VFR BLD AUTO: 44.5 % (ref 37.5–51)
HDLC SERPL-MCNC: 60 MG/DL (ref 40–60)
HGB BLD-MCNC: 15.4 G/DL (ref 13–17.7)
IMM GRANULOCYTES # BLD AUTO: 0.02 10*3/MM3 (ref 0–0.05)
IMM GRANULOCYTES NFR BLD AUTO: 0.3 % (ref 0–0.5)
LDLC SERPL CALC-MCNC: 89 MG/DL (ref 0–100)
LDLC/HDLC SERPL: 1.39 {RATIO}
LYMPHOCYTES # BLD AUTO: 2.16 10*3/MM3 (ref 0.7–3.1)
LYMPHOCYTES NFR BLD AUTO: 33.4 % (ref 19.6–45.3)
MCH RBC QN AUTO: 30.3 PG (ref 26.6–33)
MCHC RBC AUTO-ENTMCNC: 34.6 G/DL (ref 31.5–35.7)
MCV RBC AUTO: 87.6 FL (ref 79–97)
MONOCYTES # BLD AUTO: 0.47 10*3/MM3 (ref 0.1–0.9)
MONOCYTES NFR BLD AUTO: 7.3 % (ref 5–12)
NEUTROPHILS NFR BLD AUTO: 3.27 10*3/MM3 (ref 1.7–7)
NEUTROPHILS NFR BLD AUTO: 50.5 % (ref 42.7–76)
NRBC BLD AUTO-RTO: 0 /100 WBC (ref 0–0.2)
PLATELET # BLD AUTO: 233 10*3/MM3 (ref 140–450)
PMV BLD AUTO: 9 FL (ref 6–12)
POTASSIUM SERPL-SCNC: 4.5 MMOL/L (ref 3.5–5.2)
PROT SERPL-MCNC: 7.3 G/DL (ref 6–8.5)
RBC # BLD AUTO: 5.08 10*6/MM3 (ref 4.14–5.8)
SODIUM SERPL-SCNC: 137 MMOL/L (ref 136–145)
T4 FREE SERPL-MCNC: 1.62 NG/DL (ref 0.93–1.7)
TRIGL SERPL-MCNC: 167 MG/DL (ref 0–150)
TSH SERPL DL<=0.05 MIU/L-ACNC: 4.28 UIU/ML (ref 0.27–4.2)
VLDLC SERPL-MCNC: 28 MG/DL (ref 5–40)
WBC # BLD AUTO: 6.47 10*3/MM3 (ref 3.4–10.8)

## 2020-10-15 PROCEDURE — 36415 COLL VENOUS BLD VENIPUNCTURE: CPT | Performed by: INTERNAL MEDICINE

## 2020-10-15 PROCEDURE — 80061 LIPID PANEL: CPT | Performed by: INTERNAL MEDICINE

## 2020-10-15 PROCEDURE — 85025 COMPLETE CBC W/AUTO DIFF WBC: CPT | Performed by: INTERNAL MEDICINE

## 2020-10-15 PROCEDURE — 86803 HEPATITIS C AB TEST: CPT | Performed by: INTERNAL MEDICINE

## 2020-10-15 PROCEDURE — 83036 HEMOGLOBIN GLYCOSYLATED A1C: CPT | Performed by: INTERNAL MEDICINE

## 2020-10-15 PROCEDURE — 82043 UR ALBUMIN QUANTITATIVE: CPT | Performed by: INTERNAL MEDICINE

## 2020-10-15 PROCEDURE — 84439 ASSAY OF FREE THYROXINE: CPT | Performed by: INTERNAL MEDICINE

## 2020-10-15 PROCEDURE — 80053 COMPREHEN METABOLIC PANEL: CPT | Performed by: INTERNAL MEDICINE

## 2020-10-15 PROCEDURE — 84443 ASSAY THYROID STIM HORMONE: CPT | Performed by: INTERNAL MEDICINE

## 2020-10-16 LAB
HCV AB S/CO SERPL IA: <0.1 S/CO RATIO (ref 0–0.9)
HCV AB SERPL QL IA: NORMAL

## 2020-10-21 ENCOUNTER — OFFICE VISIT (OUTPATIENT)
Dept: FAMILY MEDICINE CLINIC | Facility: CLINIC | Age: 55
End: 2020-10-21

## 2020-10-21 VITALS
HEART RATE: 76 BPM | TEMPERATURE: 97.5 F | SYSTOLIC BLOOD PRESSURE: 126 MMHG | OXYGEN SATURATION: 98 % | HEIGHT: 70 IN | DIASTOLIC BLOOD PRESSURE: 76 MMHG | WEIGHT: 260.8 LBS | RESPIRATION RATE: 16 BRPM | BODY MASS INDEX: 37.34 KG/M2

## 2020-10-21 DIAGNOSIS — I10 ESSENTIAL HYPERTENSION: ICD-10-CM

## 2020-10-21 DIAGNOSIS — Z12.5 PROSTATE CANCER SCREENING: ICD-10-CM

## 2020-10-21 DIAGNOSIS — E11.9 TYPE 2 DIABETES MELLITUS WITHOUT COMPLICATION, WITHOUT LONG-TERM CURRENT USE OF INSULIN (HCC): ICD-10-CM

## 2020-10-21 DIAGNOSIS — R74.8 ELEVATED LIVER ENZYMES: ICD-10-CM

## 2020-10-21 DIAGNOSIS — Z23 NEED FOR IMMUNIZATION AGAINST INFLUENZA: ICD-10-CM

## 2020-10-21 DIAGNOSIS — Z00.00 WELL ADULT EXAM: ICD-10-CM

## 2020-10-21 DIAGNOSIS — E78.5 HYPERLIPIDEMIA, UNSPECIFIED HYPERLIPIDEMIA TYPE: Primary | ICD-10-CM

## 2020-10-21 DIAGNOSIS — I10 ESSENTIAL HYPERTENSION: Primary | ICD-10-CM

## 2020-10-21 PROCEDURE — 99396 PREV VISIT EST AGE 40-64: CPT | Performed by: INTERNAL MEDICINE

## 2020-10-21 PROCEDURE — 90686 IIV4 VACC NO PRSV 0.5 ML IM: CPT | Performed by: INTERNAL MEDICINE

## 2020-10-21 PROCEDURE — 90471 IMMUNIZATION ADMIN: CPT | Performed by: INTERNAL MEDICINE

## 2020-10-21 NOTE — PROGRESS NOTES
Answers for HPI/ROS submitted by the patient on 10/20/2020   Diabetes problem  What is the primary reason for your visit?: Diabetes  Diabetes type: type 2  MedicAlert ID: No  Disease duration: 10 years  blurred vision: No  chest pain: No  fatigue: No  foot paresthesias: No  foot ulcerations: No  polydipsia: No  polyphagia: No  polyuria: No  visual change: No  weakness: No  weight loss: No  Symptom course: stable  confusion: No  dizziness: No  headaches: No  hunger: No  mood changes: No  nervous/anxious: No  pallor: No  seizures: No  sleepiness: No  speech difficulty: No  sweats: No  tremors: No  blackouts: No  hospitalization: No  nocturnal hypoglycemia: No  required assistance: No  required glucagon: No  CVA: No  heart disease: No  impotence: No  nephropathy: No  peripheral neuropathy: No  PVD: No  retinopathy: No  CAD risks: dyslipidemia, hypertension, obesity, sedentary lifestyle  Current treatments: oral agent (dual therapy)  Treatment compliance: most of the time  Home blood tests: 1-2 x per day  Monitoring compliance: adequate  Blood glucose trend: no change  breakfast time: 7-8 am  breakfast glucose level: 130-140  Weight trend: stable  Current diet: generally healthy  Meal planning: none  Exercise: intermittently  Dietitian visit: No  Eye exam current: Yes  Sees podiatrist: No  Subjective   Rashid Henderson is a 54 y.o. male who comes in today for No chief complaint on file.  .    Diabetes  He has type 2 diabetes mellitus. No MedicAlert identification noted. The initial diagnosis of diabetes was made 10 years ago. Pertinent negatives for hypoglycemia include no confusion, dizziness, headaches, hunger, mood changes, nervousness/anxiousness, pallor, seizures, sleepiness, speech difficulty, sweats or tremors. Pertinent negatives for diabetes include no blurred vision, no chest pain, no fatigue, no foot paresthesias, no foot ulcerations, no polydipsia, no polyphagia, no polyuria, no visual change, no weakness and no  weight loss. Pertinent negatives for hypoglycemia complications include no blackouts, no hospitalization, no nocturnal hypoglycemia, no required assistance and no required glucagon injection. Symptoms are stable. Pertinent negatives for diabetic complications include no CVA, heart disease, impotence, nephropathy, peripheral neuropathy, PVD or retinopathy. Risk factors for coronary artery disease include dyslipidemia, hypertension, obesity and sedentary lifestyle. Current diabetic treatment includes oral agent (dual therapy). He is compliant with treatment most of the time. His weight is stable. He is following a generally healthy diet. When asked about meal planning, he reported none. He has not had a previous visit with a dietitian. He participates in exercise intermittently. He monitors blood glucose at home 1-2 x per day. Blood glucose monitoring compliance is adequate. There is no change in his home blood glucose trend. His breakfast blood glucose is taken between 7-8 am. His breakfast blood glucose range is generally 130-140 mg/dl. He does not see a podiatrist.Eye exam is current.       here for f/u on NIDDM, HTN and HL.  Hasn't been able to exercise.  Is working from home for [employer].  He has mostly stopped alcohol for the past month--not completely stopped.  He is checking FBS at home and run 130-160 on average.  Has lost 3 pounds.    The following portions of the patient's history were reviewed and updated as appropriate: allergies, current medications, past family history, past medical history, past social history, past surgical history and problem list.    Review of Systems   Constitutional: Negative for fatigue and weight loss.   Eyes: Negative for blurred vision.   Cardiovascular: Negative for chest pain.   Endocrine: Negative for polydipsia, polyphagia and polyuria.   Genitourinary: Negative for impotence.   Skin: Negative for pallor.   Neurological: Negative for dizziness, tremors, seizures, speech  "difficulty, weakness and headaches.   Psychiatric/Behavioral: Negative for confusion. The patient is not nervous/anxious.        /76   Pulse 76   Temp 97.5 °F (36.4 °C) (Temporal)   Resp 16   Ht 177.8 cm (70\")   Wt 118 kg (260 lb 12.8 oz)   SpO2 98%   BMI 37.42 kg/m²     STEADI Fall Risk Assessment has not been completed.    PHQ-2/PHQ-9 Depression Screening 3/20/2020   Little interest or pleasure in doing things 0   Feeling down, depressed, or hopeless 0   Total Score 0       Objective   Physical Exam  Vitals signs and nursing note reviewed.   Constitutional:       Appearance: Normal appearance. He is well-developed.   HENT:      Head: Normocephalic and atraumatic.      Right Ear: External ear normal.      Left Ear: External ear normal.   Eyes:      Extraocular Movements: Extraocular movements intact.      Conjunctiva/sclera: Conjunctivae normal.   Neck:      Musculoskeletal: Neck supple.      Vascular: No carotid bruit.   Cardiovascular:      Rate and Rhythm: Normal rate and regular rhythm.      Pulses:           Dorsalis pedis pulses are 2+ on the right side and 2+ on the left side.        Posterior tibial pulses are 2+ on the right side and 2+ on the left side.      Heart sounds: Normal heart sounds.      Comments: No bruits  Pulmonary:      Effort: Pulmonary effort is normal. No respiratory distress.      Breath sounds: Normal breath sounds. No wheezing or rales.   Abdominal:      General: Bowel sounds are normal. There is no distension.      Palpations: Abdomen is soft. There is no mass.      Tenderness: There is no abdominal tenderness.   Feet:      Right foot:      Skin integrity: Skin integrity normal. No ulcer or callus.      Left foot:      Skin integrity: Skin integrity normal. No ulcer or callus.      Comments: Normal monofilament bilaterally  Lymphadenopathy:      Cervical: No cervical adenopathy.   Skin:     General: Skin is warm.   Neurological:      General: No focal deficit present.     "  Mental Status: He is alert and oriented to person, place, and time.   Psychiatric:         Mood and Affect: Mood normal.         Behavior: Behavior normal.         Thought Content: Thought content normal.         Judgment: Judgment normal.           Current Outpatient Medications:   •  albuterol sulfate  (90 Base) MCG/ACT inhaler, Inhale 2 puffs 3 (Three) Times a Day., Disp: 1 inhaler, Rfl: 0  •  Empagliflozin (Jardiance) 25 MG tablet, Take 25 mg by mouth Daily., Disp: 30 tablet, Rfl: 5  •  fluticasone (FLONASE) 50 MCG/ACT nasal spray, 2 sprays into the nostril(s) as directed by provider Daily., Disp: 1 bottle, Rfl: 0  •  lisinopril (PRINIVIL,ZESTRIL) 10 MG tablet, TAKE 1 TABLET BY MOUTH DAILY, Disp: 90 tablet, Rfl: 1  •  Omega-3 Fatty Acids (FISH OIL) 1000 MG capsule capsule, Take 3 capsules by mouth daily., Disp: , Rfl:   •  omeprazole (priLOSEC) 40 MG capsule, Take 1 capsule by mouth Daily., Disp: 45 capsule, Rfl: 0  •  sitaGLIPtin-metFORMIN (Janumet)  MG per tablet, Take 1 tablet by mouth 2 (Two) Times a Day., Disp: 180 tablet, Rfl: 1  •  valACYclovir (VALTREX) 500 MG tablet, TAKE 1 TABLET BY MOUTH TWICE DAILY FOR 3 DAYS, Disp: 6 tablet, Rfl: 0  •  ACCU-CHEK COMPACT PLUS test strip, TEST FAST BLOOD SUGAR D, Disp: , Rfl: 3    Assessment/Plan   Diagnoses and all orders for this visit:    1. Hyperlipidemia, unspecified hyperlipidemia type (Primary)    2. Essential hypertension    3. Type 2 diabetes mellitus without complication, without long-term current use of insulin (CMS/[facility])    4. Elevated liver enzymes    5. Need for immunization against influenza  -     Fluarix Quad >6 Months ([id number])    6. Prostate cancer screening  -     Ambulatory Referral to Urology        Patient has made progress with his labs albeit slow.  His A1c is mildly reduced and his weight is down 3 pounds.  His liver enzymes normalized with the elimination of daily regular alcohol consumption.  His cholesterol is to goal  without statin therapy.  Again to give him a flu shot today.  He is due for a prostate screening and I have referred him to Dr. Brian Martinez for that.  He declines other vaccinations at this time and would like to do them at a later date.  We did discuss that he is due for a tetanus and his shingles vaccine series.  I also discussed with him that pneumococcal 23 is indicated for him due to comorbidities but he declines at this time.  He is up-to-date on his colonoscopy which was done in 2019.  I will see him back in 3 to 4 months and I have encouraged him to start exercising, continue checking daily blood sugar and making note of his dietary choices when he sees high blood sugar readings the following morning, and reducing carbs in his diet.  I am hopeful that we can get his A1c down to 7%.  Blood pressure is controlled on lisinopril 10 mg daily.           I have asked for the patient to return to clinic in 6month(s).

## 2020-10-22 LAB
Lab: NORMAL
THYROGLOB AB SERPL-ACNC: <1 IU/ML (ref 0–0.9)
THYROPEROXIDASE AB SERPL-ACNC: <9 IU/ML (ref 0–34)
WRITTEN AUTHORIZATION: NORMAL

## 2020-10-22 RX ORDER — VALACYCLOVIR HYDROCHLORIDE 500 MG/1
TABLET, FILM COATED ORAL
Qty: 6 TABLET | Refills: 0 | Status: SHIPPED | OUTPATIENT
Start: 2020-10-22 | End: 2021-02-08

## 2020-10-26 ENCOUNTER — RESULTS ENCOUNTER (OUTPATIENT)
Dept: FAMILY MEDICINE CLINIC | Facility: CLINIC | Age: 55
End: 2020-10-26

## 2020-10-26 ENCOUNTER — TELEPHONE (OUTPATIENT)
Dept: FAMILY MEDICINE CLINIC | Facility: CLINIC | Age: 55
End: 2020-10-26

## 2020-10-26 DIAGNOSIS — I10 ESSENTIAL HYPERTENSION: ICD-10-CM

## 2020-10-26 NOTE — TELEPHONE ENCOUNTER
Added 10/23    ----- Message from Nyla Al MD sent at 10/21/2020  8:14 AM EDT -----  Regarding: thyroid antibodies  Can you add on thyroid antibodies to his labs due to elev TSH?  thanks

## 2020-11-30 RX ORDER — LISINOPRIL 10 MG/1
10 TABLET ORAL DAILY
Qty: 90 TABLET | Refills: 1 | Status: SHIPPED | OUTPATIENT
Start: 2020-11-30 | End: 2021-06-01

## 2021-01-12 ENCOUNTER — OFFICE VISIT (OUTPATIENT)
Dept: SLEEP MEDICINE | Facility: HOSPITAL | Age: 56
End: 2021-01-12

## 2021-01-12 VITALS
BODY MASS INDEX: 36.94 KG/M2 | HEIGHT: 70 IN | DIASTOLIC BLOOD PRESSURE: 85 MMHG | HEART RATE: 86 BPM | OXYGEN SATURATION: 97 % | SYSTOLIC BLOOD PRESSURE: 133 MMHG | WEIGHT: 258 LBS

## 2021-01-12 DIAGNOSIS — E66.01 MORBIDLY OBESE (HCC): ICD-10-CM

## 2021-01-12 DIAGNOSIS — G47.33 OSA (OBSTRUCTIVE SLEEP APNEA): Primary | ICD-10-CM

## 2021-01-12 PROCEDURE — G0463 HOSPITAL OUTPT CLINIC VISIT: HCPCS

## 2021-01-12 NOTE — PROGRESS NOTES
"Rashid Henderson  1965  [id number]     DATE OF VISIT:1/12/2021   HISTORY:  Mr. Rashid Henderson is a 55 y.o.  male has a history of hypertension, hyperlipidemia and obstructive sleep apnea syndrome and is here for follow-up.      Patient has severe obstructive sleep apnea syndrome.  Polysomnography in the past has revealed apnea-hypopnea index of 94/sleep hour and minimum SpO2 of 69%. Patient was hypoxic for 55% of the study time. The patient had hypersomnia with [place] Sleepiness Scale score of 11.     The patient was successfully treated with CPAP therapy and the patient is now on auto CPAP therapy with mean pressure of 13.8 cmH20 and AHI down to 0.6. Compliance data indicates excellent compliance with 94.4% usage for more than 4 hours with an average usage of 6 hours and 39 minutes. The patient is compliant and benefiting from it. The patient's Concordia Sleepiness Scale score is down to 2.     Sleep schedule: Bedtime 10:30 PM-6:30 AM.  Sleep latency 15 minutes.  Gets about  7 hours and 30 minutes of sleep.    Caffeine intake: 4 cups of coffee in the morning and may be a diet soda.    PHYSICAL EXAMINATION:  Vitals:    01/12/21 0833   BP: 133/85   Pulse: 86   SpO2: 97%   Weight: 117 kg (258 lb)   Height: 177.8 cm (70\")   Body mass index is 37.02 kg/m².   HEENT: Narrow oropharynx. Mallampati class III   NECK: Supple. No bruits.   EXTREMITIES: No edema.     IMPRESSION: Patient with severe obstructive sleep apnea syndrome successfully treated with auto CPAP therapy and the patient is compliant and benefiting from it.     RECOMMENDATIONS: Continue present auto CPAP. Followup 1 year.     Andrew Ni M.D.  1/12/2021         "

## 2021-01-19 RX ORDER — SITAGLIPTIN AND METFORMIN HYDROCHLORIDE 1000; 50 MG/1; MG/1
TABLET, FILM COATED ORAL
Qty: 180 TABLET | Refills: 1 | Status: SHIPPED | OUTPATIENT
Start: 2021-01-19 | End: 2021-02-23 | Stop reason: ALTCHOICE

## 2021-02-01 RX ORDER — EMPAGLIFLOZIN 25 MG/1
TABLET, FILM COATED ORAL
Qty: 30 TABLET | Refills: 5 | Status: SHIPPED | OUTPATIENT
Start: 2021-02-01 | End: 2021-06-30 | Stop reason: SDUPTHER

## 2021-02-08 RX ORDER — VALACYCLOVIR HYDROCHLORIDE 500 MG/1
TABLET, FILM COATED ORAL
Qty: 6 TABLET | Refills: 0 | Status: SHIPPED | OUTPATIENT
Start: 2021-02-08 | End: 2021-05-24

## 2021-02-15 ENCOUNTER — ANESTHESIA EVENT (OUTPATIENT)
Dept: PERIOP | Facility: HOSPITAL | Age: 56
End: 2021-02-15

## 2021-02-15 ENCOUNTER — HOSPITAL ENCOUNTER (EMERGENCY)
Facility: HOSPITAL | Age: 56
Discharge: HOME | End: 2021-02-15
Attending: EMERGENCY MEDICINE

## 2021-02-15 ENCOUNTER — ANESTHESIA (OUTPATIENT)
Dept: PERIOP | Facility: HOSPITAL | Age: 56
End: 2021-02-15

## 2021-02-15 VITALS
SYSTOLIC BLOOD PRESSURE: 134 MMHG | BODY MASS INDEX: 37.22 KG/M2 | HEART RATE: 92 BPM | TEMPERATURE: 98.7 F | WEIGHT: 260 LBS | OXYGEN SATURATION: 94 % | RESPIRATION RATE: 16 BRPM | HEIGHT: 70 IN | DIASTOLIC BLOOD PRESSURE: 88 MMHG

## 2021-02-15 DIAGNOSIS — T18.128S FOOD IMPACTION OF ESOPHAGUS, SEQUELA: ICD-10-CM

## 2021-02-15 DIAGNOSIS — K22.2 ESOPHAGEAL OBSTRUCTION DUE TO FOOD IMPACTION: ICD-10-CM

## 2021-02-15 DIAGNOSIS — E11.9 TYPE 2 DIABETES MELLITUS WITHOUT COMPLICATION, WITHOUT LONG-TERM CURRENT USE OF INSULIN (HCC): ICD-10-CM

## 2021-02-15 DIAGNOSIS — T18.128A ESOPHAGEAL OBSTRUCTION DUE TO FOOD IMPACTION: ICD-10-CM

## 2021-02-15 DIAGNOSIS — R13.19 ESOPHAGEAL DYSPHAGIA: Primary | ICD-10-CM

## 2021-02-15 LAB
ALBUMIN SERPL-MCNC: 4.6 G/DL (ref 3.5–5.2)
ALBUMIN/GLOB SERPL: 1.8 G/DL
ALP SERPL-CCNC: 48 U/L (ref 39–117)
ALT SERPL W P-5'-P-CCNC: 19 U/L (ref 1–41)
ANION GAP SERPL CALCULATED.3IONS-SCNC: 16.9 MMOL/L (ref 5–15)
APTT PPP: 25.7 SECONDS (ref 22.7–35.4)
AST SERPL-CCNC: 20 U/L (ref 1–40)
BASOPHILS # BLD AUTO: 0.07 10*3/MM3 (ref 0–0.2)
BASOPHILS NFR BLD AUTO: 0.6 % (ref 0–1.5)
BILIRUB SERPL-MCNC: 0.7 MG/DL (ref 0–1.2)
BUN SERPL-MCNC: 20 MG/DL (ref 6–20)
BUN/CREAT SERPL: 21.3 (ref 7–25)
CALCIUM SPEC-SCNC: 10.2 MG/DL (ref 8.6–10.5)
CHLORIDE SERPL-SCNC: 95 MMOL/L (ref 98–107)
CO2 SERPL-SCNC: 22.1 MMOL/L (ref 22–29)
CREAT SERPL-MCNC: 0.94 MG/DL (ref 0.76–1.27)
DEPRECATED RDW RBC AUTO: 42.1 FL (ref 37–54)
EOSINOPHIL # BLD AUTO: 0.37 10*3/MM3 (ref 0–0.4)
EOSINOPHIL NFR BLD AUTO: 2.9 % (ref 0.3–6.2)
ERYTHROCYTE [DISTWIDTH] IN BLOOD BY AUTOMATED COUNT: 13.2 % (ref 12.3–15.4)
GFR SERPL CREATININE-BSD FRML MDRD: 84 ML/MIN/1.73
GLOBULIN UR ELPH-MCNC: 2.5 GM/DL
GLUCOSE BLDC GLUCOMTR-MCNC: 151 MG/DL (ref 70–130)
GLUCOSE SERPL-MCNC: 184 MG/DL (ref 65–99)
HCT VFR BLD AUTO: 48.4 % (ref 37.5–51)
HGB BLD-MCNC: 16 G/DL (ref 13–17.7)
IMM GRANULOCYTES # BLD AUTO: 0.08 10*3/MM3 (ref 0–0.05)
IMM GRANULOCYTES NFR BLD AUTO: 0.6 % (ref 0–0.5)
INR PPP: 0.93 (ref 0.9–1.1)
LYMPHOCYTES # BLD AUTO: 2.76 10*3/MM3 (ref 0.7–3.1)
LYMPHOCYTES NFR BLD AUTO: 21.8 % (ref 19.6–45.3)
MCH RBC QN AUTO: 28.9 PG (ref 26.6–33)
MCHC RBC AUTO-ENTMCNC: 33.1 G/DL (ref 31.5–35.7)
MCV RBC AUTO: 87.4 FL (ref 79–97)
MONOCYTES # BLD AUTO: 0.92 10*3/MM3 (ref 0.1–0.9)
MONOCYTES NFR BLD AUTO: 7.3 % (ref 5–12)
NEUTROPHILS NFR BLD AUTO: 66.8 % (ref 42.7–76)
NEUTROPHILS NFR BLD AUTO: 8.44 10*3/MM3 (ref 1.7–7)
NRBC BLD AUTO-RTO: 0 /100 WBC (ref 0–0.2)
PLATELET # BLD AUTO: 279 10*3/MM3 (ref 140–450)
PMV BLD AUTO: 9 FL (ref 6–12)
POTASSIUM SERPL-SCNC: 4.4 MMOL/L (ref 3.5–5.2)
PROT SERPL-MCNC: 7.1 G/DL (ref 6–8.5)
PROTHROMBIN TIME: 12.3 SECONDS (ref 11.7–14.2)
RBC # BLD AUTO: 5.54 10*6/MM3 (ref 4.14–5.8)
SARS-COV-2 N GENE NPH QL NAA+PROBE: NOT DETECTED
SODIUM SERPL-SCNC: 134 MMOL/L (ref 136–145)
WBC # BLD AUTO: 12.64 10*3/MM3 (ref 3.4–10.8)

## 2021-02-15 PROCEDURE — 25010000002 PROPOFOL 10 MG/ML EMULSION: Performed by: ANESTHESIOLOGY

## 2021-02-15 PROCEDURE — 99284 EMERGENCY DEPT VISIT MOD MDM: CPT

## 2021-02-15 PROCEDURE — 82962 GLUCOSE BLOOD TEST: CPT

## 2021-02-15 PROCEDURE — 85730 THROMBOPLASTIN TIME PARTIAL: CPT | Performed by: PHYSICIAN ASSISTANT

## 2021-02-15 PROCEDURE — 25010000002 DEXAMETHASONE PER 1 MG: Performed by: ANESTHESIOLOGY

## 2021-02-15 PROCEDURE — 85610 PROTHROMBIN TIME: CPT | Performed by: PHYSICIAN ASSISTANT

## 2021-02-15 PROCEDURE — 88305 TISSUE EXAM BY PATHOLOGIST: CPT | Performed by: INTERNAL MEDICINE

## 2021-02-15 PROCEDURE — U0003 INFECTIOUS AGENT DETECTION BY NUCLEIC ACID (DNA OR RNA); SEVERE ACUTE RESPIRATORY SYNDROME CORONAVIRUS 2 (SARS-COV-2) (CORONAVIRUS DISEASE [COVID-19]), AMPLIFIED PROBE TECHNIQUE, MAKING USE OF HIGH THROUGHPUT TECHNOLOGIES AS DESCRIBED BY CMS-2020-01-R: HCPCS | Performed by: EMERGENCY MEDICINE

## 2021-02-15 PROCEDURE — 43239 EGD BIOPSY SINGLE/MULTIPLE: CPT | Performed by: INTERNAL MEDICINE

## 2021-02-15 PROCEDURE — 43247 EGD REMOVE FOREIGN BODY: CPT | Performed by: INTERNAL MEDICINE

## 2021-02-15 PROCEDURE — 80053 COMPREHEN METABOLIC PANEL: CPT | Performed by: PHYSICIAN ASSISTANT

## 2021-02-15 PROCEDURE — 25010000002 SUCCINYLCHOLINE PER 20 MG: Performed by: ANESTHESIOLOGY

## 2021-02-15 PROCEDURE — C9803 HOPD COVID-19 SPEC COLLECT: HCPCS

## 2021-02-15 PROCEDURE — 85025 COMPLETE CBC W/AUTO DIFF WBC: CPT | Performed by: PHYSICIAN ASSISTANT

## 2021-02-15 RX ORDER — HYDRALAZINE HYDROCHLORIDE 20 MG/ML
10 INJECTION INTRAMUSCULAR; INTRAVENOUS
Status: CANCELLED | OUTPATIENT
Start: 2021-02-15

## 2021-02-15 RX ORDER — SODIUM CHLORIDE, SODIUM LACTATE, POTASSIUM CHLORIDE, CALCIUM CHLORIDE 600; 310; 30; 20 MG/100ML; MG/100ML; MG/100ML; MG/100ML
9 INJECTION, SOLUTION INTRAVENOUS CONTINUOUS
Status: DISCONTINUED | OUTPATIENT
Start: 2021-02-15 | End: 2021-02-15 | Stop reason: HOSPADM

## 2021-02-15 RX ORDER — FAMOTIDINE 10 MG/ML
20 INJECTION, SOLUTION INTRAVENOUS ONCE
Status: COMPLETED | OUTPATIENT
Start: 2021-02-15 | End: 2021-02-15

## 2021-02-15 RX ORDER — EPHEDRINE SULFATE 50 MG/ML
5 INJECTION, SOLUTION INTRAVENOUS ONCE AS NEEDED
Status: CANCELLED | OUTPATIENT
Start: 2021-02-15

## 2021-02-15 RX ORDER — PROPOFOL 10 MG/ML
VIAL (ML) INTRAVENOUS AS NEEDED
Status: DISCONTINUED | OUTPATIENT
Start: 2021-02-15 | End: 2021-02-15 | Stop reason: SURG

## 2021-02-15 RX ORDER — ONDANSETRON 2 MG/ML
4 INJECTION INTRAMUSCULAR; INTRAVENOUS ONCE AS NEEDED
Status: CANCELLED | OUTPATIENT
Start: 2021-02-15

## 2021-02-15 RX ORDER — NALOXONE HCL 0.4 MG/ML
0.4 VIAL (ML) INJECTION AS NEEDED
Status: CANCELLED | OUTPATIENT
Start: 2021-02-15

## 2021-02-15 RX ORDER — SODIUM CHLORIDE 0.9 % (FLUSH) 0.9 %
3 SYRINGE (ML) INJECTION EVERY 12 HOURS SCHEDULED
Status: DISCONTINUED | OUTPATIENT
Start: 2021-02-15 | End: 2021-02-15 | Stop reason: HOSPADM

## 2021-02-15 RX ORDER — LIDOCAINE HYDROCHLORIDE 20 MG/ML
INJECTION, SOLUTION INFILTRATION; PERINEURAL AS NEEDED
Status: DISCONTINUED | OUTPATIENT
Start: 2021-02-15 | End: 2021-02-15 | Stop reason: SURG

## 2021-02-15 RX ORDER — DEXAMETHASONE SODIUM PHOSPHATE 10 MG/ML
INJECTION INTRAMUSCULAR; INTRAVENOUS AS NEEDED
Status: DISCONTINUED | OUTPATIENT
Start: 2021-02-15 | End: 2021-02-15 | Stop reason: SURG

## 2021-02-15 RX ORDER — LABETALOL HYDROCHLORIDE 5 MG/ML
5 INJECTION, SOLUTION INTRAVENOUS
Status: CANCELLED | OUTPATIENT
Start: 2021-02-15

## 2021-02-15 RX ORDER — DIPHENHYDRAMINE HYDROCHLORIDE 50 MG/ML
6.25 INJECTION INTRAMUSCULAR; INTRAVENOUS
Status: CANCELLED | OUTPATIENT
Start: 2021-02-15

## 2021-02-15 RX ORDER — SUCCINYLCHOLINE CHLORIDE 20 MG/ML
INJECTION INTRAMUSCULAR; INTRAVENOUS AS NEEDED
Status: DISCONTINUED | OUTPATIENT
Start: 2021-02-15 | End: 2021-02-15 | Stop reason: SURG

## 2021-02-15 RX ORDER — OMEPRAZOLE 20 MG/1
40 CAPSULE, DELAYED RELEASE ORAL DAILY
Qty: 90 CAPSULE | Refills: 3 | Status: SHIPPED | OUTPATIENT
Start: 2021-02-15 | End: 2021-08-12

## 2021-02-15 RX ORDER — FENTANYL CITRATE 50 UG/ML
25 INJECTION, SOLUTION INTRAMUSCULAR; INTRAVENOUS
Status: CANCELLED | OUTPATIENT
Start: 2021-02-15

## 2021-02-15 RX ORDER — SODIUM CHLORIDE 0.9 % (FLUSH) 0.9 %
3-10 SYRINGE (ML) INJECTION AS NEEDED
Status: DISCONTINUED | OUTPATIENT
Start: 2021-02-15 | End: 2021-02-15 | Stop reason: HOSPADM

## 2021-02-15 RX ADMIN — SUCCINYLCHOLINE CHLORIDE 160 MG: 20 INJECTION, SOLUTION INTRAMUSCULAR; INTRAVENOUS; PARENTERAL at 03:44

## 2021-02-15 RX ADMIN — SODIUM CHLORIDE, POTASSIUM CHLORIDE, SODIUM LACTATE AND CALCIUM CHLORIDE: 600; 310; 30; 20 INJECTION, SOLUTION INTRAVENOUS at 03:41

## 2021-02-15 RX ADMIN — DEXAMETHASONE SODIUM PHOSPHATE 8 MG: 10 INJECTION INTRAMUSCULAR; INTRAVENOUS at 03:47

## 2021-02-15 RX ADMIN — FAMOTIDINE 20 MG: 10 INJECTION INTRAVENOUS at 03:35

## 2021-02-15 RX ADMIN — PROPOFOL 200 MG: 10 INJECTION, EMULSION INTRAVENOUS at 03:44

## 2021-02-15 RX ADMIN — LIDOCAINE HYDROCHLORIDE 80 MG: 20 INJECTION, SOLUTION INFILTRATION; PERINEURAL at 03:44

## 2021-02-15 NOTE — DISCHARGE INSTRUCTIONS
Follow up with a GI doctor in 2-3 weeks, Dr. Grant ([phone]) or a doctor of your choice.    Start taking Prilosec as before and continue until you follow up with your GI doctor.    Take a soft diet for the next couple of days.

## 2021-02-15 NOTE — ANESTHESIA PROCEDURE NOTES
Airway  Urgency: elective    Date/Time: 2/15/2021 3:46 AM  Airway not difficult    General Information and Staff    Patient location during procedure: OR  Anesthesiologist: José Luis Natarajan MD    Indications and Patient Condition  Indications for airway management: airway protection    Preoxygenated: yes  Mask difficulty assessment: 1 - vent by mask    Final Airway Details  Final airway type: endotracheal airway      Successful airway: ETT  Cuffed: yes   Successful intubation technique: direct laryngoscopy  Endotracheal tube insertion site: oral  Blade: Kervin  Blade size: 3  ETT size (mm): 7.5  Cormack-Lehane Classification: grade IIb - view of arytenoids or posterior of glottis only  Placement verified by: chest auscultation and capnometry   Measured from: lips  ETT/EBT  to lips (cm): 22  Number of attempts at approach: 1  Assessment: lips, teeth, and gum same as pre-op and atraumatic intubation

## 2021-02-15 NOTE — H&P
[facility]  Pre Procedure History & Physical    Chief Complaint:   Acute dysphagia    Subjective     HPI:   Patient 55-year-old male with history of hyperlipidemia, hypertension and diabetes presenting with acute food bolus.  Patient was eating pork chop and after first bite the food got stuck.  Patient has vomited several times but unable to dislodge the food bolus.  Patient initially presented to [facility] [place] transferred here for further care.  Patient currently continues unable to handle his own secretions.    Past Medical History:   Past Medical History:   Diagnosis Date   • Abnormal electrocardiogram 1/19/2016   • Diabetes mellitus (CMS/HCC)    • Eczema 1/19/2016   • Hyperlipidemia    • Hypertension        Past Surgical History:  Past Surgical History:   Procedure Laterality Date   • COLONOSCOPY     • LASIK         Family History:  Family History   Problem Relation Age of Onset   • Other Mother         rhythm disorder   • Diabetes Father    • Diabetes Brother    • Hyperlipidemia Brother    • Cancer Maternal Grandmother    • Alcohol abuse Maternal Grandfather    • Cancer Paternal Grandmother    • Diabetes Paternal Grandfather        Social History:   reports that he has never smoked. He has never used smokeless tobacco. He reports current alcohol use. He reports that he does not use drugs.    Medications:   Medications Prior to Admission   Medication Sig Dispense Refill Last Dose   • ACCU-CHEK COMPACT PLUS test strip TEST FAST BLOOD SUGAR D  3    • Janumet  MG per tablet TAKE 1 TABLET BY MOUTH TWICE DAILY 180 tablet 1    • Jardiance 25 MG tablet TAKE 1 TABLET BY MOUTH DAILY 30 tablet 5    • lisinopril (PRINIVIL,ZESTRIL) 10 MG tablet TAKE 1 TABLET BY MOUTH DAILY 90 tablet 1    • Omega-3 Fatty Acids (FISH OIL) 1000 MG capsule capsule Take 3 capsules by mouth daily.      • valACYclovir (VALTREX) 500 MG tablet TAKE 1 TABLET BY MOUTH TWICE DAILY FOR 3 DAYS 6 tablet 0    •  "albuterol sulfate  (90 Base) MCG/ACT inhaler Inhale 2 puffs 3 (Three) Times a Day. 1 inhaler 0    • fluticasone (FLONASE) 50 MCG/ACT nasal spray 2 sprays into the nostril(s) as directed by provider Daily. 1 bottle 0    • omeprazole (priLOSEC) 40 MG capsule Take 1 capsule by mouth Daily. 45 capsule 0        Allergies:  No known drug allergy    ROS:    Pertinent items are noted in HPI     Objective     Blood pressure 134/80, pulse 95, temperature 98.5 °F (36.9 °C), temperature source Oral, resp. rate 16, height 177.8 cm (70\"), weight 118 kg (260 lb), SpO2 93 %.    Physical Exam   Constitutional: Pt is oriented to person, place, and time and well-developed, well-nourished, and in no distress.   Mouth/Throat: Oropharynx is clear and moist.   Neck: Normal range of motion.   Cardiovascular: Normal rate, regular rhythm and normal heart sounds.    Pulmonary/Chest: Effort normal and breath sounds normal.   Abdominal: Soft. Nontender  Skin: Skin is warm and dry.   Psychiatric: Mood, memory, affect and judgment normal.     Assessment/Plan     Diagnosis:  Esophageal dysphagia  Diabetes  Hypertension  Hyperlipidemia    Anticipated Surgical Procedure:  Esophagogastroduodenoscopy    The risks, benefits, and alternatives of this procedure have been discussed with the patient or the responsible party- the patient understands and agrees to proceed.                                                          "

## 2021-02-15 NOTE — BRIEF OP NOTE
ESOPHAGOGASTRODUODENOSCOPY  Progress Note    Rashid Henderson  2/15/2021    Pre-op Diagnosis:   Dysphagia       Post-Op Diagnosis Codes:     * Esophageal foreign body [T18.108A]     * Esophagitis [530.1]     * Gastritis [K29.70]     * Duodenitis [K29.80]    Procedure/CPT® Codes:        Procedure(s):  ESOPHAGOGASTRODUODENOSCOPY WITH REMOVAL OF FOOD BOLUS    Surgeon(s):  Santo Grant MD    Anesthesia: Monitored Anesthesia Care    Staff:   Circulator: Kitty Dyer RN  Endo Nurse: Bindu Tapia RN; Milagros Estrada RN         Estimated Blood Loss: minimal    Urine Voided: * No values recorded between 2/15/2021  3:41 AM and 2/15/2021  4:01 AM *    Specimens:                Specimens     ID Source Type Tests Collected By Collected At Frozen?      A Gastric, Antrum Tissue · TISSUE PATHOLOGY EXAM   Santo Grant MD 2/15/21 0354      Description: antrum biopsy                Drains: * No LDAs found *    Findings: EGD revealed esophageal food residual removed with rat tooth forceps.  Acute distal esophagitis noted.  Diffuse gastritis apparent biopsies of the antrum were obtained.  Erosive duodenitis was also noted.  Normal retroflex of the GE junction was seen.    Complications: None          Santo Grant MD     Date: 2/15/2021  Time: 04:08 EST

## 2021-02-15 NOTE — ED PROVIDER NOTES
EMERGENCY DEPARTMENT ENCOUNTER    Room Number: 07/07  Date seen: 2/15/2021  Time seen: 00:47 EST  PCP: Nyla Al MD    Spoken Language:  English  Language interpretation services not needed     CHIEF COMPLAINT: food bolus    HPI: Rashid Henderson is a 55 y.o. male presenting to the ED for evaluation of a food bolus. The history is being obtained by the patient and by review of the medical chart.  The patient states that he cooked pork chops for dinner this evening and that at approximately 8 PM he took his first bite.  He states that he feels as though it got caught in his throat.  Since that time, he has been unable to swallow any food or liquid.  He is also been unable to swallow his secretions.  He was initially seen at [facility], but was then transferred to [facility] in order to be taken for an EGD with Dr. Grant.  While at [place], the patient was given 2 doses of glucagon and 2 doses of Zofran without relief.  He denies difficulty breathing.    MEDICAL RECORD REVIEW:  Reviewed in [employer].     PAST MEDICAL HISTORY  Past Medical History:   Diagnosis Date   • Abnormal electrocardiogram 1/19/2016   • Diabetes mellitus (CMS/HCC)    • Eczema 1/19/2016   • Hyperlipidemia    • Hypertension        PAST SURGICAL HISTORY  Past Surgical History:   Procedure Laterality Date   • COLONOSCOPY     • LASIK         FAMILY HISTORY  Family History   Problem Relation Age of Onset   • Other Mother         rhythm disorder   • Diabetes Father    • Diabetes Brother    • Hyperlipidemia Brother    • Cancer Maternal Grandmother    • Alcohol abuse Maternal Grandfather    • Cancer Paternal Grandmother    • Diabetes Paternal Grandfather        SOCIAL HISTORY  Social History     Socioeconomic History   • Marital status:      Spouse name: Not on file   • Number of children: Not on file   • Years of education: Not on file   • Highest education level: Not on file   Tobacco Use   • Smoking status: Never Smoker    • Smokeless tobacco: Never Used   Substance and Sexual Activity   • Alcohol use: Yes   • Drug use: No       CURRENT MEDICATIONS  Prior to Admission medications    Medication Sig Start Date End Date Taking? Authorizing Provider   ACCU-CHEK COMPACT PLUS test strip TEST FAST BLOOD SUGAR D 9/29/18   Provider, MD Francia   albuterol sulfate  (90 Base) MCG/ACT inhaler Inhale 2 puffs 3 (Three) Times a Day. 8/16/19   Ramon Man MD   fluticasone (FLONASE) 50 MCG/ACT nasal spray 2 sprays into the nostril(s) as directed by provider Daily. 8/16/19   Ramon Man MD   Janumet  MG per tablet TAKE 1 TABLET BY MOUTH TWICE DAILY 1/19/21   Alta Guillory APRN   Jardiance 25 MG tablet TAKE 1 TABLET BY MOUTH DAILY 2/1/21   Nyla Al MD   lisinopril (PRINIVIL,ZESTRIL) 10 MG tablet TAKE 1 TABLET BY MOUTH DAILY 11/30/20   Nyla Al MD   Omega-3 Fatty Acids (FISH OIL) 1000 MG capsule capsule Take 3 capsules by mouth daily. 8/27/13   Nyla Al MD   omeprazole (priLOSEC) 40 MG capsule Take 1 capsule by mouth Daily. 3/20/20   Nyla Al MD   valACYclovir (VALTREX) 500 MG tablet TAKE 1 TABLET BY MOUTH TWICE DAILY FOR 3 DAYS 2/8/21   Nyla Al MD       ALLERGIES  No known drug allergy    REVIEW OF SYSTEMS  All systems reviewed and negative except for those discussed in HPI.     PHYSICAL EXAM  ED Triage Vitals [02/15/21 0030]   Temp Heart Rate Resp BP SpO2   97.7 °F (36.5 °C) 110 16 -- 97 %      Temp src Heart Rate Source Patient Position BP Location FiO2 (%)   Tympanic Monitor -- -- --       Physical Exam  Constitutional:       Appearance: Normal appearance.   HENT:      Head: Normocephalic and atraumatic.      Mouth/Throat:      Mouth: Mucous membranes are moist.   Eyes:      Extraocular Movements: Extraocular movements intact.      Pupils: Pupils are equal, round, and reactive to light.   Neck:      Musculoskeletal: Normal range of motion.   Cardiovascular:       Rate and Rhythm: Normal rate and regular rhythm.   Pulmonary:      Effort: Pulmonary effort is normal. No respiratory distress.      Breath sounds: Normal breath sounds. No stridor.   Abdominal:      General: There is no distension.      Palpations: Abdomen is soft.      Tenderness: There is no abdominal tenderness.   Skin:     General: Skin is warm and dry.   Neurological:      General: No focal deficit present.      Mental Status: He is alert and oriented to person, place, and time.   Psychiatric:         Mood and Affect: Mood normal.         Behavior: Behavior normal.         Thought Content: Thought content normal.         Judgment: Judgment normal.         PROCEDURES  Procedures  None    LABS  Recent Results (from the past 24 hour(s))   Comprehensive Metabolic Panel    Collection Time: 02/15/21  1:30 AM    Specimen: Blood   Result Value Ref Range    Glucose 184 (H) 65 - 99 mg/dL    BUN 20 6 - 20 mg/dL    Creatinine 0.94 0.76 - 1.27 mg/dL    Sodium 134 (L) 136 - 145 mmol/L    Potassium 4.4 3.5 - 5.2 mmol/L    Chloride 95 (L) 98 - 107 mmol/L    CO2 22.1 22.0 - 29.0 mmol/L    Calcium 10.2 8.6 - 10.5 mg/dL    Total Protein 7.1 6.0 - 8.5 g/dL    Albumin 4.60 3.50 - 5.20 g/dL    ALT (SGPT) 19 1 - 41 U/L    AST (SGOT) 20 1 - 40 U/L    Alkaline Phosphatase 48 39 - 117 U/L    Total Bilirubin 0.7 0.0 - 1.2 mg/dL    eGFR Non African Amer 84 >60 mL/min/1.73    Globulin 2.5 gm/dL    A/G Ratio 1.8 g/dL    BUN/Creatinine Ratio 21.3 7.0 - 25.0    Anion Gap 16.9 (H) 5.0 - 15.0 mmol/L   aPTT    Collection Time: 02/15/21  1:30 AM    Specimen: Blood   Result Value Ref Range    PTT 25.7 22.7 - 35.4 seconds   Protime-INR    Collection Time: 02/15/21  1:30 AM    Specimen: Blood   Result Value Ref Range    Protime 12.3 11.7 - 14.2 Seconds    INR 0.93 0.90 - 1.10   CBC Auto Differential    Collection Time: 02/15/21  1:30 AM    Specimen: Blood   Result Value Ref Range    WBC 12.64 (H) 3.40 - 10.80 10*3/mm3    RBC 5.54 4.14 - 5.80  10*6/mm3    Hemoglobin 16.0 13.0 - 17.7 g/dL    Hematocrit 48.4 37.5 - 51.0 %    MCV 87.4 79.0 - 97.0 fL    MCH 28.9 26.6 - 33.0 pg    MCHC 33.1 31.5 - 35.7 g/dL    RDW 13.2 12.3 - 15.4 %    RDW-SD 42.1 37.0 - 54.0 fl    MPV 9.0 6.0 - 12.0 fL    Platelets 279 140 - 450 10*3/mm3    Neutrophil % 66.8 42.7 - 76.0 %    Lymphocyte % 21.8 19.6 - 45.3 %    Monocyte % 7.3 5.0 - 12.0 %    Eosinophil % 2.9 0.3 - 6.2 %    Basophil % 0.6 0.0 - 1.5 %    Immature Grans % 0.6 (H) 0.0 - 0.5 %    Neutrophils, Absolute 8.44 (H) 1.70 - 7.00 10*3/mm3    Lymphocytes, Absolute 2.76 0.70 - 3.10 10*3/mm3    Monocytes, Absolute 0.92 (H) 0.10 - 0.90 10*3/mm3    Eosinophils, Absolute 0.37 0.00 - 0.40 10*3/mm3    Basophils, Absolute 0.07 0.00 - 0.20 10*3/mm3    Immature Grans, Absolute 0.08 (H) 0.00 - 0.05 10*3/mm3    nRBC 0.0 0.0 - 0.2 /100 WBC       RADIOLOGY  No orders to display       MEDICATIONS GIVEN IN THE ER  Medications - No data to display    MEDICAL DECISION MAKING, CONSULTS AND PROGRESS NOTES  All labs and all radiology studies were have been viewed and interpreted by me.  Discussion below represents my analysis of pertinent findings related to patient's condition, differential diagnosis, treatment plan and final disposition.    PPE: The patient was placed in a face mask in first look. Patient was wearing facemask when I entered the room and throughout our encounter. I wore full protective equipment throughout this patient encounter including a face mask, eye shield and gloves. Hand hygiene was performed before donning protective equipment and after removal when leaving the room.    AS OF 03:06 EST VITALS:    BP - 109/68  HR - 110  TEMP - 97.7 °F (36.5 °C) (Tympanic)  O2 SATS - 94%    ED Course as of Feb 15 0306   Mon Feb 15, 2021   0115 I discussed the patient's care with Dr. Grant.  He will plan to take the patient to the endoscopy suite for an EGD.    [AR]      ED Course User Index  [AR] Marcie Weinberg PA        DIAGNOSIS   Diagnosis Plan   1. Esophageal dysphagia  Case Request    Case Request   2. Type 2 diabetes mellitus without complication, without long-term current use of insulin (CMS/[facility])  Case Request    Case Request   3. Esophageal obstruction due to food impaction         DISPOSITION  ED Disposition     ED Disposition Condition Comment    Send to OR          Provider Attestation:  I personally reviewed the past medical history, past surgical history, social history, family history, current medications and allergies as they appear in the chart. I reviewed the patient's history, physical, lab/imaging results and overall care with Dr. Becker who is in agreement with the patient's treatment plan.    EMR Dragon/Transcription disclaimer:  Some of this encounter note is an electronic transcription/translation of spoken language to printed text. The electronic translation of spoken language may permit erroneous, or at times, nonsensical words or phrases to be inadvertently transcribed; Although I have reviewed the note for such errors, some may still exist.    Provider note signed by:         Marcie Weinberg PA  02/15/21 0306

## 2021-02-15 NOTE — ANESTHESIA PREPROCEDURE EVALUATION
Anesthesia Evaluation     no history of anesthetic complications:  NPO Solid Status: Waived due to emergency  NPO Liquid Status: Waived due to emergency           Airway   Mallampati: I  Neck ROM: full  no difficulty expected  Dental - normal exam     Pulmonary - normal exam   (+) sleep apnea,   (-) COPD, asthma, not a smoker    PE comment: nonlabored  Cardiovascular - normal exam    Rhythm: regular  Rate: normal    (+) hypertension, hyperlipidemia,   (-) valvular problems/murmurs, past MI, CAD, dysrhythmias, angina      Neuro/Psych- negative ROS  (-) seizures, TIA, CVA  GI/Hepatic/Renal/Endo    (+) morbid obesity,  liver disease history of elevated LFT, diabetes mellitus type 2,   (-) GERD, no renal disease, no thyroid disorder    Musculoskeletal (-) negative ROS    Abdominal    Substance History      OB/GYN          Other                      Anesthesia Plan    ASA 3 - emergent     general     intravenous induction     Anesthetic plan, all risks, benefits, and alternatives have been provided, discussed and informed consent has been obtained with: patient.

## 2021-02-15 NOTE — ED NOTES
Pt coming via [facility] from [facility] with food bolus. Pt was accepted by Dr Arias Becker in the ER and GI Dr Grant, report form Yessica Teague RN. Pt has 20 in RH. Pt had 2 rounds of IV glucagon without relief. Pt has had zofran 4mg times 2 IV and has had carbonated beverage but unable to keep liquids down. Pt had pork chops at dinner 4 hours ago. Pt has hx of increased cholesterol, DM, and HTN. Pt's last set of vitals 97.7, 137/83, 110HR, RR 18, o2 97% on room air. Wife is an RN and is driving patient to ER.      Radha Becker RN  02/14/21 1654

## 2021-02-15 NOTE — ED PROVIDER NOTES
The GEMA and I have discussed this patients history, physical exam, and treatment plan. I have reviewed the documentation and personally had a face to face interaction with the patient. I affirm the documentation and agree with the treatment and plan.  The following note describes my personal findings    This patient is a 55-year-old male presenting to the emergency room tonight with a food bolus.  The patient states that he was eating pork loin tonight and since the event has not been able to tolerate any food nor liquid.    Exam: This patient is resting comfortably and in no distress, without gross neurological deficit.  He is afebrile with stable vital signs and nontoxic in appearance.  Cardiovascular exam shows a regular rate and rhythm without murmur.    Plan: The patient was seen at an outlying facility, transferred here, and consultation with gastroenterology has been obtained.  We will send the patient to endoscopy with Dr. Grant.      The patient was wearing a facemask upon entrance into the room and remained in such throughout their visit.  I was wearing PPE including a facemask, eye protection, as well as gloves at any point entering the room and throughout the visit     Arias Becker MD  02/15/21 0619

## 2021-02-15 NOTE — ANESTHESIA POSTPROCEDURE EVALUATION
"Patient: Rashid Henderson    Procedure Summary     Date: 02/15/21 Room / Location: [facility] OR  / [facility] MAIN OR    Anesthesia Start: 0341 Anesthesia Stop: 0405    Procedure: ESOPHAGOGASTRODUODENOSCOPY WITH REMOVAL OF FOOD BOLUS (N/A Esophagus) Diagnosis:     Surgeon: Santo Grant MD Provider: José Luis Natarajan MD    Anesthesia Type: general ASA Status: 3 - Emergent          Anesthesia Type: general    Vitals  Vitals Value Taken Time   /87 02/15/21 0403   Temp 37.1 °C (98.7 °F) 02/15/21 0403   Pulse 102 02/15/21 0403   Resp 16 02/15/21 0403   SpO2 97 % 02/15/21 0405   Vitals shown include unvalidated device data.        Post Anesthesia Care and Evaluation    Patient location during evaluation: bedside  Patient participation: complete - patient participated  Level of consciousness: awake  Pain management: adequate  Airway patency: patent  Anesthetic complications: No anesthetic complications    Cardiovascular status: acceptable  Respiratory status: acceptable  Hydration status: acceptable    Comments: */87 (BP Location: Left arm, Patient Position: Lying)   Pulse 102   Temp 37.1 °C (98.7 °F) (Oral)   Resp 16   Ht 177.8 cm (70\")   Wt 118 kg (260 lb)   SpO2 97%   BMI 37.31 kg/m²         "

## 2021-02-16 LAB
LAB AP CASE REPORT: NORMAL
PATH REPORT.FINAL DX SPEC: NORMAL
PATH REPORT.GROSS SPEC: NORMAL

## 2021-02-17 ENCOUNTER — TELEPHONE (OUTPATIENT)
Dept: FAMILY MEDICINE CLINIC | Facility: CLINIC | Age: 56
End: 2021-02-17

## 2021-02-17 DIAGNOSIS — E78.5 HYPERLIPIDEMIA, UNSPECIFIED HYPERLIPIDEMIA TYPE: Primary | ICD-10-CM

## 2021-02-17 DIAGNOSIS — E11.9 TYPE 2 DIABETES MELLITUS WITHOUT COMPLICATION, WITHOUT LONG-TERM CURRENT USE OF INSULIN: ICD-10-CM

## 2021-02-17 DIAGNOSIS — I10 ESSENTIAL HYPERTENSION: ICD-10-CM

## 2021-02-17 NOTE — TELEPHONE ENCOUNTER
Caller: Rashid Henderson    Relationship: Self    Best call back number: [phone]     What orders are you requesting (i.e. lab or imaging): FASTING LABS    In what timeframe would the patient need to come in: POSSIBLY THIS Friday, OR EARLY Monday DUE TO WEATHER.    Where will you receive your lab/imaging services: IN-OFFICE     Additional notes: PATIENT HAS A FOLLOW-UP APPOINTMENT ON 02/23/2021 THAT HE WANTS TO KEEP. PATIENT STATES THAT HE USUALLY HAS FASTING LABS BEFORE HIS FOLLOW-UP APPOINTMENTS. PATIENT DOES NOT HAVE ANYTHING SCHEDULED AS OF YET AND WOULD LIKE TO FOLLOW-UP WITH CLINICAL STAFF. FYI: PATIENT WAS ALSO RECENTLY RELEASED FROM THE ER AT [facility] ON 02/14/2021.

## 2021-02-17 NOTE — TELEPHONE ENCOUNTER
It also looks like he is follow up from a recent hospital stay on 2/15/21. Please advise your thoughts if you would like to do labs prior to along with his ed f/u and 4 mo f/u?

## 2021-02-18 DIAGNOSIS — I10 ESSENTIAL HYPERTENSION: ICD-10-CM

## 2021-02-18 DIAGNOSIS — E78.5 HYPERLIPIDEMIA, UNSPECIFIED HYPERLIPIDEMIA TYPE: ICD-10-CM

## 2021-02-18 DIAGNOSIS — E11.9 TYPE 2 DIABETES MELLITUS WITHOUT COMPLICATION, WITHOUT LONG-TERM CURRENT USE OF INSULIN (HCC): ICD-10-CM

## 2021-02-20 LAB
ALBUMIN SERPL-MCNC: 4.4 G/DL (ref 3.5–5.2)
ALBUMIN/GLOB SERPL: 1.7 G/DL
ALP SERPL-CCNC: 49 U/L (ref 39–117)
ALT SERPL-CCNC: 29 U/L (ref 1–41)
AST SERPL-CCNC: 24 U/L (ref 1–40)
BASOPHILS # BLD AUTO: 0.04 10*3/MM3 (ref 0–0.2)
BASOPHILS NFR BLD AUTO: 0.6 % (ref 0–1.5)
BILIRUB SERPL-MCNC: 0.5 MG/DL (ref 0–1.2)
BUN SERPL-MCNC: 18 MG/DL (ref 6–20)
BUN/CREAT SERPL: 18.9 (ref 7–25)
CALCIUM SERPL-MCNC: 9.3 MG/DL (ref 8.6–10.5)
CHLORIDE SERPL-SCNC: 98 MMOL/L (ref 98–107)
CHOLEST SERPL-MCNC: 181 MG/DL (ref 0–200)
CO2 SERPL-SCNC: 25.7 MMOL/L (ref 22–29)
CREAT SERPL-MCNC: 0.95 MG/DL (ref 0.76–1.27)
EOSINOPHIL # BLD AUTO: 0.31 10*3/MM3 (ref 0–0.4)
EOSINOPHIL NFR BLD AUTO: 4.6 % (ref 0.3–6.2)
ERYTHROCYTE [DISTWIDTH] IN BLOOD BY AUTOMATED COUNT: 12.8 % (ref 12.3–15.4)
GLOBULIN SER CALC-MCNC: 2.6 GM/DL
GLUCOSE SERPL-MCNC: 176 MG/DL (ref 65–99)
HBA1C MFR BLD: 8 % (ref 4.8–5.6)
HCT VFR BLD AUTO: 47.3 % (ref 37.5–51)
HDLC SERPL-MCNC: 89 MG/DL (ref 40–60)
HGB BLD-MCNC: 16.1 G/DL (ref 13–17.7)
IMM GRANULOCYTES # BLD AUTO: 0.04 10*3/MM3 (ref 0–0.05)
IMM GRANULOCYTES NFR BLD AUTO: 0.6 % (ref 0–0.5)
LDLC SERPL CALC-MCNC: 67 MG/DL (ref 0–100)
LDLC/HDLC SERPL: 0.69 {RATIO}
LYMPHOCYTES # BLD AUTO: 2.22 10*3/MM3 (ref 0.7–3.1)
LYMPHOCYTES NFR BLD AUTO: 32.9 % (ref 19.6–45.3)
MCH RBC QN AUTO: 30 PG (ref 26.6–33)
MCHC RBC AUTO-ENTMCNC: 34 G/DL (ref 31.5–35.7)
MCV RBC AUTO: 88.2 FL (ref 79–97)
MONOCYTES # BLD AUTO: 0.55 10*3/MM3 (ref 0.1–0.9)
MONOCYTES NFR BLD AUTO: 8.1 % (ref 5–12)
NEUTROPHILS # BLD AUTO: 3.59 10*3/MM3 (ref 1.7–7)
NEUTROPHILS NFR BLD AUTO: 53.2 % (ref 42.7–76)
NRBC BLD AUTO-RTO: 0 /100 WBC (ref 0–0.2)
PLATELET # BLD AUTO: 254 10*3/MM3 (ref 140–450)
POTASSIUM SERPL-SCNC: 4.7 MMOL/L (ref 3.5–5.2)
PROT SERPL-MCNC: 7 G/DL (ref 6–8.5)
RBC # BLD AUTO: 5.36 10*6/MM3 (ref 4.14–5.8)
SODIUM SERPL-SCNC: 134 MMOL/L (ref 136–145)
TRIGL SERPL-MCNC: 153 MG/DL (ref 0–150)
TSH SERPL DL<=0.005 MIU/L-ACNC: 4 UIU/ML (ref 0.27–4.2)
VLDLC SERPL CALC-MCNC: 25 MG/DL (ref 5–40)
WBC # BLD AUTO: 6.75 10*3/MM3 (ref 3.4–10.8)

## 2021-02-23 ENCOUNTER — OFFICE VISIT (OUTPATIENT)
Dept: FAMILY MEDICINE CLINIC | Facility: CLINIC | Age: 56
End: 2021-02-23

## 2021-02-23 VITALS
DIASTOLIC BLOOD PRESSURE: 68 MMHG | RESPIRATION RATE: 16 BRPM | TEMPERATURE: 97.7 F | SYSTOLIC BLOOD PRESSURE: 124 MMHG | BODY MASS INDEX: 37.44 KG/M2 | OXYGEN SATURATION: 97 % | HEART RATE: 96 BPM | HEIGHT: 70 IN | WEIGHT: 261.5 LBS

## 2021-02-23 DIAGNOSIS — E78.5 HYPERLIPIDEMIA, UNSPECIFIED HYPERLIPIDEMIA TYPE: Primary | ICD-10-CM

## 2021-02-23 DIAGNOSIS — R74.8 ELEVATED LIVER ENZYMES: ICD-10-CM

## 2021-02-23 DIAGNOSIS — I10 ESSENTIAL HYPERTENSION: ICD-10-CM

## 2021-02-23 DIAGNOSIS — E11.9 TYPE 2 DIABETES MELLITUS WITHOUT COMPLICATION, WITHOUT LONG-TERM CURRENT USE OF INSULIN (HCC): ICD-10-CM

## 2021-02-23 PROCEDURE — 99214 OFFICE O/P EST MOD 30 MIN: CPT | Performed by: INTERNAL MEDICINE

## 2021-02-23 RX ORDER — METFORMIN HYDROCHLORIDE 500 MG/1
TABLET, EXTENDED RELEASE ORAL
Qty: 360 TABLET | Refills: 1 | Status: SHIPPED | OUTPATIENT
Start: 2021-02-23 | End: 2021-09-08

## 2021-02-23 NOTE — PROGRESS NOTES
"Chief Complaint  Follow-up ( hld)    Subjective          Rashid Henderson presents to [facility]  History of Present Illness  Here for f/u on NIDDM, HL and HTN.  Recently dx with [facility] when he meat bolus get stuck in esophagus requiriing emergent EGD with Dr. Vu.  Has f/u in 1 week with him.  Esophagus feels better on omeprazole 20 mg qd   Not following diet for NIDDM.  No weight loss. No exercising.   Sugars are trending higher despite janumet 50/1000 2/day and jardiance 25 mg qd.       Objective   Vital Signs:   /68   Pulse 96   Temp 97.7 °F (36.5 °C) (Temporal)   Resp 16   Ht 177.8 cm (70\")   Wt 119 kg (261 lb 8 oz)   SpO2 97%   BMI 37.52 kg/m²     Physical Exam  Vitals signs and nursing note reviewed.   Constitutional:       Appearance: Normal appearance. He is well-developed.   HENT:      Head: Normocephalic and atraumatic.      Right Ear: External ear normal.      Left Ear: External ear normal.   Eyes:      Conjunctiva/sclera: Conjunctivae normal.   Neck:      Musculoskeletal: Neck supple.      Vascular: No carotid bruit.   Cardiovascular:      Rate and Rhythm: Normal rate and regular rhythm.      Heart sounds: Normal heart sounds.      Comments: No bruits  Pulmonary:      Effort: Pulmonary effort is normal. No respiratory distress.      Breath sounds: Normal breath sounds. No wheezing or rales.   Abdominal:      General: Bowel sounds are normal. There is no distension.      Palpations: Abdomen is soft. There is no mass.      Tenderness: There is no abdominal tenderness.   Lymphadenopathy:      Cervical: No cervical adenopathy.   Skin:     General: Skin is warm.   Neurological:      Mental Status: He is alert and oriented to person, place, and time.   Psychiatric:         Mood and Affect: Mood normal.         Behavior: Behavior normal.         Thought Content: Thought content normal.         Judgment: Judgment normal.        Result Review :                 Assessment " and Plan    Diagnoses and all orders for this visit:    1. Hyperlipidemia, unspecified hyperlipidemia type (Primary)    2. Essential hypertension    3. Type 2 diabetes mellitus without complication, without long-term current use of insulin (CMS/HCC)    4. Elevated liver enzymes    Other orders  -     metFORMIN ER (GLUCOPHAGE-XR) 500 MG 24 hr tablet; Take 2 tablets in the am with breakfast and 2 tabs with dinner  Dispense: 360 tablet; Refill: 1  -     Liraglutide (VICTOZA) 18 MG/3ML solution pen-injector injection; Inject 0.6 mg under the skin into the appropriate area as directed Daily.  Dispense: 3 mL; Refill: 1        Follow Up   No follow-ups on file.  Patient was given instructions and counseling regarding his condition or for health maintenance advice. Please see specific information pulled into the AVS if appropriate.     Poor dietary and exercise compliance with increasing sugars and increasing A1c.  I have encouraged him to work on lifestyle changes.  We also stopped the Janumet and I changed him to straight metformin  mg tablets 2 tablets twice a day and have started Victoza 0.6 mg under the skin once daily.  He will continue the Jardiance 25 mg daily.  I will see him back in 3 to 4 months to reassess his labs and see if we may progress with blood sugar control and hopefully some weight loss.  I did discuss side effects of Victoza with him and actually reviewed the data online with him to show him potential side effects and concerns that he could have with this medication.  He has no family history of thyroid cancer and no personal or family history of pancreatitis.  Liver enzyme elevation has resolved.  Blood pressure well controlled cholesterol controlled without the need for statin.  Follow-up with Dr. Grant in a week and I did review pictures with him of his endoscopy.  He will continue to take the omeprazole 20 mg daily which is working well for him.  He will review with Dr. Grant the biopsy  results that were obtained at the time of his endoscopy.

## 2021-03-02 ENCOUNTER — TELEPHONE (OUTPATIENT)
Dept: FAMILY MEDICINE CLINIC | Facility: CLINIC | Age: 56
End: 2021-03-02

## 2021-03-02 NOTE — TELEPHONE ENCOUNTER
Patient called and the pharmacy needs a a script for the needles that go with Liraglutide (VICTOZA) 18 MG/3ML solution pen-injector injection.     [facility] send to:  [id number] - [place], [place] - [address] - [phone]  - [phone]   [phone]

## 2021-03-04 ENCOUNTER — OFFICE VISIT (OUTPATIENT)
Dept: GASTROENTEROLOGY | Facility: CLINIC | Age: 56
End: 2021-03-04

## 2021-03-04 VITALS — WEIGHT: 256.4 LBS | BODY MASS INDEX: 36.71 KG/M2 | HEIGHT: 70 IN | TEMPERATURE: 98.6 F

## 2021-03-04 DIAGNOSIS — K22.2 ESOPHAGEAL STRICTURE: Primary | ICD-10-CM

## 2021-03-04 PROCEDURE — 99212 OFFICE O/P EST SF 10 MIN: CPT | Performed by: INTERNAL MEDICINE

## 2021-03-04 RX ORDER — SITAGLIPTIN AND METFORMIN HYDROCHLORIDE 1000; 50 MG/1; MG/1
1 TABLET, FILM COATED ORAL 2 TIMES DAILY WITH MEALS
COMMUNITY
End: 2021-03-23

## 2021-03-04 NOTE — TELEPHONE ENCOUNTER
I have looked in multiple sources for the size of needles to be used with Victoza and I cannot find it.  I even looked in our sample closet.  The only thing I know to do is to call the drug rep that details Victoza and see if they can give us the size of the needle or maybe call the pharmacy itself and find out.

## 2021-03-04 NOTE — PROGRESS NOTES
Chief Complaint   Patient presents with   • Follow-up       Rashid Henderson is a  55 y.o. male here for a follow up visit for esophageal stricture.    HPI     Patient 55-year-old male with history of hypertension, hyperlipidemia and diabetes who presented last month with acute dysphagia.  Patient seen in the ER with food bolus underwent endoscopy for removal.  Distal endoscopic evaluation of the esophagus confirmed esophageal stenosis now here for follow-up.  Patient reports since endoscopy has been taking PPI daily without significant symptoms.    Past Medical History:   Diagnosis Date   • Abnormal electrocardiogram 1/19/2016   • Diabetes mellitus (CMS/HCC)    • Eczema 1/19/2016   • Hyperlipidemia    • Hypertension          Current Outpatient Medications:   •  ACCU-CHEK COMPACT PLUS test strip, TEST FAST BLOOD SUGAR D, Disp: , Rfl: 3  •  albuterol sulfate  (90 Base) MCG/ACT inhaler, Inhale 2 puffs 3 (Three) Times a Day., Disp: 1 inhaler, Rfl: 0  •  fluticasone (FLONASE) 50 MCG/ACT nasal spray, 2 sprays into the nostril(s) as directed by provider Daily., Disp: 1 bottle, Rfl: 0  •  Jardiance 25 MG tablet, TAKE 1 TABLET BY MOUTH DAILY, Disp: 30 tablet, Rfl: 5  •  lisinopril (PRINIVIL,ZESTRIL) 10 MG tablet, TAKE 1 TABLET BY MOUTH DAILY, Disp: 90 tablet, Rfl: 1  •  Omega-3 Fatty Acids (FISH OIL) 1000 MG capsule capsule, Take 3 capsules by mouth daily., Disp: , Rfl:   •  omeprazole (priLOSEC) 20 MG capsule, Take 2 capsules by mouth Daily., Disp: 90 capsule, Rfl: 3  •  sitaGLIPtin-metFORMIN (Janumet)  MG per tablet, Take 1 tablet by mouth 2 (Two) Times a Day With Meals., Disp: , Rfl:   •  valACYclovir (VALTREX) 500 MG tablet, TAKE 1 TABLET BY MOUTH TWICE DAILY FOR 3 DAYS (Patient taking differently: As Needed.), Disp: 6 tablet, Rfl: 0  •  Liraglutide (VICTOZA) 18 MG/3ML solution pen-injector injection, Inject 0.6 mg under the skin into the appropriate area as directed Daily., Disp: 3 mL, Rfl: 1  •  metFORMIN ER  (GLUCOPHAGE-XR) 500 MG 24 hr tablet, Take 2 tablets in the am with breakfast and 2 tabs with dinner, Disp: 360 tablet, Rfl: 1    Allergies   Allergen Reactions   • No Known Drug Allergy        Social History     Socioeconomic History   • Marital status:      Spouse name: Not on file   • Number of children: Not on file   • Years of education: Not on file   • Highest education level: Not on file   Tobacco Use   • Smoking status: Never Smoker   • Smokeless tobacco: Never Used   Substance and Sexual Activity   • Alcohol use: Yes   • Drug use: No       Family History   Problem Relation Age of Onset   • Other Mother         rhythm disorder   • Diabetes Father    • Diabetes Brother    • Hyperlipidemia Brother    • Cancer Maternal Grandmother    • Alcohol abuse Maternal Grandfather    • Cancer Paternal Grandmother    • Diabetes Paternal Grandfather        Review of Systems   Constitutional: Negative.    HENT: Negative for sore throat, tinnitus, trouble swallowing and voice change.    Respiratory: Negative.    Cardiovascular: Negative.    Gastrointestinal: Negative.    Musculoskeletal: Negative.    Skin: Negative.    Hematological: Negative.        Vitals:    03/04/21 1028   Temp: 98.6 °F (37 °C)       Physical Exam  Vitals signs reviewed.   Constitutional:       Appearance: He is well-developed.   HENT:      Head: Normocephalic and atraumatic.   Eyes:      General: No scleral icterus.     Pupils: Pupils are equal, round, and reactive to light.   Cardiovascular:      Rate and Rhythm: Normal rate and regular rhythm.      Heart sounds: Normal heart sounds.   Pulmonary:      Effort: Pulmonary effort is normal.      Breath sounds: Normal breath sounds. No wheezing or rales.   Abdominal:      General: Bowel sounds are normal. There is no distension.      Palpations: Abdomen is soft. There is no mass.      Tenderness: There is no abdominal tenderness.      Hernia: No hernia is present.   Skin:     General: Skin is warm and  dry.      Coloration: Skin is not jaundiced.      Findings: No rash.   Neurological:      General: No focal deficit present.      Mental Status: He is alert and oriented to person, place, and time.   Psychiatric:         Behavior: Behavior normal.         Thought Content: Thought content normal.         Orders Only on 02/18/2021   Component Date Value Ref Range Status   • Hemoglobin A1C 02/19/2021 8.00* 4.80 - 5.60 % Final   • TSH 02/19/2021 4.000  0.270 - 4.200 uIU/mL Final   • Total Cholesterol 02/19/2021 181  0 - 200 mg/dL Final   • Triglycerides 02/19/2021 153* 0 - 150 mg/dL Final   • HDL Cholesterol 02/19/2021 89* 40 - 60 mg/dL Final   • VLDL Cholesterol [place] 02/19/2021 25  5 - 40 mg/dL Final   • LDL Chol Calc ([facility]) 02/19/2021 67  0 - 100 mg/dL Final   • LDL/HDL RATIO 02/19/2021 0.69   Final   • Glucose 02/19/2021 176* 65 - 99 mg/dL Final   • BUN 02/19/2021 18  6 - 20 mg/dL Final   • Creatinine 02/19/2021 0.95  0.76 - 1.27 mg/dL Final   • eGFR Non  Am 02/19/2021 83  >60 mL/min/1.73 Final   • eGFR African Am 02/19/2021 101  >60 mL/min/1.73 Final   • BUN/Creatinine Ratio 02/19/2021 18.9  7.0 - 25.0 Final   • Sodium 02/19/2021 134* 136 - 145 mmol/L Final   • Potassium 02/19/2021 4.7  3.5 - 5.2 mmol/L Final   • Chloride 02/19/2021 98  98 - 107 mmol/L Final   • Total CO2 02/19/2021 25.7  22.0 - 29.0 mmol/L Final   • Calcium 02/19/2021 9.3  8.6 - 10.5 mg/dL Final   • Total Protein 02/19/2021 7.0  6.0 - 8.5 g/dL Final   • Albumin 02/19/2021 4.40  3.50 - 5.20 g/dL Final   • Globulin 02/19/2021 2.6  gm/dL Final   • A/G Ratio 02/19/2021 1.7  g/dL Final   • Total Bilirubin 02/19/2021 0.5  0.0 - 1.2 mg/dL Final   • Alkaline Phosphatase 02/19/2021 49  39 - 117 U/L Final   • AST (SGOT) 02/19/2021 24  1 - 40 U/L Final   • ALT (SGPT) 02/19/2021 29  1 - 41 U/L Final   • WBC 02/19/2021 6.75  3.40 - 10.80 10*3/mm3 Final   • RBC 02/19/2021 5.36  4.14 - 5.80 10*6/mm3 Final   • Hemoglobin 02/19/2021 16.1  13.0 - 17.7 g/dL  Final   • Hematocrit 02/19/2021 47.3  37.5 - 51.0 % Final   • MCV 02/19/2021 88.2  79.0 - 97.0 fL Final   • MCH 02/19/2021 30.0  26.6 - 33.0 pg Final   • MCHC 02/19/2021 34.0  31.5 - 35.7 g/dL Final   • RDW 02/19/2021 12.8  12.3 - 15.4 % Final   • Platelets 02/19/2021 254  140 - 450 10*3/mm3 Final   • Neutrophil Rel % 02/19/2021 53.2  42.7 - 76.0 % Final   • Lymphocyte Rel % 02/19/2021 32.9  19.6 - 45.3 % Final   • Monocyte Rel % 02/19/2021 8.1  5.0 - 12.0 % Final   • Eosinophil Rel % 02/19/2021 4.6  0.3 - 6.2 % Final   • Basophil Rel % 02/19/2021 0.6  0.0 - 1.5 % Final   • Neutrophils Absolute 02/19/2021 3.59  1.70 - 7.00 10*3/mm3 Final   • Lymphocytes Absolute 02/19/2021 2.22  0.70 - 3.10 10*3/mm3 Final   • Monocytes Absolute 02/19/2021 0.55  0.10 - 0.90 10*3/mm3 Final   • Eosinophils Absolute 02/19/2021 0.31  0.00 - 0.40 10*3/mm3 Final   • Basophils Absolute 02/19/2021 0.04  0.00 - 0.20 10*3/mm3 Final   • Immature Granulocyte Rel % 02/19/2021 0.6* 0.0 - 0.5 % Final   • Immature Grans Absolute 02/19/2021 0.04  0.00 - 0.05 10*3/mm3 Final   • nRBC 02/19/2021 0.0  0.0 - 0.2 /100 WBC Final   Admission on 02/15/2021, Discharged on 02/15/2021   Component Date Value Ref Range Status   • Glucose 02/15/2021 184* 65 - 99 mg/dL Final   • BUN 02/15/2021 20  6 - 20 mg/dL Final   • Creatinine 02/15/2021 0.94  0.76 - 1.27 mg/dL Final   • Sodium 02/15/2021 134* 136 - 145 mmol/L Final   • Potassium 02/15/2021 4.4  3.5 - 5.2 mmol/L Final   • Chloride 02/15/2021 95* 98 - 107 mmol/L Final   • CO2 02/15/2021 22.1  22.0 - 29.0 mmol/L Final   • Calcium 02/15/2021 10.2  8.6 - 10.5 mg/dL Final   • Total Protein 02/15/2021 7.1  6.0 - 8.5 g/dL Final   • Albumin 02/15/2021 4.60  3.50 - 5.20 g/dL Final   • ALT (SGPT) 02/15/2021 19  1 - 41 U/L Final   • AST (SGOT) 02/15/2021 20  1 - 40 U/L Final   • Alkaline Phosphatase 02/15/2021 48  39 - 117 U/L Final   • Total Bilirubin 02/15/2021 0.7  0.0 - 1.2 mg/dL Final   • eGFR Non African Amer  02/15/2021 84  >60 mL/min/1.73 Final   • Globulin 02/15/2021 2.5  gm/dL Final   • A/G Ratio 02/15/2021 1.8  g/dL Final   • BUN/Creatinine Ratio 02/15/2021 21.3  7.0 - 25.0 Final   • Anion Gap 02/15/2021 16.9* 5.0 - 15.0 mmol/L Final   • PTT 02/15/2021 25.7  22.7 - 35.4 seconds Final   • Protime 02/15/2021 12.3  11.7 - 14.2 Seconds Final   • INR 02/15/2021 0.93  0.90 - 1.10 Final   • WBC 02/15/2021 12.64* 3.40 - 10.80 10*3/mm3 Final   • RBC 02/15/2021 5.54  4.14 - 5.80 10*6/mm3 Final   • Hemoglobin 02/15/2021 16.0  13.0 - 17.7 g/dL Final   • Hematocrit 02/15/2021 48.4  37.5 - 51.0 % Final   • MCV 02/15/2021 87.4  79.0 - 97.0 fL Final   • MCH 02/15/2021 28.9  26.6 - 33.0 pg Final   • MCHC 02/15/2021 33.1  31.5 - 35.7 g/dL Final   • RDW 02/15/2021 13.2  12.3 - 15.4 % Final   • RDW-SD 02/15/2021 42.1  37.0 - 54.0 fl Final   • MPV 02/15/2021 9.0  6.0 - 12.0 fL Final   • Platelets 02/15/2021 279  140 - 450 10*3/mm3 Final   • Neutrophil % 02/15/2021 66.8  42.7 - 76.0 % Final   • Lymphocyte % 02/15/2021 21.8  19.6 - 45.3 % Final   • Monocyte % 02/15/2021 7.3  5.0 - 12.0 % Final   • Eosinophil % 02/15/2021 2.9  0.3 - 6.2 % Final   • Basophil % 02/15/2021 0.6  0.0 - 1.5 % Final   • Immature Grans % 02/15/2021 0.6* 0.0 - 0.5 % Final   • Neutrophils, Absolute 02/15/2021 8.44* 1.70 - 7.00 10*3/mm3 Final   • Lymphocytes, Absolute 02/15/2021 2.76  0.70 - 3.10 10*3/mm3 Final   • Monocytes, Absolute 02/15/2021 0.92* 0.10 - 0.90 10*3/mm3 Final   • Eosinophils, Absolute 02/15/2021 0.37  0.00 - 0.40 10*3/mm3 Final   • Basophils, Absolute 02/15/2021 0.07  0.00 - 0.20 10*3/mm3 Final   • Immature Grans, Absolute 02/15/2021 0.08* 0.00 - 0.05 10*3/mm3 Final   • nRBC 02/15/2021 0.0  0.0 - 0.2 /100 WBC Final   • COVID19 02/15/2021 Not Detected  Not Detected - Ref. Range Final   • Glucose 02/15/2021 151* 70 - 130 mg/dL Final   • Case Report 02/15/2021    Final                    Value:Surgical Pathology Report                         Case:  [id number]                                  Authorizing Provider:  Santo Grant MD    Collected:           02/15/2021 03:54 AM          Ordering Location:     [facility]  Received:            02/15/2021 08:35 AM                                 MAIN OR                                                                      Pathologist:           Alta Montero MD                                                    Specimen:    Gastric, Antrum, antrum biopsy                                                            • Final Diagnosis 02/15/2021    Final                    Value:This result contains rich text formatting which cannot be displayed here.   • Gross Description 02/15/2021    Final                    Value:This result contains rich text formatting which cannot be displayed here.       Diagnoses and all orders for this visit:    1. Esophageal stricture (Primary)  -     Case Request; Standing  -     Implement Anesthesia orders day of procedure.; Standing  -     Obtain informed consent; Standing  -     Case Request      Patient 55-year-old male with history of hypertension hyperlipidemia as well as diabetes initially presented to the emergency room with acute dysphagia found with a food bolus.  Esophageal stricture noted at time of foreign body removal has been on PPI since and doing fairly well.  At this point will repeat EGD with dilation in order to help prevent further episodes and follow-up clinically.

## 2021-03-04 NOTE — H&P (VIEW-ONLY)
Chief Complaint   Patient presents with   • Follow-up       Rashid Henderson is a  55 y.o. male here for a follow up visit for esophageal stricture.    HPI     Patient 55-year-old male with history of hypertension, hyperlipidemia and diabetes who presented last month with acute dysphagia.  Patient seen in the ER with food bolus underwent endoscopy for removal.  Distal endoscopic evaluation of the esophagus confirmed esophageal stenosis now here for follow-up.  Patient reports since endoscopy has been taking PPI daily without significant symptoms.    Past Medical History:   Diagnosis Date   • Abnormal electrocardiogram 1/19/2016   • Diabetes mellitus (CMS/HCC)    • Eczema 1/19/2016   • Hyperlipidemia    • Hypertension          Current Outpatient Medications:   •  ACCU-CHEK COMPACT PLUS test strip, TEST FAST BLOOD SUGAR D, Disp: , Rfl: 3  •  albuterol sulfate  (90 Base) MCG/ACT inhaler, Inhale 2 puffs 3 (Three) Times a Day., Disp: 1 inhaler, Rfl: 0  •  fluticasone (FLONASE) 50 MCG/ACT nasal spray, 2 sprays into the nostril(s) as directed by provider Daily., Disp: 1 bottle, Rfl: 0  •  Jardiance 25 MG tablet, TAKE 1 TABLET BY MOUTH DAILY, Disp: 30 tablet, Rfl: 5  •  lisinopril (PRINIVIL,ZESTRIL) 10 MG tablet, TAKE 1 TABLET BY MOUTH DAILY, Disp: 90 tablet, Rfl: 1  •  Omega-3 Fatty Acids (FISH OIL) 1000 MG capsule capsule, Take 3 capsules by mouth daily., Disp: , Rfl:   •  omeprazole (priLOSEC) 20 MG capsule, Take 2 capsules by mouth Daily., Disp: 90 capsule, Rfl: 3  •  sitaGLIPtin-metFORMIN (Janumet)  MG per tablet, Take 1 tablet by mouth 2 (Two) Times a Day With Meals., Disp: , Rfl:   •  valACYclovir (VALTREX) 500 MG tablet, TAKE 1 TABLET BY MOUTH TWICE DAILY FOR 3 DAYS (Patient taking differently: As Needed.), Disp: 6 tablet, Rfl: 0  •  Liraglutide (VICTOZA) 18 MG/3ML solution pen-injector injection, Inject 0.6 mg under the skin into the appropriate area as directed Daily., Disp: 3 mL, Rfl: 1  •  metFORMIN ER  (GLUCOPHAGE-XR) 500 MG 24 hr tablet, Take 2 tablets in the am with breakfast and 2 tabs with dinner, Disp: 360 tablet, Rfl: 1    Allergies   Allergen Reactions   • No Known Drug Allergy        Social History     Socioeconomic History   • Marital status:      Spouse name: Not on file   • Number of children: Not on file   • Years of education: Not on file   • Highest education level: Not on file   Tobacco Use   • Smoking status: Never Smoker   • Smokeless tobacco: Never Used   Substance and Sexual Activity   • Alcohol use: Yes   • Drug use: No       Family History   Problem Relation Age of Onset   • Other Mother         rhythm disorder   • Diabetes Father    • Diabetes Brother    • Hyperlipidemia Brother    • Cancer Maternal Grandmother    • Alcohol abuse Maternal Grandfather    • Cancer Paternal Grandmother    • Diabetes Paternal Grandfather        Review of Systems   Constitutional: Negative.    HENT: Negative for sore throat, tinnitus, trouble swallowing and voice change.    Respiratory: Negative.    Cardiovascular: Negative.    Gastrointestinal: Negative.    Musculoskeletal: Negative.    Skin: Negative.    Hematological: Negative.        Vitals:    03/04/21 1028   Temp: 98.6 °F (37 °C)       Physical Exam  Vitals signs reviewed.   Constitutional:       Appearance: He is well-developed.   HENT:      Head: Normocephalic and atraumatic.   Eyes:      General: No scleral icterus.     Pupils: Pupils are equal, round, and reactive to light.   Cardiovascular:      Rate and Rhythm: Normal rate and regular rhythm.      Heart sounds: Normal heart sounds.   Pulmonary:      Effort: Pulmonary effort is normal.      Breath sounds: Normal breath sounds. No wheezing or rales.   Abdominal:      General: Bowel sounds are normal. There is no distension.      Palpations: Abdomen is soft. There is no mass.      Tenderness: There is no abdominal tenderness.      Hernia: No hernia is present.   Skin:     General: Skin is warm and  dry.      Coloration: Skin is not jaundiced.      Findings: No rash.   Neurological:      General: No focal deficit present.      Mental Status: He is alert and oriented to person, place, and time.   Psychiatric:         Behavior: Behavior normal.         Thought Content: Thought content normal.         Orders Only on 02/18/2021   Component Date Value Ref Range Status   • Hemoglobin A1C 02/19/2021 8.00* 4.80 - 5.60 % Final   • TSH 02/19/2021 4.000  0.270 - 4.200 uIU/mL Final   • Total Cholesterol 02/19/2021 181  0 - 200 mg/dL Final   • Triglycerides 02/19/2021 153* 0 - 150 mg/dL Final   • HDL Cholesterol 02/19/2021 89* 40 - 60 mg/dL Final   • VLDL Cholesterol [place] 02/19/2021 25  5 - 40 mg/dL Final   • LDL Chol Calc ([facility]) 02/19/2021 67  0 - 100 mg/dL Final   • LDL/HDL RATIO 02/19/2021 0.69   Final   • Glucose 02/19/2021 176* 65 - 99 mg/dL Final   • BUN 02/19/2021 18  6 - 20 mg/dL Final   • Creatinine 02/19/2021 0.95  0.76 - 1.27 mg/dL Final   • eGFR Non  Am 02/19/2021 83  >60 mL/min/1.73 Final   • eGFR African Am 02/19/2021 101  >60 mL/min/1.73 Final   • BUN/Creatinine Ratio 02/19/2021 18.9  7.0 - 25.0 Final   • Sodium 02/19/2021 134* 136 - 145 mmol/L Final   • Potassium 02/19/2021 4.7  3.5 - 5.2 mmol/L Final   • Chloride 02/19/2021 98  98 - 107 mmol/L Final   • Total CO2 02/19/2021 25.7  22.0 - 29.0 mmol/L Final   • Calcium 02/19/2021 9.3  8.6 - 10.5 mg/dL Final   • Total Protein 02/19/2021 7.0  6.0 - 8.5 g/dL Final   • Albumin 02/19/2021 4.40  3.50 - 5.20 g/dL Final   • Globulin 02/19/2021 2.6  gm/dL Final   • A/G Ratio 02/19/2021 1.7  g/dL Final   • Total Bilirubin 02/19/2021 0.5  0.0 - 1.2 mg/dL Final   • Alkaline Phosphatase 02/19/2021 49  39 - 117 U/L Final   • AST (SGOT) 02/19/2021 24  1 - 40 U/L Final   • ALT (SGPT) 02/19/2021 29  1 - 41 U/L Final   • WBC 02/19/2021 6.75  3.40 - 10.80 10*3/mm3 Final   • RBC 02/19/2021 5.36  4.14 - 5.80 10*6/mm3 Final   • Hemoglobin 02/19/2021 16.1  13.0 - 17.7 g/dL  Final   • Hematocrit 02/19/2021 47.3  37.5 - 51.0 % Final   • MCV 02/19/2021 88.2  79.0 - 97.0 fL Final   • MCH 02/19/2021 30.0  26.6 - 33.0 pg Final   • MCHC 02/19/2021 34.0  31.5 - 35.7 g/dL Final   • RDW 02/19/2021 12.8  12.3 - 15.4 % Final   • Platelets 02/19/2021 254  140 - 450 10*3/mm3 Final   • Neutrophil Rel % 02/19/2021 53.2  42.7 - 76.0 % Final   • Lymphocyte Rel % 02/19/2021 32.9  19.6 - 45.3 % Final   • Monocyte Rel % 02/19/2021 8.1  5.0 - 12.0 % Final   • Eosinophil Rel % 02/19/2021 4.6  0.3 - 6.2 % Final   • Basophil Rel % 02/19/2021 0.6  0.0 - 1.5 % Final   • Neutrophils Absolute 02/19/2021 3.59  1.70 - 7.00 10*3/mm3 Final   • Lymphocytes Absolute 02/19/2021 2.22  0.70 - 3.10 10*3/mm3 Final   • Monocytes Absolute 02/19/2021 0.55  0.10 - 0.90 10*3/mm3 Final   • Eosinophils Absolute 02/19/2021 0.31  0.00 - 0.40 10*3/mm3 Final   • Basophils Absolute 02/19/2021 0.04  0.00 - 0.20 10*3/mm3 Final   • Immature Granulocyte Rel % 02/19/2021 0.6* 0.0 - 0.5 % Final   • Immature Grans Absolute 02/19/2021 0.04  0.00 - 0.05 10*3/mm3 Final   • nRBC 02/19/2021 0.0  0.0 - 0.2 /100 WBC Final   Admission on 02/15/2021, Discharged on 02/15/2021   Component Date Value Ref Range Status   • Glucose 02/15/2021 184* 65 - 99 mg/dL Final   • BUN 02/15/2021 20  6 - 20 mg/dL Final   • Creatinine 02/15/2021 0.94  0.76 - 1.27 mg/dL Final   • Sodium 02/15/2021 134* 136 - 145 mmol/L Final   • Potassium 02/15/2021 4.4  3.5 - 5.2 mmol/L Final   • Chloride 02/15/2021 95* 98 - 107 mmol/L Final   • CO2 02/15/2021 22.1  22.0 - 29.0 mmol/L Final   • Calcium 02/15/2021 10.2  8.6 - 10.5 mg/dL Final   • Total Protein 02/15/2021 7.1  6.0 - 8.5 g/dL Final   • Albumin 02/15/2021 4.60  3.50 - 5.20 g/dL Final   • ALT (SGPT) 02/15/2021 19  1 - 41 U/L Final   • AST (SGOT) 02/15/2021 20  1 - 40 U/L Final   • Alkaline Phosphatase 02/15/2021 48  39 - 117 U/L Final   • Total Bilirubin 02/15/2021 0.7  0.0 - 1.2 mg/dL Final   • eGFR Non African Amer  02/15/2021 84  >60 mL/min/1.73 Final   • Globulin 02/15/2021 2.5  gm/dL Final   • A/G Ratio 02/15/2021 1.8  g/dL Final   • BUN/Creatinine Ratio 02/15/2021 21.3  7.0 - 25.0 Final   • Anion Gap 02/15/2021 16.9* 5.0 - 15.0 mmol/L Final   • PTT 02/15/2021 25.7  22.7 - 35.4 seconds Final   • Protime 02/15/2021 12.3  11.7 - 14.2 Seconds Final   • INR 02/15/2021 0.93  0.90 - 1.10 Final   • WBC 02/15/2021 12.64* 3.40 - 10.80 10*3/mm3 Final   • RBC 02/15/2021 5.54  4.14 - 5.80 10*6/mm3 Final   • Hemoglobin 02/15/2021 16.0  13.0 - 17.7 g/dL Final   • Hematocrit 02/15/2021 48.4  37.5 - 51.0 % Final   • MCV 02/15/2021 87.4  79.0 - 97.0 fL Final   • MCH 02/15/2021 28.9  26.6 - 33.0 pg Final   • MCHC 02/15/2021 33.1  31.5 - 35.7 g/dL Final   • RDW 02/15/2021 13.2  12.3 - 15.4 % Final   • RDW-SD 02/15/2021 42.1  37.0 - 54.0 fl Final   • MPV 02/15/2021 9.0  6.0 - 12.0 fL Final   • Platelets 02/15/2021 279  140 - 450 10*3/mm3 Final   • Neutrophil % 02/15/2021 66.8  42.7 - 76.0 % Final   • Lymphocyte % 02/15/2021 21.8  19.6 - 45.3 % Final   • Monocyte % 02/15/2021 7.3  5.0 - 12.0 % Final   • Eosinophil % 02/15/2021 2.9  0.3 - 6.2 % Final   • Basophil % 02/15/2021 0.6  0.0 - 1.5 % Final   • Immature Grans % 02/15/2021 0.6* 0.0 - 0.5 % Final   • Neutrophils, Absolute 02/15/2021 8.44* 1.70 - 7.00 10*3/mm3 Final   • Lymphocytes, Absolute 02/15/2021 2.76  0.70 - 3.10 10*3/mm3 Final   • Monocytes, Absolute 02/15/2021 0.92* 0.10 - 0.90 10*3/mm3 Final   • Eosinophils, Absolute 02/15/2021 0.37  0.00 - 0.40 10*3/mm3 Final   • Basophils, Absolute 02/15/2021 0.07  0.00 - 0.20 10*3/mm3 Final   • Immature Grans, Absolute 02/15/2021 0.08* 0.00 - 0.05 10*3/mm3 Final   • nRBC 02/15/2021 0.0  0.0 - 0.2 /100 WBC Final   • COVID19 02/15/2021 Not Detected  Not Detected - Ref. Range Final   • Glucose 02/15/2021 151* 70 - 130 mg/dL Final   • Case Report 02/15/2021    Final                    Value:Surgical Pathology Report                         Case:  [id number]                                  Authorizing Provider:  Santo Grant MD    Collected:           02/15/2021 03:54 AM          Ordering Location:     [facility]  Received:            02/15/2021 08:35 AM                                 MAIN OR                                                                      Pathologist:           Alta Montero MD                                                    Specimen:    Gastric, Antrum, antrum biopsy                                                            • Final Diagnosis 02/15/2021    Final                    Value:This result contains rich text formatting which cannot be displayed here.   • Gross Description 02/15/2021    Final                    Value:This result contains rich text formatting which cannot be displayed here.       Diagnoses and all orders for this visit:    1. Esophageal stricture (Primary)  -     Case Request; Standing  -     Implement Anesthesia orders day of procedure.; Standing  -     Obtain informed consent; Standing  -     Case Request      Patient 55-year-old male with history of hypertension hyperlipidemia as well as diabetes initially presented to the emergency room with acute dysphagia found with a food bolus.  Esophageal stricture noted at time of foreign body removal has been on PPI since and doing fairly well.  At this point will repeat EGD with dilation in order to help prevent further episodes and follow-up clinically.

## 2021-03-08 NOTE — TELEPHONE ENCOUNTER
Patient wife called and they went to the pharmacy to get the needles and they were not called in. Pharmacist said if someone from the office called she could tell them how to put it in and what is needed. She says they are Charisse needles 4ml box 100. Please call asap at [phone].    [facility] #[id number] - [facility] [address] - [phone]  - [phone]   [phone]

## 2021-03-18 ENCOUNTER — TRANSCRIBE ORDERS (OUTPATIENT)
Dept: SLEEP MEDICINE | Facility: HOSPITAL | Age: 56
End: 2021-03-18

## 2021-03-18 DIAGNOSIS — Z01.818 OTHER SPECIFIED PRE-OPERATIVE EXAMINATION: Primary | ICD-10-CM

## 2021-03-22 ENCOUNTER — LAB (OUTPATIENT)
Dept: LAB | Facility: HOSPITAL | Age: 56
End: 2021-03-22

## 2021-03-22 DIAGNOSIS — Z01.818 OTHER SPECIFIED PRE-OPERATIVE EXAMINATION: ICD-10-CM

## 2021-03-22 PROCEDURE — C9803 HOPD COVID-19 SPEC COLLECT: HCPCS

## 2021-03-22 PROCEDURE — U0004 COV-19 TEST NON-CDC HGH THRU: HCPCS

## 2021-03-23 LAB — SARS-COV-2 RNA RESP QL NAA+PROBE: NOT DETECTED

## 2021-03-24 ENCOUNTER — ANESTHESIA (OUTPATIENT)
Dept: GASTROENTEROLOGY | Facility: HOSPITAL | Age: 56
End: 2021-03-24

## 2021-03-24 ENCOUNTER — BULK ORDERING (OUTPATIENT)
Dept: CASE MANAGEMENT | Facility: OTHER | Age: 56
End: 2021-03-24

## 2021-03-24 ENCOUNTER — ANESTHESIA EVENT (OUTPATIENT)
Dept: GASTROENTEROLOGY | Facility: HOSPITAL | Age: 56
End: 2021-03-24

## 2021-03-24 ENCOUNTER — HOSPITAL ENCOUNTER (OUTPATIENT)
Facility: HOSPITAL | Age: 56
Setting detail: HOSPITAL OUTPATIENT SURGERY
Discharge: HOME | End: 2021-03-24
Attending: INTERNAL MEDICINE | Admitting: INTERNAL MEDICINE

## 2021-03-24 VITALS
HEIGHT: 70 IN | SYSTOLIC BLOOD PRESSURE: 121 MMHG | HEART RATE: 88 BPM | DIASTOLIC BLOOD PRESSURE: 83 MMHG | BODY MASS INDEX: 36.51 KG/M2 | RESPIRATION RATE: 18 BRPM | WEIGHT: 255 LBS | OXYGEN SATURATION: 98 %

## 2021-03-24 DIAGNOSIS — K22.2 ESOPHAGEAL STRICTURE: ICD-10-CM

## 2021-03-24 DIAGNOSIS — Z23 IMMUNIZATION DUE: ICD-10-CM

## 2021-03-24 LAB — GLUCOSE BLDC GLUCOMTR-MCNC: 178 MG/DL (ref 70–130)

## 2021-03-24 PROCEDURE — 88342 IMHCHEM/IMCYTCHM 1ST ANTB: CPT | Performed by: INTERNAL MEDICINE

## 2021-03-24 PROCEDURE — 43239 EGD BIOPSY SINGLE/MULTIPLE: CPT | Performed by: INTERNAL MEDICINE

## 2021-03-24 PROCEDURE — 25010000002 PROPOFOL 10 MG/ML EMULSION: Performed by: NURSE ANESTHETIST, CERTIFIED REGISTERED

## 2021-03-24 PROCEDURE — 88305 TISSUE EXAM BY PATHOLOGIST: CPT | Performed by: INTERNAL MEDICINE

## 2021-03-24 PROCEDURE — 43249 ESOPH EGD DILATION <30 MM: CPT | Performed by: INTERNAL MEDICINE

## 2021-03-24 PROCEDURE — C1726 CATH, BAL DIL, NON-VASCULAR: HCPCS | Performed by: INTERNAL MEDICINE

## 2021-03-24 PROCEDURE — 82962 GLUCOSE BLOOD TEST: CPT

## 2021-03-24 RX ORDER — LIDOCAINE HYDROCHLORIDE 10 MG/ML
0.5 INJECTION, SOLUTION INFILTRATION; PERINEURAL ONCE AS NEEDED
Status: DISCONTINUED | OUTPATIENT
Start: 2021-03-24 | End: 2021-03-24 | Stop reason: HOSPADM

## 2021-03-24 RX ORDER — SODIUM CHLORIDE, SODIUM LACTATE, POTASSIUM CHLORIDE, CALCIUM CHLORIDE 600; 310; 30; 20 MG/100ML; MG/100ML; MG/100ML; MG/100ML
1000 INJECTION, SOLUTION INTRAVENOUS CONTINUOUS
Status: DISCONTINUED | OUTPATIENT
Start: 2021-03-24 | End: 2021-03-24 | Stop reason: HOSPADM

## 2021-03-24 RX ORDER — PROPOFOL 10 MG/ML
VIAL (ML) INTRAVENOUS AS NEEDED
Status: DISCONTINUED | OUTPATIENT
Start: 2021-03-24 | End: 2021-03-24 | Stop reason: SURG

## 2021-03-24 RX ORDER — SODIUM CHLORIDE 0.9 % (FLUSH) 0.9 %
10 SYRINGE (ML) INJECTION AS NEEDED
Status: DISCONTINUED | OUTPATIENT
Start: 2021-03-24 | End: 2021-03-24 | Stop reason: HOSPADM

## 2021-03-24 RX ORDER — LIDOCAINE HYDROCHLORIDE 20 MG/ML
INJECTION, SOLUTION INFILTRATION; PERINEURAL AS NEEDED
Status: DISCONTINUED | OUTPATIENT
Start: 2021-03-24 | End: 2021-03-24 | Stop reason: SURG

## 2021-03-24 RX ADMIN — SODIUM CHLORIDE, POTASSIUM CHLORIDE, SODIUM LACTATE AND CALCIUM CHLORIDE 1000 ML: 600; 310; 30; 20 INJECTION, SOLUTION INTRAVENOUS at 08:18

## 2021-03-24 RX ADMIN — PROPOFOL 250 MCG/KG/MIN: 10 INJECTION, EMULSION INTRAVENOUS at 08:36

## 2021-03-24 RX ADMIN — LIDOCAINE HYDROCHLORIDE 100 MG: 20 INJECTION, SOLUTION INFILTRATION; PERINEURAL at 08:36

## 2021-03-24 RX ADMIN — PROPOFOL 80 MG: 10 INJECTION, EMULSION INTRAVENOUS at 08:36

## 2021-03-24 NOTE — BRIEF OP NOTE
ESOPHAGOGASTRODUODENOSCOPY WITH DILATATION  Progress Note    Rashid Henderson  3/24/2021    Pre-op Diagnosis:   Esophageal stricture [K22.2]       Post-Op Diagnosis Codes:     * Esophageal stricture [K22.2]     * Gastritis [K29.70]     * Hiatal hernia [K44.9]    Procedure/CPT® Codes:        Procedure(s):  ESOPHAGOGASTRODUODENOSCOPY WITH 15-18MM BALLOON DILATATION AND BIOPSIES    Surgeon(s):  Santo Grant MD    Anesthesia: Monitored Anesthesia Care    Staff:   Endo Technician: Nathan Ellison  Endo Nurse: Khadra Jacobs RN         Estimated Blood Loss: minimal    Urine Voided: * No values recorded between 3/24/2021  8:30 AM and 3/24/2021  8:48 AM *    Specimens:                Specimens     ID Source Type Tests Collected By Collected At Frozen?    A Gastric, Antrum Tissue · TISSUE PATHOLOGY EXAM   Santo Grant MD 3/24/21 9215     Description: ANTRUM BIOPSIES    B Esophagus, Mid Tissue · TISSUE PATHOLOGY EXAM   Santo Grant MD 3/24/21 1613     Description: MID ESOPHAGUS BIOPSIES                Drains: * No LDAs found *    Findings: EGD with balloon dilation performed revealing distal esophageal stenosis balloon dilation to 15, 16-1/2 and 18 mm.  Biopsy of the antrum for gastritis was obtained as was the midesophagus to rule out eosinophilic esophagitis.  Duodenal mucosa was normal throughout.    Complications: None          Santo Grant MD     Date: 3/24/2021  Time: 08:52 EDT

## 2021-03-24 NOTE — ANESTHESIA PREPROCEDURE EVALUATION
Anesthesia Evaluation     no history of anesthetic complications:               Airway   Mallampati: II  TM distance: >3 FB  Neck ROM: full  Dental - normal exam     Comment: TMJ    Pulmonary    (+) sleep apnea on CPAP,   (-) shortness of breath, recent URI, not a smoker  Cardiovascular     (+) hypertension,   (-) dysrhythmias, angina, hyperlipidemia      Neuro/Psych  (-) dizziness/light headedness, syncope, weakness, numbness  GI/Hepatic/Renal/Endo    (+) obesity, morbid obesity,  diabetes mellitus,     Musculoskeletal     Abdominal    Substance History      OB/GYN          Other                      Anesthesia Plan    ASA 3     MAC     intravenous induction     Anesthetic plan, all risks, benefits, and alternatives have been provided, discussed and informed consent has been obtained with: patient.

## 2021-03-24 NOTE — ANESTHESIA POSTPROCEDURE EVALUATION
"Patient: Rashid Henderson    Procedure Summary     Date: 03/24/21 Room / Location: [facility] ENDOSCOPY 8 /  [facility]    Anesthesia Start: 0830 Anesthesia Stop: 0856    Procedure: ESOPHAGOGASTRODUODENOSCOPY WITH 15-18MM BALLOON DILATATION AND BIOPSIES (N/A Esophagus) Diagnosis:       Esophageal stricture      Gastritis      Hiatal hernia      (Esophageal stricture [K22.2])    Surgeons: Santo Grant MD Provider: Lisbet Martinez MD    Anesthesia Type: MAC ASA Status: 3          Anesthesia Type: MAC    Vitals  Vitals Value Taken Time   /83 03/24/21 0914   Temp     Pulse 88 03/24/21 0914   Resp 18 03/24/21 0914   SpO2 98 % 03/24/21 0914           Post Anesthesia Care and Evaluation    Patient location during evaluation: PHASE II  Patient participation: complete - patient participated  Level of consciousness: awake and alert  Pain management: adequate  Airway patency: patent  Anesthetic complications: No anesthetic complications    Cardiovascular status: acceptable  Respiratory status: acceptable  Hydration status: acceptable    Comments: /83   Pulse 88   Resp 18   Ht 177.8 cm (70\")   Wt 116 kg (255 lb)   SpO2 98%   BMI 36.59 kg/m²         "

## 2021-03-26 LAB
LAB AP CASE REPORT: NORMAL
LAB AP DIAGNOSIS COMMENT: NORMAL
PATH REPORT.ADDENDUM SPEC: NORMAL
PATH REPORT.FINAL DX SPEC: NORMAL
PATH REPORT.GROSS SPEC: NORMAL

## 2021-04-01 ENCOUNTER — TELEPHONE (OUTPATIENT)
Dept: GASTROENTEROLOGY | Facility: CLINIC | Age: 56
End: 2021-04-01

## 2021-04-01 NOTE — TELEPHONE ENCOUNTER
----- Message from Santo Grant MD sent at 2/23/2021  1:45 PM EST -----  Biopsies with mild gastritis, please make sure patient follows up in the office so we can schedule him for EGD dilation before he gets food bolus again    Per : Biopsies with acute gastritis, no eosinophilic esophagitis noted.  Continue PPI and call for symptoms

## 2021-05-24 RX ORDER — VALACYCLOVIR HYDROCHLORIDE 500 MG/1
TABLET, FILM COATED ORAL
Qty: 6 TABLET | Refills: 0 | Status: SHIPPED | OUTPATIENT
Start: 2021-05-24 | End: 2021-07-27

## 2021-06-01 RX ORDER — LISINOPRIL 10 MG/1
10 TABLET ORAL DAILY
Qty: 90 TABLET | Refills: 1 | Status: SHIPPED | OUTPATIENT
Start: 2021-06-01 | End: 2021-11-30

## 2021-06-24 DIAGNOSIS — E78.5 HYPERLIPIDEMIA, UNSPECIFIED HYPERLIPIDEMIA TYPE: Primary | ICD-10-CM

## 2021-06-24 DIAGNOSIS — E11.9 TYPE 2 DIABETES MELLITUS WITHOUT COMPLICATION, WITHOUT LONG-TERM CURRENT USE OF INSULIN (HCC): ICD-10-CM

## 2021-06-24 DIAGNOSIS — I10 ESSENTIAL HYPERTENSION: ICD-10-CM

## 2021-06-29 LAB
CHOLEST SERPL-MCNC: 193 MG/DL (ref 0–200)
HBA1C MFR BLD: 8 % (ref 4.8–5.6)
HDLC SERPL-MCNC: 74 MG/DL (ref 40–60)
LDLC SERPL CALC-MCNC: 88 MG/DL (ref 0–100)
LDLC/HDLC SERPL: 1.11 {RATIO}
T4 FREE SERPL-MCNC: 1.59 NG/DL (ref 0.93–1.7)
TRIGL SERPL-MCNC: 183 MG/DL (ref 0–150)
TSH SERPL DL<=0.005 MIU/L-ACNC: 3.28 UIU/ML (ref 0.27–4.2)
VLDLC SERPL CALC-MCNC: 31 MG/DL (ref 5–40)

## 2021-06-30 ENCOUNTER — OFFICE VISIT (OUTPATIENT)
Dept: FAMILY MEDICINE CLINIC | Facility: CLINIC | Age: 56
End: 2021-06-30

## 2021-06-30 VITALS
OXYGEN SATURATION: 98 % | BODY MASS INDEX: 37.59 KG/M2 | HEART RATE: 91 BPM | TEMPERATURE: 97.3 F | WEIGHT: 262.6 LBS | SYSTOLIC BLOOD PRESSURE: 126 MMHG | HEIGHT: 70 IN | RESPIRATION RATE: 16 BRPM | DIASTOLIC BLOOD PRESSURE: 84 MMHG

## 2021-06-30 DIAGNOSIS — E78.5 HYPERLIPIDEMIA, UNSPECIFIED HYPERLIPIDEMIA TYPE: Primary | ICD-10-CM

## 2021-06-30 DIAGNOSIS — E11.9 TYPE 2 DIABETES MELLITUS WITHOUT COMPLICATION, WITHOUT LONG-TERM CURRENT USE OF INSULIN (HCC): ICD-10-CM

## 2021-06-30 DIAGNOSIS — I10 ESSENTIAL HYPERTENSION: ICD-10-CM

## 2021-06-30 DIAGNOSIS — M25.512 ACUTE PAIN OF LEFT SHOULDER: ICD-10-CM

## 2021-06-30 PROCEDURE — 99214 OFFICE O/P EST MOD 30 MIN: CPT | Performed by: INTERNAL MEDICINE

## 2021-06-30 NOTE — PROGRESS NOTES
Chief Complaint  Follow-up ( hld ) and Shoulder Pain (pt states he fell off the ladder a couple weeks ago and is now having shoulder pain and finger pain )    Subjective          Rashid Henderson presents to [facility]  Diabetes  He has type 2 diabetes mellitus. No MedicAlert identification noted. Pertinent negatives for hypoglycemia include no confusion, dizziness, headaches, hunger, mood changes, nervousness/anxiousness, pallor, seizures, sleepiness, speech difficulty or sweats. Pertinent negatives for diabetes include no blurred vision, no chest pain, no fatigue, no foot paresthesias, no foot ulcerations, no polydipsia, no polyphagia, no polyuria, no visual change, no weakness and no weight loss. Pertinent negatives for hypoglycemia complications include no blackouts, no hospitalization, no nocturnal hypoglycemia, no required assistance and no required glucagon injection. Pertinent negatives for diabetic complications include no CVA, heart disease, impotence, nephropathy, peripheral neuropathy, PVD or retinopathy. Risk factors for coronary artery disease include dyslipidemia, hypertension, obesity and stress. Current diabetic treatment includes oral agent (dual therapy). He is compliant with treatment most of the time. He is currently taking insulin pre-breakfast and pre-dinner. His weight is stable. He has not had a previous visit with a dietitian. He participates in exercise intermittently. He monitors blood glucose at home 3-4 x per week. Blood glucose monitoring compliance is adequate. There is no change in his home blood glucose trend. His breakfast blood glucose is taken between 7-8 am. His breakfast blood glucose range is generally 140-180 mg/dl. He does not see a podiatrist.Eye exam is current.     Here for f/u on NIDDM on metformin  mg 2 tabs bid, jardiance 25 mg qd and victoza 0.6 mg daily.  FBS running 150 average.  a1c continues to be 8%.  No weight loss.  Appetite is  "unchanged.  Eye exam was normal this year.  Back to work at the office.  Not exercising enough.  Has been very busy at work and hasn't had time to exercise.  In March, he fell from a ladder and landed on his left shoulder.  He did not get x-rays because he had full range of motion and really no limitation in his abilities other than his shoulder hurt.  He now has pain when he sleeps on the left side and also certain movements he has pain but his range of motion is normal.  He also has pain in the PIP joint of his left ring finger without swelling or redness.  Objective   Vital Signs:   /84   Pulse 91   Temp 97.3 °F (36.3 °C) (Temporal)   Resp 16   Ht 177.8 cm (70\")   Wt 119 kg (262 lb 9.6 oz)   SpO2 98%   BMI 37.68 kg/m²     Physical Exam  Vitals and nursing note reviewed.   Constitutional:       Appearance: Normal appearance. He is well-developed.   HENT:      Head: Normocephalic and atraumatic.      Right Ear: External ear normal.      Left Ear: External ear normal.   Eyes:      Extraocular Movements: Extraocular movements intact.      Conjunctiva/sclera: Conjunctivae normal.   Neck:      Vascular: No carotid bruit.   Cardiovascular:      Rate and Rhythm: Normal rate and regular rhythm.      Heart sounds: Normal heart sounds.      Comments: No bruits  Pulmonary:      Effort: Pulmonary effort is normal. No respiratory distress.      Breath sounds: Normal breath sounds. No wheezing or rales.   Abdominal:      General: Bowel sounds are normal. There is no distension.      Palpations: Abdomen is soft. There is no mass.      Tenderness: There is no abdominal tenderness.   Musculoskeletal:      Cervical back: Neck supple.      Right lower leg: No edema.      Comments: Full range of motion of his left shoulder.  He has full range of motion of his left ring finger as well.  There is a very minimal amount of tenderness in the PIP joint of the left ring finger.  No redness   Lymphadenopathy:      Cervical: No " cervical adenopathy.   Skin:     General: Skin is warm.   Neurological:      General: No focal deficit present.      Mental Status: He is alert and oriented to person, place, and time.   Psychiatric:         Mood and Affect: Mood normal.         Behavior: Behavior normal.         Thought Content: Thought content normal.         Judgment: Judgment normal.        Result Review :                 Assessment and Plan    Diagnoses and all orders for this visit:    1. Hyperlipidemia, unspecified hyperlipidemia type (Primary)    2. Essential hypertension    3. Type 2 diabetes mellitus without complication, without long-term current use of insulin (CMS/[facility])    4. Acute pain of left shoulder    Other orders  -     Discontinue: Liraglutide (VICTOZA) 18 MG/3ML solution pen-injector injection; Inject 1.2 mg under the skin into the appropriate area as directed Daily.  Dispense: 3 mL; Refill: 3  -     Liraglutide (VICTOZA) 18 MG/3ML solution pen-injector injection; Inject 1.2 mg under the skin into the appropriate area as directed Daily.  Dispense: 6 pen; Refill: 3  -     empagliflozin (Jardiance) 25 MG tablet tablet; Take 1 tablet by mouth Daily.  Dispense: 90 tablet; Refill: 1        Follow Up   Return in about 4 months (around 10/30/2021).  Patient was given instructions and counseling regarding his condition or for health maintenance advice. Please see specific information pulled into the AVS if appropriate.       Answers for HPI/ROS submitted by the patient on 6/30/2021  What is the primary reason for your visit?: Diabetes    For his left shoulder, we discussed x-rays but feel that the yield is low 3 months after the injury.  He has full range of motion so do not since there would be a fracture and certainly no dislocation.  He is going to do band exercises that he reviews on his Google search for shoulder rehabilitation.  If after 1 to 2 weeks he is not seeing improvement, I have suggested they let me know and I will refer  him to Dr. John Martinez for further evaluation and treatment.  I also suggested that he use Voltaren gel over-the-counter topically for his ring finger arthritis and he could also tried on his shoulder as well.  We have increased his Victoza from 0.6 mg daily to 1.2 mg daily to help improve his glycemic control.  I would love to see his A1c in the low 7% range and his fasting sugars under 140.  We discussed the importance of portion control, carb limitation, exercise and overall dietary choices.  He will continue the Metformin 1000 mg twice daily and the Jardiance 25 mg daily.  I will see him back in 4 months.  He also will continue his lisinopril for hypertension which is well controlled as well as his omeprazole for GERD.

## 2021-07-27 RX ORDER — VALACYCLOVIR HYDROCHLORIDE 500 MG/1
TABLET, FILM COATED ORAL
Qty: 6 TABLET | Refills: 0 | Status: SHIPPED | OUTPATIENT
Start: 2021-07-27 | End: 2021-09-23

## 2021-08-12 RX ORDER — OMEPRAZOLE 20 MG/1
40 CAPSULE, DELAYED RELEASE ORAL DAILY
Qty: 90 CAPSULE | Refills: 3 | Status: SHIPPED | OUTPATIENT
Start: 2021-08-12 | End: 2022-02-17 | Stop reason: SDUPTHER

## 2021-09-07 NOTE — TELEPHONE ENCOUNTER
Rx Refill Note  Requested Prescriptions     Pending Prescriptions Disp Refills   • metFORMIN ER (GLUCOPHAGE-XR) 500 MG 24 hr tablet [Pharmacy Med Name: METFORMIN ER 500MG 24HR TABS] 360 tablet 1     Sig: TAKE 2 TABLETS BY MOUTH EVERY MORNING WITH BREAKFAST AND TAKE 2 TABLETS BY MOUTH EVERY EVENING WITH DINNER      Last office visit with prescribing clinician: 6/30/2021      Next office visit with prescribing clinician: 1/11/2022       {TIP  Please add Last Relevant Lab Date if appropriate: 6/28/21    Ashley Tilley MA  09/07/21, 13:40 EDT

## 2021-09-08 RX ORDER — METFORMIN HYDROCHLORIDE 500 MG/1
TABLET, EXTENDED RELEASE ORAL
Qty: 360 TABLET | Refills: 1 | Status: SHIPPED | OUTPATIENT
Start: 2021-09-08 | End: 2022-03-09

## 2021-09-23 RX ORDER — VALACYCLOVIR HYDROCHLORIDE 500 MG/1
TABLET, FILM COATED ORAL
Qty: 6 TABLET | Refills: 0 | Status: SHIPPED | OUTPATIENT
Start: 2021-09-23 | End: 2022-01-04

## 2021-11-29 NOTE — TELEPHONE ENCOUNTER
Rx Refill Note  Requested Prescriptions     Pending Prescriptions Disp Refills   • lisinopril (PRINIVIL,ZESTRIL) 10 MG tablet [Pharmacy Med Name: LISINOPRIL 10MG TABLETS] 90 tablet 1     Sig: TAKE 1 TABLET BY MOUTH DAILY      Last office visit with prescribing clinician: 6/30/2021      Next office visit with prescribing clinician: 1/11/2022            Ashley Tilley MA  11/29/21, 12:47 EST

## 2021-11-30 RX ORDER — LISINOPRIL 10 MG/1
10 TABLET ORAL DAILY
Qty: 90 TABLET | Refills: 1 | Status: SHIPPED | OUTPATIENT
Start: 2021-11-30 | End: 2022-05-03

## 2021-12-01 RX ORDER — LIRAGLUTIDE 6 MG/ML
INJECTION SUBCUTANEOUS
Qty: 6 ML | Refills: 1 | Status: SHIPPED | OUTPATIENT
Start: 2021-12-01 | End: 2022-02-10

## 2021-12-30 DIAGNOSIS — E11.9 TYPE 2 DIABETES MELLITUS WITHOUT COMPLICATION, WITHOUT LONG-TERM CURRENT USE OF INSULIN (HCC): ICD-10-CM

## 2021-12-30 DIAGNOSIS — E78.5 HYPERLIPIDEMIA, UNSPECIFIED HYPERLIPIDEMIA TYPE: Primary | ICD-10-CM

## 2021-12-30 DIAGNOSIS — I10 ESSENTIAL HYPERTENSION: ICD-10-CM

## 2022-01-04 RX ORDER — VALACYCLOVIR HYDROCHLORIDE 500 MG/1
TABLET, FILM COATED ORAL
Qty: 6 TABLET | Refills: 0 | Status: SHIPPED | OUTPATIENT
Start: 2022-01-04 | End: 2022-03-28

## 2022-01-04 NOTE — TELEPHONE ENCOUNTER
Rx Refill Note  Requested Prescriptions     Pending Prescriptions Disp Refills   • valACYclovir (VALTREX) 500 MG tablet [Pharmacy Med Name: VALACYCLOVIR 500MG TABLETS] 6 tablet 0     Sig: TAKE 1 TABLET BY MOUTH TWICE DAILY FOR 3 DAYS      Last office visit with prescribing clinician: 6/30/2021      Next office visit with prescribing clinician: 1/11/2022            Beena Jackman MA  01/04/22, 11:35 EST

## 2022-01-05 LAB
ALBUMIN SERPL-MCNC: 4.6 G/DL (ref 3.8–4.9)
ALBUMIN/GLOB SERPL: 1.8 {RATIO} (ref 1.2–2.2)
ALP SERPL-CCNC: 57 IU/L (ref 44–121)
ALT SERPL-CCNC: 42 IU/L (ref 0–44)
AST SERPL-CCNC: 26 IU/L (ref 0–40)
BASOPHILS # BLD AUTO: 0 X10E3/UL (ref 0–0.2)
BASOPHILS NFR BLD AUTO: 1 %
BILIRUB SERPL-MCNC: 0.4 MG/DL (ref 0–1.2)
BUN SERPL-MCNC: 24 MG/DL (ref 6–24)
BUN/CREAT SERPL: 24 (ref 9–20)
CALCIUM SERPL-MCNC: 9.6 MG/DL (ref 8.7–10.2)
CHLORIDE SERPL-SCNC: 98 MMOL/L (ref 96–106)
CHOLEST SERPL-MCNC: 208 MG/DL (ref 100–199)
CO2 SERPL-SCNC: 22 MMOL/L (ref 20–29)
CREAT SERPL-MCNC: 1.01 MG/DL (ref 0.76–1.27)
EOSINOPHIL # BLD AUTO: 0.2 X10E3/UL (ref 0–0.4)
EOSINOPHIL NFR BLD AUTO: 3 %
ERYTHROCYTE [DISTWIDTH] IN BLOOD BY AUTOMATED COUNT: 12.8 % (ref 11.6–15.4)
GLOBULIN SER CALC-MCNC: 2.6 G/DL (ref 1.5–4.5)
GLUCOSE SERPL-MCNC: 161 MG/DL (ref 65–99)
HBA1C MFR BLD: 8.1 % (ref 4.8–5.6)
HCT VFR BLD AUTO: 47.8 % (ref 37.5–51)
HDLC SERPL-MCNC: 91 MG/DL
HGB BLD-MCNC: 16.4 G/DL (ref 13–17.7)
IMM GRANULOCYTES # BLD AUTO: 0 X10E3/UL (ref 0–0.1)
IMM GRANULOCYTES NFR BLD AUTO: 0 %
LDLC SERPL CALC-MCNC: 88 MG/DL (ref 0–99)
LDLC/HDLC SERPL: 1 RATIO (ref 0–3.6)
LYMPHOCYTES # BLD AUTO: 2.1 X10E3/UL (ref 0.7–3.1)
LYMPHOCYTES NFR BLD AUTO: 31 %
MCH RBC QN AUTO: 30.7 PG (ref 26.6–33)
MCHC RBC AUTO-ENTMCNC: 34.3 G/DL (ref 31.5–35.7)
MCV RBC AUTO: 90 FL (ref 79–97)
MONOCYTES # BLD AUTO: 0.7 X10E3/UL (ref 0.1–0.9)
MONOCYTES NFR BLD AUTO: 10 %
NEUTROPHILS # BLD AUTO: 3.7 X10E3/UL (ref 1.4–7)
NEUTROPHILS NFR BLD AUTO: 55 %
PLATELET # BLD AUTO: 258 X10E3/UL (ref 150–450)
POTASSIUM SERPL-SCNC: 4.9 MMOL/L (ref 3.5–5.2)
PROT SERPL-MCNC: 7.2 G/DL (ref 6–8.5)
RBC # BLD AUTO: 5.34 X10E6/UL (ref 4.14–5.8)
SODIUM SERPL-SCNC: 136 MMOL/L (ref 134–144)
T4 FREE SERPL-MCNC: 1.54 NG/DL (ref 0.82–1.77)
TRIGL SERPL-MCNC: 176 MG/DL (ref 0–149)
TSH SERPL DL<=0.005 MIU/L-ACNC: 3.47 UIU/ML (ref 0.45–4.5)
VLDLC SERPL CALC-MCNC: 29 MG/DL (ref 5–40)
WBC # BLD AUTO: 6.6 X10E3/UL (ref 3.4–10.8)

## 2022-01-11 ENCOUNTER — TELEPHONE (OUTPATIENT)
Dept: SLEEP MEDICINE | Facility: HOSPITAL | Age: 57
End: 2022-01-11

## 2022-01-11 ENCOUNTER — OFFICE VISIT (OUTPATIENT)
Dept: SLEEP MEDICINE | Facility: HOSPITAL | Age: 57
End: 2022-01-11

## 2022-01-11 VITALS
BODY MASS INDEX: 37.22 KG/M2 | OXYGEN SATURATION: 97 % | HEART RATE: 86 BPM | WEIGHT: 260 LBS | HEIGHT: 70 IN | SYSTOLIC BLOOD PRESSURE: 149 MMHG | DIASTOLIC BLOOD PRESSURE: 86 MMHG

## 2022-01-11 DIAGNOSIS — G47.33 OSA (OBSTRUCTIVE SLEEP APNEA): Primary | ICD-10-CM

## 2022-01-11 PROCEDURE — G0463 HOSPITAL OUTPT CLINIC VISIT: HCPCS

## 2022-01-11 NOTE — PROGRESS NOTES
"Rashid Henderson  1965  [id number]     DATE OF VISIT:1/11/2022   HISTORY:  Mr. Rashid Henderson is a 56 y.o.  male has a history of hypertension, hyperlipidemia and obstructive sleep apnea syndrome and is here for follow-up.      Patient has severe obstructive sleep apnea syndrome.  Polysomnography in the past has revealed apnea-hypopnea index of 94/sleep hour and minimum SpO2 of 69%. Patient was hypoxic for 55% of the study time. The patient had hypersomnia with [place] Sleepiness Scale score of 11.     The patient was successfully treated with CPAP therapy and the patient is now on auto CPAP therapy with mean pressure of 8.5 cmH20 and AHI down to 1.7. Compliance data indicates excellent compliance with 96.7% usage for more than 4 hours with an average usage of 6 hours and 55 minutes. The patient is compliant and benefiting from it. The patient's [place] Sleepiness Scale score is down to 2.  The patient CPAP machine was issued in 2016 and he is more than 5 years old and he is eligible for a replacement machine.  He has a DreamStation auto CPAP which is under recall from the .  Given the instructions to the patient regarding recall and to contact the .    Sleep schedule: Bedtime 10:30 PM-6:30 AM.  Sleep latency 15 minutes.  Gets about  7 hours and 30 minutes of sleep.    Caffeine intake: 4 cups of coffee in the morning and may be a diet soda.    PHYSICAL EXAMINATION:  Vitals:    01/11/22 0825   BP: 149/86   Pulse: 86   SpO2: 97%   Weight: 118 kg (260 lb)   Height: 177.8 cm (70\")   Body mass index is 37.31 kg/m².   Well-developed, well-nourished in no apparent distress.    IMPRESSION: Patient with severe obstructive sleep apnea syndrome successfully treated with auto CPAP therapy and the patient is compliant and benefiting from it.     The patient CPAP machine was issued in 2016 and he is more than 5 years old and he is eligible for a replacement machine.  He has a DreamStation auto " CPAP which is under recall from the .  Given the instructions to the patient regarding recall and to contact the .      RECOMMENDATIONS: Continue present auto CPAP. Followup 1 year.     Andrew Ni M.D.  1/11/2022

## 2022-01-11 NOTE — PATIENT INSTRUCTIONS
The patient is [employer] dream station auto CPAP which is on recall by the  and I have given the instructions to the patient regarding the same to register his/her CPAP/BIPAP device on [employer] website and to contact the [employer] company.  The patient was also instructed not to use any form of ozone  such as so clean.    Phone number [phone] to call regarding registration of [employer] CPAP/BiPAP machine which is under recall.    Andrew Ni MD

## 2022-01-12 ENCOUNTER — OFFICE VISIT (OUTPATIENT)
Dept: FAMILY MEDICINE CLINIC | Facility: CLINIC | Age: 57
End: 2022-01-12

## 2022-01-12 VITALS
DIASTOLIC BLOOD PRESSURE: 80 MMHG | TEMPERATURE: 97.1 F | BODY MASS INDEX: 37.31 KG/M2 | RESPIRATION RATE: 16 BRPM | HEART RATE: 71 BPM | SYSTOLIC BLOOD PRESSURE: 142 MMHG | HEIGHT: 70 IN | OXYGEN SATURATION: 98 %

## 2022-01-12 DIAGNOSIS — E11.9 TYPE 2 DIABETES MELLITUS WITHOUT COMPLICATION, WITHOUT LONG-TERM CURRENT USE OF INSULIN: ICD-10-CM

## 2022-01-12 DIAGNOSIS — Z20.822 EXPOSURE TO COVID-19 VIRUS: Primary | ICD-10-CM

## 2022-01-12 DIAGNOSIS — I10 PRIMARY HYPERTENSION: ICD-10-CM

## 2022-01-12 DIAGNOSIS — R74.8 ELEVATED LIVER ENZYMES: ICD-10-CM

## 2022-01-12 DIAGNOSIS — E78.5 HYPERLIPIDEMIA, UNSPECIFIED HYPERLIPIDEMIA TYPE: ICD-10-CM

## 2022-01-12 PROCEDURE — 99214 OFFICE O/P EST MOD 30 MIN: CPT | Performed by: INTERNAL MEDICINE

## 2022-01-12 RX ORDER — PEN NEEDLE, DIABETIC 32GX 5/32"
1 NEEDLE, DISPOSABLE MISCELLANEOUS SEE ADMIN INSTRUCTIONS
COMMUNITY
Start: 2021-11-01 | End: 2022-06-26

## 2022-01-12 NOTE — PROGRESS NOTES
Chief Complaint  Follow-up ( dm )    Subjective          Rashid Henderson presents to [facility]  Diabetes  He has type 2 diabetes mellitus. No MedicAlert identification noted. The initial diagnosis of diabetes was made 10 years ago. Pertinent negatives for hypoglycemia include no confusion, dizziness, headaches, hunger, mood changes, nervousness/anxiousness, pallor, seizures, sleepiness, speech difficulty, sweats or tremors. Pertinent negatives for diabetes include no blurred vision, no chest pain, no fatigue, no foot paresthesias, no foot ulcerations, no polydipsia, no polyphagia, no polyuria, no visual change, no weakness and no weight loss. Pertinent negatives for hypoglycemia complications include no blackouts, no hospitalization, no nocturnal hypoglycemia, no required assistance and no required glucagon injection. Symptoms are stable. Pertinent negatives for diabetic complications include no CVA, heart disease, impotence, nephropathy, peripheral neuropathy, PVD or retinopathy. Risk factors for coronary artery disease include dyslipidemia, hypertension and obesity. Current diabetic treatment includes oral agent (triple therapy). He is compliant with treatment all of the time. He is currently taking insulin pre-breakfast and pre-dinner. Insulin injections are given by patient. Rotation sites for injection include the abdominal wall. His weight is stable. When asked about meal planning, he reported none. He has not had a previous visit with a dietitian. He participates in exercise intermittently. He monitors blood glucose at home 1-2 x per day. Blood glucose monitoring compliance is fair. There is no change in his home blood glucose trend. His breakfast blood glucose is taken between 5-6 am. His breakfast blood glucose range is generally 130-140 mg/dl. He does not see a podiatrist.Eye exam is current.     Here for f/u on NIDDM.  -160 and a1c remains elevated.  He admits he is not  "tracking his food intake.  Daughter dx with covid today.  He has some nasal congestion but o/w feeling well.  He is vaccinated.    Objective   Vital Signs:   /80   Pulse 71   Temp 97.1 °F (36.2 °C) (Temporal)   Resp 16   Ht 177.8 cm (70\")   SpO2 98%   BMI 37.31 kg/m²     Physical Exam  Vitals and nursing note reviewed.   Constitutional:       Appearance: Normal appearance. He is well-developed.   HENT:      Head: Normocephalic and atraumatic.      Right Ear: External ear normal.      Left Ear: External ear normal.   Eyes:      Extraocular Movements: Extraocular movements intact.      Conjunctiva/sclera: Conjunctivae normal.   Neck:      Vascular: No carotid bruit.   Cardiovascular:      Rate and Rhythm: Normal rate and regular rhythm.      Heart sounds: Normal heart sounds.      Comments: No bruits  Pulmonary:      Effort: Pulmonary effort is normal. No respiratory distress.      Breath sounds: Normal breath sounds. No wheezing or rales.   Abdominal:      General: Bowel sounds are normal. There is no distension.      Palpations: Abdomen is soft. There is no mass.      Tenderness: There is no abdominal tenderness.   Musculoskeletal:      Cervical back: Neck supple.   Lymphadenopathy:      Cervical: No cervical adenopathy.   Skin:     General: Skin is warm.   Neurological:      General: No focal deficit present.      Mental Status: He is alert and oriented to person, place, and time.   Psychiatric:         Mood and Affect: Mood normal.         Behavior: Behavior normal.         Thought Content: Thought content normal.         Judgment: Judgment normal.        Result Review :            Hemoglobin A1c (01/04/2022 08:10)  T4, Free (01/04/2022 08:10)  TSH (01/04/2022 08:10)  Lipid Panel With LDL / HDL Ratio (01/04/2022 08:10)  Comprehensive Metabolic Panel (01/04/2022 08:10)  CBC & Differential (01/04/2022 08:10)       Assessment and Plan    Diagnoses and all orders for this visit:    1. Exposure to COVID-19 " virus (Primary)  -     COVID-19,[facility] ROUTINE, NP/OP SWAB IN TRANSPORT MEDIA OR ESWAB 72 HR TAT - Swab, Nasopharynx; Future  -     COVID-19,[facility] ROUTINE, NP/OP SWAB IN TRANSPORT MEDIA OR ESWAB 72 HR TAT - Swab, Nasopharynx    2. Type 2 diabetes mellitus without complication, without long-term current use of insulin (HCC)    3. Hyperlipidemia, unspecified hyperlipidemia type    4. Primary hypertension    5. Elevated liver enzymes    Other orders  -     SARS-CoV-2, MAYTE 2 DAY TAT - ,        Follow Up   Return in about 6 months (around 7/12/2022).  Patient was given instructions and counseling regarding his condition or for health maintenance advice. Please see specific information pulled into the AVS if appropriate.       Answers for HPI/ROS submitted by the patient on 1/12/2022  What is the primary reason for your visit?: Diabetes    Check covid PCR today  He is going to increase to 1.8 mg of the victoza daily to improve glycemic control for NIDDM.  He must work on dietary changes and increasing activity.    Continue jardiance and metformin  HTN controlled on lisinopril   GERD on omeprazole  HL on fish oil.  LDL ok.

## 2022-01-14 LAB
LABCORP SARS-COV-2, NAA 2 DAY TAT: NORMAL
SARS-COV-2 RNA RESP QL NAA+PROBE: NOT DETECTED

## 2022-01-28 ENCOUNTER — TELEPHONE (OUTPATIENT)
Dept: FAMILY MEDICINE CLINIC | Facility: CLINIC | Age: 57
End: 2022-01-28

## 2022-01-28 NOTE — TELEPHONE ENCOUNTER
At his recent office visit, we discussed increasing his victoza from 1.2 to 1.8 mg daily to improve FBS.  Has he done this change and if so, is he seeing an improvement in FBS?  If he has made the change, can you make the adjustment to his medication list so that we refill the correct dosage when it is due? thanks

## 2022-02-02 NOTE — TELEPHONE ENCOUNTER
Rx Refill Note  Requested Prescriptions     Pending Prescriptions Disp Refills   • Jardiance 25 MG tablet tablet [Pharmacy Med Name: JARDIANCE 25MG TABLETS] 90 tablet 1     Sig: TAKE 1 TABLET BY MOUTH DAILY      Last office visit with prescribing clinician: 1/12/2022      Next office visit with prescribing clinician: 5/17/2022       {TIP  Please add Last Relevant Lab Date if appropriate: 1/12/22    Ashley Tilley MA  02/02/22, 07:42 EST

## 2022-02-03 RX ORDER — EMPAGLIFLOZIN 25 MG/1
TABLET, FILM COATED ORAL
Qty: 90 TABLET | Refills: 1 | Status: SHIPPED | OUTPATIENT
Start: 2022-02-03 | End: 2022-07-18 | Stop reason: SDUPTHER

## 2022-02-10 RX ORDER — LIRAGLUTIDE 6 MG/ML
INJECTION SUBCUTANEOUS
Qty: 6 ML | Refills: 1 | Status: SHIPPED | OUTPATIENT
Start: 2022-02-10 | End: 2022-04-15

## 2022-02-17 ENCOUNTER — TELEPHONE (OUTPATIENT)
Dept: FAMILY MEDICINE CLINIC | Facility: CLINIC | Age: 57
End: 2022-02-17

## 2022-02-17 RX ORDER — OMEPRAZOLE 20 MG/1
40 CAPSULE, DELAYED RELEASE ORAL DAILY
Qty: 90 CAPSULE | Refills: 1 | Status: SHIPPED | OUTPATIENT
Start: 2022-02-17 | End: 2022-05-03

## 2022-02-17 NOTE — TELEPHONE ENCOUNTER
Caller: Rashid Henderson    Relationship: Self    Best call back number: [phone] (M)    Requested Prescriptions:   omeprazole (priLOSEC) 20 MG capsule     Pharmacy where request should be sent:  [id number] - [address] - [phone]  - [phone] FX        Additional details provided by patient: PATIENT CALLED TO ASK IF DR. NARAYANAN CAN START PRESCRIBING THIS MEDICATION FOR PATIENT. PATIENT STATES THAT HE HAS ENOUGH FOR A 3 DAY SUPPLY.    PLEASE CONTACT PATIENT TO ADVISE.    Does the patient have less than a 3 day supply:  [] Yes  [x] No    Herbert Ruiz Rep   02/17/22 10:22 EST         THANKS

## 2022-03-09 RX ORDER — METFORMIN HYDROCHLORIDE 500 MG/1
TABLET, EXTENDED RELEASE ORAL
Qty: 360 TABLET | Refills: 1 | Status: SHIPPED | OUTPATIENT
Start: 2022-03-09 | End: 2022-07-18 | Stop reason: SDUPTHER

## 2022-03-09 NOTE — TELEPHONE ENCOUNTER
Rx Refill Note  Requested Prescriptions     Pending Prescriptions Disp Refills   • metFORMIN ER (GLUCOPHAGE-XR) 500 MG 24 hr tablet [Pharmacy Med Name: METFORMIN ER 500MG 24HR TABS] 360 tablet 1     Sig: TAKE 2 TABLETS BY MOUTH EVERY MORNING WITH BREAKFAST AND TAKE 2 TABLETS BY MOUTH EVERY EVENING WITH DINNER      Last office visit with prescribing clinician: 1/12/2022      Next office visit with prescribing clinician: 5/17/2022       {TIP  Please add Last Relevant Lab Date if appropriate: 1/4/22    Ashley Tilley MA  03/09/22, 16:53 EST

## 2022-03-28 RX ORDER — VALACYCLOVIR HYDROCHLORIDE 500 MG/1
TABLET, FILM COATED ORAL
Qty: 6 TABLET | Refills: 0 | Status: SHIPPED | OUTPATIENT
Start: 2022-03-28 | End: 2022-10-13

## 2022-03-28 NOTE — TELEPHONE ENCOUNTER
Rx Refill Note  Requested Prescriptions     Pending Prescriptions Disp Refills   • valACYclovir (VALTREX) 500 MG tablet [Pharmacy Med Name: VALACYCLOVIR 500MG TABLETS] 6 tablet 0     Sig: TAKE 1 TABLET BY MOUTH TWICE DAILY FOR 3 DAYS      Last office visit with prescribing clinician: 1/12/2022      Next office visit with prescribing clinician: 5/17/2022            Ashley Tilley MA  03/28/22, 09:00 EDT

## 2022-04-15 RX ORDER — LIRAGLUTIDE 6 MG/ML
INJECTION SUBCUTANEOUS
Qty: 6 ML | Refills: 1 | Status: SHIPPED | OUTPATIENT
Start: 2022-04-15 | End: 2022-06-15

## 2022-04-15 NOTE — TELEPHONE ENCOUNTER
Rx Refill Note  Requested Prescriptions     Pending Prescriptions Disp Refills   • Victoza 18 MG/3ML solution pen-injector injection [Pharmacy Med Name: VICTOZA 18MG/3ML INJ PEN 3ML(2PACK)] 6 mL 1     Sig: ADMINISTER 1.2 MG UNDER THE SKIN EVERY DAY AS DIRECTED      Last office visit with prescribing clinician: 1/12/2022      Next office visit with prescribing clinician: 5/17/2022       {TIP  Please add Last Relevant Lab Date if appropriate: 1/4/22    Ashley Tilley MA  04/15/22, 10:46 EDT

## 2022-05-02 DIAGNOSIS — E78.5 HYPERLIPIDEMIA, UNSPECIFIED HYPERLIPIDEMIA TYPE: ICD-10-CM

## 2022-05-02 DIAGNOSIS — E11.9 TYPE 2 DIABETES MELLITUS WITHOUT COMPLICATION, WITHOUT LONG-TERM CURRENT USE OF INSULIN: ICD-10-CM

## 2022-05-02 DIAGNOSIS — I10 PRIMARY HYPERTENSION: Primary | ICD-10-CM

## 2022-05-03 RX ORDER — OMEPRAZOLE 20 MG/1
40 CAPSULE, DELAYED RELEASE ORAL DAILY
Qty: 90 CAPSULE | Refills: 1 | OUTPATIENT
Start: 2022-05-03

## 2022-05-03 RX ORDER — LISINOPRIL 10 MG/1
10 TABLET ORAL DAILY
Qty: 90 TABLET | Refills: 1 | Status: SHIPPED | OUTPATIENT
Start: 2022-05-03 | End: 2022-10-12

## 2022-05-03 RX ORDER — OMEPRAZOLE 20 MG/1
40 CAPSULE, DELAYED RELEASE ORAL DAILY
Qty: 90 CAPSULE | Refills: 1 | Status: SHIPPED | OUTPATIENT
Start: 2022-05-03 | End: 2022-08-02

## 2022-05-03 NOTE — TELEPHONE ENCOUNTER
Respiratory at bedside for breathing treatment.    Rx Refill Note  Requested Prescriptions     Pending Prescriptions Disp Refills   • lisinopril (PRINIVIL,ZESTRIL) 10 MG tablet [Pharmacy Med Name: LISINOPRIL 10MG TABLETS] 90 tablet 1     Sig: TAKE 1 TABLET BY MOUTH DAILY   • omeprazole (priLOSEC) 20 MG capsule [Pharmacy Med Name: OMEPRAZOLE 20MG CAPSULES] 90 capsule 1     Sig: TAKE 2 CAPSULES BY MOUTH DAILY     Refused Prescriptions Disp Refills   • omeprazole (priLOSEC) 20 MG capsule [Pharmacy Med Name: OMEPRAZOLE 20MG CAPSULES] 90 capsule 1     Sig: TAKE 2 CAPSULES BY MOUTH DAILY     Refused By: ASHLEY SEWELL     Reason for Refusal: Duplicate renewal request      Last office visit with prescribing clinician: 1/12/2022      Next office visit with prescribing clinician: 5/17/2022            Ashley Sewell MA  05/03/22, 08:47 EDT

## 2022-05-17 ENCOUNTER — OFFICE VISIT (OUTPATIENT)
Dept: FAMILY MEDICINE CLINIC | Facility: CLINIC | Age: 57
End: 2022-05-17

## 2022-05-17 VITALS
DIASTOLIC BLOOD PRESSURE: 78 MMHG | HEART RATE: 95 BPM | BODY MASS INDEX: 37.29 KG/M2 | WEIGHT: 260.5 LBS | RESPIRATION RATE: 18 BRPM | HEIGHT: 70 IN | TEMPERATURE: 97.1 F | SYSTOLIC BLOOD PRESSURE: 110 MMHG | OXYGEN SATURATION: 98 %

## 2022-05-17 DIAGNOSIS — E11.9 TYPE 2 DIABETES MELLITUS WITHOUT COMPLICATION, WITHOUT LONG-TERM CURRENT USE OF INSULIN: Primary | ICD-10-CM

## 2022-05-17 DIAGNOSIS — Z23 ENCOUNTER FOR IMMUNIZATION: ICD-10-CM

## 2022-05-17 DIAGNOSIS — E78.5 HYPERLIPIDEMIA, UNSPECIFIED HYPERLIPIDEMIA TYPE: ICD-10-CM

## 2022-05-17 DIAGNOSIS — I10 PRIMARY HYPERTENSION: ICD-10-CM

## 2022-05-17 DIAGNOSIS — R74.8 ELEVATED LIVER ENZYMES: ICD-10-CM

## 2022-05-17 PROCEDURE — 99214 OFFICE O/P EST MOD 30 MIN: CPT | Performed by: INTERNAL MEDICINE

## 2022-05-17 NOTE — PROGRESS NOTES
"Chief Complaint  Hyperlipidemia (4 m f/u)    Subjective          Rashid Henderson presents to [facility]  History of Present Illness  Here for f/u on NIDDM and HL--on metformin ER 500mg 2 tabs bid, victoza 18 mg/3 ml on 18 mg qd and on jardiance 25 mg qd.  Hypertension is controlled on lisinopril.  Hyperlipidemia on no statin at this time.  Objective   Vital Signs:  /78 (BP Location: Left arm, Patient Position: Sitting, Cuff Size: Adult)   Pulse 95   Temp 97.1 °F (36.2 °C) (Temporal)   Resp 18   Ht 177.8 cm (70\")   Wt 118 kg (260 lb 8 oz)   SpO2 98%   BMI 37.38 kg/m²   Class 2 Severe Obesity (BMI >=35 and <=39.9). Obesity-related health conditions include the following: hypertension, diabetes mellitus and dyslipidemias. Obesity is unchanged. BMI is is above average; BMI management plan is completed. We discussed portion control, increasing exercise and Weight Watchers or other Commercial based weight reduction program.    CBC & Differential (05/05/2022 08:17)  Comprehensive Metabolic Panel (05/05/2022 08:17)  Lipid Panel With LDL / HDL Ratio (05/05/2022 08:17)  TSH (05/05/2022 08:17)  T4, Free (05/05/2022 08:17)  MicroAlbumin, Urine, Random - Urine, Clean Catch (05/05/2022 08:17)  Hemoglobin A1c (05/05/2022 08:17)    Physical Exam  Vitals and nursing note reviewed.   Constitutional:       Appearance: Normal appearance. He is well-developed.   HENT:      Head: Normocephalic and atraumatic.      Right Ear: External ear normal.      Left Ear: External ear normal.   Eyes:      Extraocular Movements: Extraocular movements intact.      Conjunctiva/sclera: Conjunctivae normal.   Neck:      Vascular: No carotid bruit.   Cardiovascular:      Rate and Rhythm: Normal rate and regular rhythm.      Heart sounds: Normal heart sounds.      Comments: No bruits  Pulmonary:      Effort: Pulmonary effort is normal. No respiratory distress.      Breath sounds: Normal breath sounds. No wheezing or " rales.   Abdominal:      General: Bowel sounds are normal. There is no distension.      Palpations: Abdomen is soft. There is no mass.      Tenderness: There is no abdominal tenderness.   Musculoskeletal:      Cervical back: Neck supple.   Lymphadenopathy:      Cervical: No cervical adenopathy.   Skin:     General: Skin is warm.   Neurological:      General: No focal deficit present.      Mental Status: He is alert and oriented to person, place, and time.   Psychiatric:         Mood and Affect: Mood normal.         Behavior: Behavior normal.         Thought Content: Thought content normal.         Judgment: Judgment normal.        Result Review :                 Assessment and Plan    Diagnoses and all orders for this visit:    1. Type 2 diabetes mellitus without complication, without long-term current use of insulin (HCC) (Primary)  -     Ambulatory Referral to Endocrinology    2. Hyperlipidemia, unspecified hyperlipidemia type    3. Primary hypertension    4. Elevated liver enzymes    5. Encounter for immunization  -     Cancel: Tdap Vaccine Greater Than or Equal To 8yo IM             Follow Up   No follow-ups on file.  Patient was given instructions and counseling regarding his condition or for health maintenance advice. Please see specific information pulled into the AVS if appropriate.     Patient and I had a good discussion today about his poorly controlled diabetes and now elevated cholesterol.  He also has history of liver enzyme elevation.  He has not had luck with losing weight.  He is not exercising and he recognizes that he needs to make some lifestyle changes.  We had a good discussion that either a diet that is low-carb or weight watchers would be a good first choice.  There is a couple apps that we did discuss that he might use to help tracking foods and calories.  He is also going to get a new glucose monitor as his is old.  He is going to start exercising with his daughter.  I have placed a referral  for endocrinology as it looks like he may need insulin.  For now he will continue current medications.  I would like to see him back in 4 months with labs before hand to see if we have made any progress.  If his cholesterol still elevated at that time, he will need a statin.  Patient left before we were able to give him his tetanus shot today.

## 2022-06-09 PROBLEM — I10 ESSENTIAL HYPERTENSION, BENIGN: Status: ACTIVE | Noted: 2022-06-09

## 2022-06-09 PROBLEM — R13.19 ESOPHAGEAL DYSPHAGIA: Status: RESOLVED | Noted: 2021-02-15 | Resolved: 2022-06-09

## 2022-06-09 PROBLEM — E11.65 TYPE 2 DIABETES MELLITUS WITH HYPERGLYCEMIA: Status: ACTIVE | Noted: 2022-06-09

## 2022-06-09 PROBLEM — E66.01 MORBIDLY OBESE (HCC): Status: RESOLVED | Noted: 2021-01-12 | Resolved: 2022-06-09

## 2022-06-09 PROBLEM — R79.89 ABNORMAL LFTS (LIVER FUNCTION TESTS): Status: ACTIVE | Noted: 2022-06-09

## 2022-06-09 PROBLEM — E78.1 HYPERTRIGLYCERIDEMIA: Status: ACTIVE | Noted: 2022-06-09

## 2022-06-09 PROBLEM — L30.0 NUMMULAR ECZEMA: Status: RESOLVED | Noted: 2018-02-28 | Resolved: 2022-06-09

## 2022-06-14 NOTE — TELEPHONE ENCOUNTER
Rx Refill Note  Requested Prescriptions     Pending Prescriptions Disp Refills   • Victoza 18 MG/3ML solution pen-injector injection [Pharmacy Med Name: VICTOZA 18MG/3ML INJ PEN 3ML(2PACK)] 6 mL 1     Sig: ADMINISTER 1.2 MG UNDER THE SKIN EVERY DAY AS DIRECTED      Last office visit with prescribing clinician: 5/17/2022      Next office visit with prescribing clinician: 9/14/2022            Monique Jimenez MA  06/14/22, 09:41 EDT

## 2022-06-15 RX ORDER — LIRAGLUTIDE 6 MG/ML
INJECTION SUBCUTANEOUS
Qty: 6 ML | Refills: 1 | Status: SHIPPED | OUTPATIENT
Start: 2022-06-15 | End: 2022-07-18 | Stop reason: SDUPTHER

## 2022-06-24 NOTE — TELEPHONE ENCOUNTER
Rx Refill Note  Requested Prescriptions     Pending Prescriptions Disp Refills   • BD Pen Needle [place] 2nd Gen 32G X 4 MM misc [Pharmacy Med Name: B-D [place] 2ND GEN PEN NDL [id number]] 100 each      Sig: USE AS DIRECTED WITH VICTOZA      Last office visit with prescribing clinician: 5/17/2022      Next office visit with prescribing clinician: 9/14/2022            Jamee Oliveira MA  06/24/22, 16:12 EDT

## 2022-06-26 RX ORDER — PEN NEEDLE, DIABETIC 32GX 5/32"
NEEDLE, DISPOSABLE MISCELLANEOUS
Qty: 100 EACH | Refills: 1 | Status: SHIPPED | OUTPATIENT
Start: 2022-06-26 | End: 2022-07-18 | Stop reason: SDUPTHER

## 2022-07-18 ENCOUNTER — OFFICE VISIT (OUTPATIENT)
Dept: ENDOCRINOLOGY | Age: 57
End: 2022-07-18

## 2022-07-18 VITALS
HEART RATE: 83 BPM | DIASTOLIC BLOOD PRESSURE: 72 MMHG | HEIGHT: 70 IN | SYSTOLIC BLOOD PRESSURE: 130 MMHG | OXYGEN SATURATION: 98 % | TEMPERATURE: 97.5 F | BODY MASS INDEX: 36.22 KG/M2 | WEIGHT: 253 LBS

## 2022-07-18 DIAGNOSIS — E11.65 TYPE 2 DIABETES MELLITUS WITH HYPERGLYCEMIA, UNSPECIFIED WHETHER LONG TERM INSULIN USE: Primary | ICD-10-CM

## 2022-07-18 DIAGNOSIS — R79.89 ABNORMAL LFTS (LIVER FUNCTION TESTS): ICD-10-CM

## 2022-07-18 DIAGNOSIS — I10 ESSENTIAL HYPERTENSION, BENIGN: ICD-10-CM

## 2022-07-18 DIAGNOSIS — E78.1 HYPERTRIGLYCERIDEMIA: ICD-10-CM

## 2022-07-18 LAB — GLUCOSE BLDC GLUCOMTR-MCNC: 175 MG/DL (ref 70–130)

## 2022-07-18 PROCEDURE — 82962 GLUCOSE BLOOD TEST: CPT | Performed by: INTERNAL MEDICINE

## 2022-07-18 PROCEDURE — 99204 OFFICE O/P NEW MOD 45 MIN: CPT | Performed by: INTERNAL MEDICINE

## 2022-07-18 RX ORDER — FLURBIPROFEN SODIUM 0.3 MG/ML
SOLUTION/ DROPS OPHTHALMIC
Qty: 100 EACH | Refills: 4 | Status: SHIPPED | OUTPATIENT
Start: 2022-07-18

## 2022-07-18 RX ORDER — EMPAGLIFLOZIN 25 MG/1
TABLET, FILM COATED ORAL
Qty: 90 TABLET | Refills: 1 | OUTPATIENT
Start: 2022-07-18

## 2022-07-18 RX ORDER — LANCETS 30 GAUGE
1 EACH MISCELLANEOUS ONCE
Qty: 100 EACH | Refills: 4 | Status: SHIPPED | OUTPATIENT
Start: 2022-07-18 | End: 2022-07-18

## 2022-07-18 RX ORDER — METFORMIN HYDROCHLORIDE 500 MG/1
TABLET, EXTENDED RELEASE ORAL
Qty: 360 TABLET | Refills: 2 | Status: SHIPPED | OUTPATIENT
Start: 2022-07-18 | End: 2022-11-15 | Stop reason: SDUPTHER

## 2022-07-18 RX ORDER — LIRAGLUTIDE 6 MG/ML
INJECTION SUBCUTANEOUS
Qty: 27 ML | Refills: 2 | Status: SHIPPED | OUTPATIENT
Start: 2022-07-18 | End: 2022-11-15 | Stop reason: SDUPTHER

## 2022-07-18 RX ORDER — FENOFIBRATE 145 MG/1
TABLET, COATED ORAL
Qty: 90 TABLET | Refills: 2 | Status: SHIPPED | OUTPATIENT
Start: 2022-07-18 | End: 2022-11-15 | Stop reason: SDUPTHER

## 2022-07-18 RX ORDER — BLOOD-GLUCOSE METER
1 KIT MISCELLANEOUS ONCE
Qty: 1 KIT | Refills: 0 | Status: SHIPPED | OUTPATIENT
Start: 2022-07-18 | End: 2022-07-18

## 2022-07-18 NOTE — PATIENT INSTRUCTIONS
As discussed changes to medications to improve glucose control. Will await labs obtained today and then add new medication Amaryl as discussed if labs come out good. If not will contact to discuss other options. Also added new medication to address ongoing high triglycerides as likely contributing factor to diabetes.     Labs to be done at least 10 days before return appointment.  If labs are not done within 3 days of scheduled return appointment the appointment will be canceled and rescheduled to a later date with requirement for labs to be done as directed.      Bring med list, meter and/or log data to all appointments.

## 2022-07-19 LAB
BUN SERPL-MCNC: 19 MG/DL (ref 6–24)
BUN/CREAT SERPL: 21 (ref 9–20)
C PEPTIDE SERPL-MCNC: 3 NG/ML (ref 1.1–4.4)
CALCIUM SERPL-MCNC: 9.3 MG/DL (ref 8.7–10.2)
CHLORIDE SERPL-SCNC: 98 MMOL/L (ref 96–106)
CO2 SERPL-SCNC: 21 MMOL/L (ref 20–29)
CREAT SERPL-MCNC: 0.91 MG/DL (ref 0.76–1.27)
EGFRCR SERPLBLD CKD-EPI 2021: 100 ML/MIN/1.73
FRUCTOSAMINE SERPL-SCNC: 280 UMOL/L (ref 0–285)
GLUCOSE SERPL-MCNC: 156 MG/DL (ref 65–99)
INSULIN SERPL-ACNC: 8.5 UIU/ML (ref 2.6–24.9)
POTASSIUM SERPL-SCNC: 4.3 MMOL/L (ref 3.5–5.2)
SODIUM SERPL-SCNC: 136 MMOL/L (ref 134–144)

## 2022-08-01 NOTE — TELEPHONE ENCOUNTER
Rx Refill Note  Requested Prescriptions     Pending Prescriptions Disp Refills   • omeprazole (priLOSEC) 20 MG capsule [Pharmacy Med Name: OMEPRAZOLE 20MG CAPSULES] 90 capsule 1     Sig: TAKE 2 CAPSULES BY MOUTH DAILY      Last office visit with prescribing clinician: 5/17/2022      Next office visit with prescribing clinician: 9/14/2022       {TIP  Please add Last Relevant Lab Date if appropriate: 7/18/22    OLY Ashley  08/01/22, 09:26 EDT

## 2022-08-02 RX ORDER — OMEPRAZOLE 20 MG/1
40 CAPSULE, DELAYED RELEASE ORAL DAILY
Qty: 90 CAPSULE | Refills: 1 | Status: SHIPPED | OUTPATIENT
Start: 2022-08-02 | End: 2022-11-28 | Stop reason: SDUPTHER

## 2022-09-08 ENCOUNTER — LAB (OUTPATIENT)
Dept: LAB | Facility: HOSPITAL | Age: 57
End: 2022-09-08

## 2022-09-08 DIAGNOSIS — E11.65 TYPE 2 DIABETES MELLITUS WITH HYPERGLYCEMIA, UNSPECIFIED WHETHER LONG TERM INSULIN USE: ICD-10-CM

## 2022-09-08 DIAGNOSIS — I10 ESSENTIAL HYPERTENSION, BENIGN: ICD-10-CM

## 2022-09-08 DIAGNOSIS — E78.1 HYPERTRIGLYCERIDEMIA: ICD-10-CM

## 2022-09-08 DIAGNOSIS — R79.89 ABNORMAL LFTS (LIVER FUNCTION TESTS): ICD-10-CM

## 2022-09-08 LAB
ALBUMIN SERPL-MCNC: 4.7 G/DL (ref 3.5–5.2)
ALBUMIN UR-MCNC: <1.2 MG/DL
ALBUMIN/GLOB SERPL: 1.6 G/DL
ALP SERPL-CCNC: 45 U/L (ref 39–117)
ALT SERPL W P-5'-P-CCNC: 21 U/L (ref 1–41)
ANION GAP SERPL CALCULATED.3IONS-SCNC: 13 MMOL/L (ref 5–15)
AST SERPL-CCNC: 21 U/L (ref 1–40)
BILIRUB SERPL-MCNC: 0.4 MG/DL (ref 0–1.2)
BUN SERPL-MCNC: 21 MG/DL (ref 6–20)
BUN/CREAT SERPL: 22.1 (ref 7–25)
CALCIUM SPEC-SCNC: 9.5 MG/DL (ref 8.6–10.5)
CHLORIDE SERPL-SCNC: 98 MMOL/L (ref 98–107)
CHOLEST SERPL-MCNC: 190 MG/DL (ref 0–200)
CO2 SERPL-SCNC: 25 MMOL/L (ref 22–29)
CREAT SERPL-MCNC: 0.95 MG/DL (ref 0.76–1.27)
CREAT UR-MCNC: 89 MG/DL
EGFRCR SERPLBLD CKD-EPI 2021: 95.1 ML/MIN/1.73
GLOBULIN UR ELPH-MCNC: 3 GM/DL
GLUCOSE SERPL-MCNC: 172 MG/DL (ref 65–99)
HBA1C MFR BLD: 7.3 % (ref 4.8–5.6)
HDLC SERPL-MCNC: 77 MG/DL (ref 40–60)
LDLC SERPL CALC-MCNC: 96 MG/DL (ref 0–100)
LDLC/HDLC SERPL: 1.21 {RATIO}
MICROALBUMIN/CREAT UR: NORMAL MG/G{CREAT}
POTASSIUM SERPL-SCNC: 4.6 MMOL/L (ref 3.5–5.2)
PROT SERPL-MCNC: 7.7 G/DL (ref 6–8.5)
SODIUM SERPL-SCNC: 136 MMOL/L (ref 136–145)
TRIGL SERPL-MCNC: 98 MG/DL (ref 0–150)
VLDLC SERPL-MCNC: 17 MG/DL (ref 5–40)

## 2022-09-08 PROCEDURE — 36415 COLL VENOUS BLD VENIPUNCTURE: CPT

## 2022-09-08 PROCEDURE — 82043 UR ALBUMIN QUANTITATIVE: CPT

## 2022-09-08 PROCEDURE — 83036 HEMOGLOBIN GLYCOSYLATED A1C: CPT

## 2022-09-08 PROCEDURE — 80053 COMPREHEN METABOLIC PANEL: CPT

## 2022-09-08 PROCEDURE — 82570 ASSAY OF URINE CREATININE: CPT

## 2022-09-08 PROCEDURE — 80061 LIPID PANEL: CPT

## 2022-09-14 ENCOUNTER — OFFICE VISIT (OUTPATIENT)
Dept: FAMILY MEDICINE CLINIC | Facility: CLINIC | Age: 57
End: 2022-09-14

## 2022-09-14 VITALS
HEIGHT: 70 IN | SYSTOLIC BLOOD PRESSURE: 102 MMHG | HEART RATE: 90 BPM | WEIGHT: 250.9 LBS | BODY MASS INDEX: 35.92 KG/M2 | OXYGEN SATURATION: 99 % | TEMPERATURE: 96.9 F | RESPIRATION RATE: 16 BRPM | DIASTOLIC BLOOD PRESSURE: 68 MMHG

## 2022-09-14 DIAGNOSIS — E78.1 HYPERTRIGLYCERIDEMIA: Primary | ICD-10-CM

## 2022-09-14 DIAGNOSIS — I10 ESSENTIAL HYPERTENSION, BENIGN: ICD-10-CM

## 2022-09-14 DIAGNOSIS — E11.65 TYPE 2 DIABETES MELLITUS WITH HYPERGLYCEMIA, UNSPECIFIED WHETHER LONG TERM INSULIN USE: ICD-10-CM

## 2022-09-14 PROCEDURE — 99214 OFFICE O/P EST MOD 30 MIN: CPT | Performed by: INTERNAL MEDICINE

## 2022-09-14 NOTE — PROGRESS NOTES
Chief Complaint  Annual Exam    Subjective        Rashid Henderson presents to [facility]  Diabetes  He has type 2 diabetes mellitus. No MedicAlert identification noted. The initial diagnosis of diabetes was made 10 years ago. Pertinent negatives for hypoglycemia include no confusion, dizziness, headaches, hunger, mood changes, nervousness/anxiousness, pallor, seizures, sleepiness, speech difficulty, sweats or tremors. Pertinent negatives for diabetes include no blurred vision, no chest pain, no fatigue, no foot paresthesias, no foot ulcerations, no polydipsia, no polyphagia, no polyuria, no visual change, no weakness and no weight loss. Pertinent negatives for hypoglycemia complications include no blackouts, no hospitalization, no nocturnal hypoglycemia, no required assistance and no required glucagon injection. Symptoms are stable. Pertinent negatives for diabetic complications include no CVA, heart disease, impotence, nephropathy, peripheral neuropathy, PVD or retinopathy. Risk factors for coronary artery disease include dyslipidemia, hypertension and obesity. Current diabetic treatment includes oral agent (dual therapy). He is compliant with treatment all of the time. His weight is decreasing steadily. When asked about meal planning, he reported none. He has not had a previous visit with a dietitian. He participates in exercise every other day. He monitors blood glucose at home 3-4 x per week. Blood glucose monitoring compliance is adequate. His home blood glucose trend is fluctuating minimally. His highest blood glucose is >200 mg/dl. His overall blood glucose range is 140-180 mg/dl. He does not see a podiatrist.Eye exam is current.     Here for f/u on NIDDM, HTN on lisinopril 10 mg qd, GERD on omeprazole 20 mg qd and new start of fenofibrate 145 mg qd for HTG through Endocrinology.  He also takes fish oil.  Has cut back on pasta and pizza and has  Lost 10 pounds.  a1c has dropped 1 %  "point.  He is walking more for exercise.  No CP no soa or BLACKMAN  Eye exam is up to date  Got new glasses    Objective   Vital Signs:  /68 (BP Location: Left arm, Patient Position: Sitting, Cuff Size: Large Adult)   Pulse 90   Temp 96.9 °F (36.1 °C) (Temporal)   Resp 16   Ht 177.8 cm (70\")   Wt 114 kg (250 lb 14.4 oz)   SpO2 99%   BMI 36.00 kg/m²   Estimated body mass index is 36 kg/m² as calculated from the following:    Height as of this encounter: 177.8 cm (70\").    Weight as of this encounter: 114 kg (250 lb 14.4 oz).          Physical Exam  Vitals and nursing note reviewed.   Constitutional:       Appearance: Normal appearance. He is well-developed.   HENT:      Head: Normocephalic and atraumatic.      Right Ear: External ear normal.      Left Ear: External ear normal.   Eyes:      Extraocular Movements: Extraocular movements intact.      Conjunctiva/sclera: Conjunctivae normal.   Neck:      Vascular: No carotid bruit.   Cardiovascular:      Rate and Rhythm: Normal rate and regular rhythm.      Heart sounds: Normal heart sounds.      Comments: No bruits  Pulmonary:      Effort: Pulmonary effort is normal. No respiratory distress.      Breath sounds: Normal breath sounds. No wheezing or rales.   Abdominal:      General: Bowel sounds are normal. There is no distension.      Palpations: Abdomen is soft. There is no mass.      Tenderness: There is no abdominal tenderness.   Musculoskeletal:      Cervical back: Neck supple.   Lymphadenopathy:      Cervical: No cervical adenopathy.   Skin:     General: Skin is warm.   Neurological:      General: No focal deficit present.      Mental Status: He is alert and oriented to person, place, and time.   Psychiatric:         Mood and Affect: Mood normal.         Behavior: Behavior normal.         Thought Content: Thought content normal.         Judgment: Judgment normal.      Lipid Panel (09/08/2022 08:05)  Comprehensive Metabolic Panel (09/08/2022 08:05)  Hemoglobin " A1c (09/08/2022 08:05)  Microalbumin / Creatinine Urine Ratio - Urine, Clean Catch (09/08/2022 08:05)    Result Review :                Assessment and Plan   Diagnoses and all orders for this visit:    1. Hypertriglyceridemia (Primary)    2. Essential hypertension, benign    3. Type 2 diabetes mellitus with hyperglycemia, unspecified whether long term insulin use (HCC)    pt is doing much better --labs have improved with his TG normalizing with addition of fenofibrate and his a1c reaching goal with weight loss and dietary changes.  His fasting sugar is still quite high.  Continue victoza and metformin and jardiance  HTG--doing well on fish oil and fenofibrate  HTN--BP low today.  He will monitor it at home and let me know if running consistently less than 110 SBP.  Continue lisinopril 10 mg qd for now.    RTC 6 mo         Follow Up   No follow-ups on file.  Patient was given instructions and counseling regarding his condition or for health maintenance advice. Please see specific information pulled into the AVS if appropriate.

## 2022-10-12 RX ORDER — LISINOPRIL 10 MG/1
10 TABLET ORAL DAILY
Qty: 90 TABLET | Refills: 1 | Status: SHIPPED | OUTPATIENT
Start: 2022-10-12 | End: 2023-03-14 | Stop reason: SDUPTHER

## 2022-10-12 NOTE — TELEPHONE ENCOUNTER
Rx Refill Note  Requested Prescriptions     Pending Prescriptions Disp Refills   • valACYclovir (VALTREX) 500 MG tablet [Pharmacy Med Name: VALACYCLOVIR 500MG TABLETS] 6 tablet 0     Sig: TAKE 1 TABLET BY MOUTH TWICE DAILY FOR 3 DAYS      Last office visit with prescribing clinician: 9/14/2022      Next office visit with prescribing clinician: 3/14/2023            Ashley Tilley MA  10/12/22, 16:24 EDT

## 2022-10-12 NOTE — TELEPHONE ENCOUNTER
Rx Refill Note  Requested Prescriptions     Pending Prescriptions Disp Refills   • lisinopril (PRINIVIL,ZESTRIL) 10 MG tablet [Pharmacy Med Name: LISINOPRIL 10MG TABLETS] 90 tablet 1     Sig: TAKE 1 TABLET BY MOUTH DAILY      Last office visit with prescribing clinician: 9/14/2022      Next office visit with prescribing clinician: 3/14/2023            Ashley Tilley MA  10/12/22, 14:08 EDT

## 2022-10-13 RX ORDER — VALACYCLOVIR HYDROCHLORIDE 500 MG/1
TABLET, FILM COATED ORAL
Qty: 6 TABLET | Refills: 0 | Status: SHIPPED | OUTPATIENT
Start: 2022-10-13 | End: 2023-02-06

## 2022-10-26 ENCOUNTER — TELEPHONE (OUTPATIENT)
Dept: ENDOCRINOLOGY | Age: 57
End: 2022-10-26

## 2022-11-03 ENCOUNTER — TELEPHONE (OUTPATIENT)
Dept: FAMILY MEDICINE CLINIC | Facility: CLINIC | Age: 57
End: 2022-11-03

## 2022-11-03 DIAGNOSIS — E11.9 TYPE 2 DIABETES MELLITUS WITHOUT COMPLICATION, WITHOUT LONG-TERM CURRENT USE OF INSULIN: ICD-10-CM

## 2022-11-03 DIAGNOSIS — I10 ESSENTIAL HYPERTENSION: ICD-10-CM

## 2022-11-03 DIAGNOSIS — E78.5 HYPERLIPIDEMIA, UNSPECIFIED HYPERLIPIDEMIA TYPE: Primary | ICD-10-CM

## 2022-11-15 ENCOUNTER — OFFICE VISIT (OUTPATIENT)
Dept: ENDOCRINOLOGY | Age: 57
End: 2022-11-15

## 2022-11-15 VITALS
TEMPERATURE: 98.1 F | DIASTOLIC BLOOD PRESSURE: 76 MMHG | HEART RATE: 89 BPM | BODY MASS INDEX: 35.79 KG/M2 | HEIGHT: 70 IN | OXYGEN SATURATION: 97 % | WEIGHT: 250 LBS | SYSTOLIC BLOOD PRESSURE: 112 MMHG

## 2022-11-15 DIAGNOSIS — R79.89 ABNORMAL LFTS (LIVER FUNCTION TESTS): ICD-10-CM

## 2022-11-15 DIAGNOSIS — E11.65 TYPE 2 DIABETES MELLITUS WITH HYPERGLYCEMIA, UNSPECIFIED WHETHER LONG TERM INSULIN USE: Primary | ICD-10-CM

## 2022-11-15 DIAGNOSIS — E78.1 HYPERTRIGLYCERIDEMIA: ICD-10-CM

## 2022-11-15 DIAGNOSIS — I10 ESSENTIAL HYPERTENSION, BENIGN: ICD-10-CM

## 2022-11-15 LAB — GLUCOSE BLDC GLUCOMTR-MCNC: 187 MG/DL (ref 70–130)

## 2022-11-15 PROCEDURE — 82962 GLUCOSE BLOOD TEST: CPT | Performed by: INTERNAL MEDICINE

## 2022-11-15 PROCEDURE — 99214 OFFICE O/P EST MOD 30 MIN: CPT | Performed by: INTERNAL MEDICINE

## 2022-11-15 RX ORDER — LIRAGLUTIDE 6 MG/ML
INJECTION SUBCUTANEOUS
Qty: 27 ML | Refills: 2 | Status: SHIPPED | OUTPATIENT
Start: 2022-11-15

## 2022-11-15 RX ORDER — METFORMIN HYDROCHLORIDE 500 MG/1
TABLET, EXTENDED RELEASE ORAL
Qty: 360 TABLET | Refills: 2 | Status: SHIPPED | OUTPATIENT
Start: 2022-11-15

## 2022-11-15 RX ORDER — GLIMEPIRIDE 2 MG/1
TABLET ORAL
Qty: 180 TABLET | Refills: 2 | Status: SHIPPED | OUTPATIENT
Start: 2022-11-15

## 2022-11-15 RX ORDER — FENOFIBRATE 145 MG/1
TABLET, COATED ORAL
Qty: 90 TABLET | Refills: 2 | Status: SHIPPED | OUTPATIENT
Start: 2022-11-15

## 2022-11-15 NOTE — PROGRESS NOTES
Rashid Henderson, 57 y.o., Gender: male    Assessment/Plan    1.  Pt with DM Type 2 uncontrolled w/o complications.  A1c 7.3% 9/22, estimated 7-7.5% 7/22, 8.2% 5/22 and note has been >8% for some time.  Counseled that conservative goal A1c is to be <7 % and aggressive 6.5 %.  Counseled pt that overall risk with diabetes is related to long term complications of both small and large blood vessels and that the risk for CAD, MI, and stroke is much higher than general population.  Counseled on lifestyle change as a key part of this process to improve all parameters related to diabetes.  Pt counseled on how to do freq bg checks with fasting AM, before meals, before bed, occasionally 2 hours after a meal, and at 2-3 am if awake.  Pt is not checking enough as directed based on current treatment plan to check 0-2 times a day.  Pt did labs at the wrong time so have no current labs for the DM.  Rec Metformin XR at 1000 mg twice daily after AM and PM meals as that is optimal dose.  Rec cont Jardiance 25 mg.  Rec cont Victoza 1.8 mg daily.  There are two other non insulin items to consider.  Actos and Amaryl were discussed at last visit.  Rec starting Amaryl at 2 mg twice daily as better and safer agent in this class of tx.  Gave parameter if glucose remains >150 mg/dL after a month of use of Amaryl to call office and will inc to 4 mg dose.  Labs after last visit showed limited pancreatic reserve meaning insulin will be needed at some point in the future.  Changes to tx today will sort this out for good as if control not improved come next labs in early Jan then at least basal insulin would be needed and pt is aware of this and agrees.  Will have f/u labs before pt returns.  Counseled must keep log and bring to apts to cont tx plan.  Will work to optimize treatment plan in coming months and get back to PCP.  2.  Hypertension goal of <140/90.  Pt on tx for renal protection.  Last microalbumin/creatinine ratio urine check 9/22 was  normal.   3.  Hyperlipidemia LDL goal <100 and trig goal <150.  Rec cont Fenofibrate as trigs did improve after this was started.  Will have f/u labs.          Time Counseled: 15  Minutes  Total Time: 20  Minutes    Return to office in:   2-3  Months      Random Glucose: 187 mg/dL      HPI Summary    Pt is here for evaluation of DM reported to be Type 2.  Pt reports DM since '10.  Pt reports blood glucoses are 140-250 with record.  Pt other c/o unchanged.  Pt reports weight has been stable.  Pt has no report of fatigue.  Pt has no complaint of general muscle weakness.  Pt denies any increased thirst or urination.  Pt denies any chest pain.  Pt denies any shortness of breath.  Pt denies any abdominal complaints.  Pt denies any early satiety or postprandial bloating.  Pt has some blurry vision awaiting new glasses.  Pt reports last seen by eye doctor 4/22 no retinopathy.  Pt reports no problems with feet.  Pt denies any skin wounds.  Pt has no report of depression.  Pt reports good sleep quality with known apnea symptoms has cpap.  Pt reports is now trying to follow a diabetic type diet and limited exercise.  Pt was to have diet education but that didn't get processed so ordered it again.  Pt reports never having pneumonia vaccine in the past and did get flu shot this last season.  Pt without any other complaints related to diabetes at this time.    Review of Systems  see HPI      Patient History    Past History (reviewed):    Medical:   Past Medical History:   Diagnosis Date   • Abnormal electrocardiogram 01/19/2016   • Abnormal LFTs (liver function tests)     suspected fatty liver dz   • Eczema 01/19/2016   • Esophageal stricture    • Essential hypertension, benign    • GERD (gastroesophageal reflux disease)    • Hypertriglyceridemia    • Obesity    • KATTY (obstructive sleep apnea)    • Type 2 diabetes mellitus with hyperglycemia (HCC) 2010       Surgery:   Past Surgical History:   Procedure Laterality Date   •  COLONOSCOPY  approx 2019    benign polyps per pt    • ENDOSCOPY N/A 2/15/2021    Procedure: ESOPHAGOGASTRODUODENOSCOPY WITH REMOVAL OF FOOD BOLUS;  Surgeon: Santo Grant MD;  Location: [facility] MAIN OR;  Service: Gastroenterology;  Laterality: N/A;   • ENDOSCOPY N/A 3/24/2021    Procedure: ESOPHAGOGASTRODUODENOSCOPY WITH 15-18MM BALLOON DILATATION AND BIOPSIES;  Surgeon: Santo Grant MD;  Location: [facility] ENDOSCOPY;  Service: Gastroenterology;  Laterality: N/A;  PRE- ESOPHAGEAL STRICTURE  POST- HIATAL HERNIA, ESOPHAGEAL STRICTURE, GASTRITIS   • LASIK     • VASECTOMY           Social History (reviewed):  Smoking,  ETOH,  Drugs, , Occupation     Medication List:  Current medications were reviewed.    Allergies:    Allergies   Allergen Reactions   • No Known Drug Allergy        Physical Exam    VITALS:    Vitals:    11/15/22 0928   BP: 112/76   Pulse: 89   Temp: 98.1 °F (36.7 °C)   SpO2: 97%     Body mass index is 35.87 kg/m².    GENERAL:  Looks stated age, Sig obese  HEAD/EYES:  N/C, A/T, Mask in place  EXTREMITIES:  FROM  CNS:  A&Ox3    Full exam not done today.      Labs/Imaging  All available lab and imaging data were reviewed.        Guanako Gonzalez MD, RYAN, FACE, ECNU  11/15/2022  09:56 EST

## 2022-11-15 NOTE — PATIENT INSTRUCTIONS
As discussed the lipid problem improved with used of medication.  The diabetes cannot be checked again till after Dec 8th as prior labs were done to early.      Will now add new medication Glimepiride 2 mg twice daily to start and if you see glucose continue to be >150 mg/dL after a month of use call office and will increase to 4 mg dose.      Labs to be done at least 10 days before return appointment.  If labs are not done within 3 days of scheduled return appointment the appointment will be canceled and rescheduled to a later date with requirement for labs to be done as directed.      Bring med list, meter and/or log data to all appointments.

## 2022-11-30 RX ORDER — OMEPRAZOLE 20 MG/1
40 CAPSULE, DELAYED RELEASE ORAL DAILY
Qty: 180 CAPSULE | Refills: 1 | Status: SHIPPED | OUTPATIENT
Start: 2022-11-30

## 2022-11-30 NOTE — TELEPHONE ENCOUNTER
Rx Refill Note  Requested Prescriptions     Pending Prescriptions Disp Refills   • omeprazole (priLOSEC) 20 MG capsule 90 capsule 1     Sig: Take 2 capsules by mouth Daily.      Last office visit with prescribing clinician: 9/14/2022   Last telemedicine visit with prescribing clinician: 3/8/2023   Next office visit with prescribing clinician: 3/14/2023                         Would you like a call back once the refill request has been completed: [] Yes [x] No    If the office needs to give you a call back, can they leave a voicemail: [] Yes [x] No    Ashley Tilley MA  11/30/22, 14:18 EST

## 2022-12-08 ENCOUNTER — APPOINTMENT (OUTPATIENT)
Dept: DIABETES SERVICES | Facility: HOSPITAL | Age: 57
End: 2022-12-08

## 2022-12-08 ENCOUNTER — HOSPITAL ENCOUNTER (OUTPATIENT)
Dept: DIABETES SERVICES | Facility: HOSPITAL | Age: 57
Discharge: HOME | End: 2022-12-08
Admitting: INTERNAL MEDICINE

## 2022-12-08 PROCEDURE — 97802 MEDICAL NUTRITION INDIV IN: CPT

## 2022-12-08 NOTE — CONSULTS
Mr. Henderson was seen today by Registered Dietitian for Diabetes diet education. Consistent with the ADA’s standards of care, a comprehensive assessment/training record has been sent to medical records (see media tab).    KATTY with CPAP. Started on amaryl recently, has seen improvement with blood sugar control over last couple weeks. Been more consistent with checking blood sugar every morning. Was ~290# ~6 years ago. Tries to walk daily. Encouraged 150 min activity/week and strength exercises. Was 290Hx of keto diet under 20 g net carb daily. Discussed 45 g consistent carb diet pattern, ADA plate, Dietary guidelines, portions, planning.     Mr. Henderson has been encouraged to call our office with questions or additional education needs. Please place referral for additional services or follow-up should need arise.    Thank you for the referral.

## 2023-01-18 NOTE — PROGRESS NOTES
"Chief Complaint  Diabetes (Type2: patient has meter with him today, he has no hx of retinopathy or neuropathy )    Subjective        Rashid Henderson presents to [facility]  History of Present Illness     Per dr pond LOV:  \"Rec Metformin XR at 1000 mg twice daily after AM and PM meals as that is optimal dose.  Rec cont Jardiance 25 mg.  Rec cont Victoza 1.8 mg daily.  There are two other non insulin items to consider.  Actos and Amaryl were discussed at last visit.  Rec starting Amaryl at 2 mg twice daily as better and safer agent in this class of tx.  Gave parameter if glucose remains >150 mg/dL after a month of use of Amaryl to call office and will inc to 4 mg dose.  Labs after last visit showed limited pancreatic reserve meaning insulin will be needed at some point in the future.\"    Lab Results   Component Value Date    HGBA1C 7.30 (H) 09/08/2022       Diabetes Type  Years with diabetes: 10+ years    CVA- denies   Retinopathy- denies   Yearly eye exams: yes   Thyroid disorder- denies   CAD- denies   Lung disorder- denies   Gastroparesis- denies   Pancreatitis- denies    complications- denies   Neuropathy- denies    Last diabetic foot exam: denies   Activity level: walking   Last diabetes education: saw dietician in the past   Stress level: managed   Sleep: stable, + cpap    Dentist: regularly    Recent hospitalization: denies      Hypoglycemia: denies   Verbalizes s/s   Orange juice, candy     Hyperglycemia s/s: none     Checking BS QAM- <150  Current diabetic regimen: jardiance 25mg daily, glimepiride 2mg BIDAC, Victoza 1.8mg daily ( full 3 month supply), metformin 1000mg BID     Not currently on statin       Lab Results   Component Value Date    CHOL 190 09/08/2022    CHLPL 222 (H) 05/05/2022    TRIG 98 09/08/2022    HDL 77 (H) 09/08/2022    LDL 96 09/08/2022        Objective   Vital Signs:  /80   Pulse 98   Temp 96.8 °F (36 °C) (Temporal)   Ht 177.8 cm (70\")   Wt 115 " "kg (253 lb 6.4 oz)   SpO2 97%   BMI 36.36 kg/m²   Estimated body mass index is 36.36 kg/m² as calculated from the following:    Height as of this encounter: 177.8 cm (70\").    Weight as of this encounter: 115 kg (253 lb 6.4 oz).             Physical Exam  Vitals reviewed.   Constitutional:       General: He is not in acute distress.  HENT:      Head: Normocephalic and atraumatic.   Eyes:      General:         Right eye: No discharge.         Left eye: No discharge.   Pulmonary:      Effort: Pulmonary effort is normal. No respiratory distress.   Musculoskeletal:         General: No signs of injury. Normal range of motion.      Cervical back: Normal range of motion and neck supple.   Skin:     General: Skin is warm and dry.   Neurological:      Mental Status: He is alert and oriented to person, place, and time. Mental status is at baseline.   Psychiatric:         Mood and Affect: Mood normal.         Behavior: Behavior normal.         Thought Content: Thought content normal.         Judgment: Judgment normal.        Result Review :  The following data was reviewed by: KERRI Olmos on 01/19/2023:  Common labs    Common Labs 5/5/22 5/5/22 5/5/22 5/5/22 5/5/22 7/18/22 9/8/22 9/8/22 9/8/22 9/8/22    0817 0817 0817 0817 0817  0805 0805 0805 0805   Glucose  178 (A)    156 (A) 172 (A)      BUN  18    19 21 (A)      Creatinine  0.90    0.91 0.95      Sodium  137    136 136      Potassium  4.8    4.3 4.6      Chloride  100    98 98      Calcium  9.6    9.3 9.5      Total Protein  7.4           Albumin  4.6     4.70      Total Bilirubin  0.4     0.4      Alkaline Phosphatase  51     45      AST (SGOT)  20     21      ALT (SGPT)  33     21      WBC 6.6            Hemoglobin 16.0            Hematocrit 47.8            Platelets 231            Total Cholesterol        190     Total Cholesterol   222 (A)          Triglycerides   271 (A)     98     HDL Cholesterol   81     77 (A)     LDL Cholesterol    96     96   "   Hemoglobin A1C     8.2 (A)    7.30 (A)    Microalbumin, Urine    5.1      <1.2   (A) Abnormal value       Comments are available for some flowsheets but are not being displayed.                        Assessment and Plan   Diagnoses and all orders for this visit:    1. Type 2 diabetes mellitus with hyperglycemia, unspecified whether long term insulin use (HCC) (Primary)  -     Hemoglobin A1c  -     Comprehensive Metabolic Panel             Follow Up   Return in about 4 months (around 5/19/2023).     Labs today  Adjust regimen as needed based on labs   No additional changes made today    Patient was given instructions and counseling regarding his condition or for health maintenance advice. Please see specific information pulled into the AVS if appropriate.     KERRI Olmos

## 2023-01-19 ENCOUNTER — OFFICE VISIT (OUTPATIENT)
Dept: ENDOCRINOLOGY | Age: 58
End: 2023-01-19
Payer: COMMERCIAL

## 2023-01-19 VITALS
SYSTOLIC BLOOD PRESSURE: 120 MMHG | WEIGHT: 253.4 LBS | BODY MASS INDEX: 36.28 KG/M2 | DIASTOLIC BLOOD PRESSURE: 80 MMHG | OXYGEN SATURATION: 97 % | HEIGHT: 70 IN | HEART RATE: 98 BPM | TEMPERATURE: 96.8 F

## 2023-01-19 DIAGNOSIS — E11.65 TYPE 2 DIABETES MELLITUS WITH HYPERGLYCEMIA, UNSPECIFIED WHETHER LONG TERM INSULIN USE: Primary | ICD-10-CM

## 2023-01-19 PROCEDURE — 99213 OFFICE O/P EST LOW 20 MIN: CPT | Performed by: NURSE PRACTITIONER

## 2023-01-20 LAB
ALBUMIN SERPL-MCNC: 4.3 G/DL (ref 3.5–5.2)
ALBUMIN/GLOB SERPL: 1.7 G/DL
ALP SERPL-CCNC: 45 U/L (ref 39–117)
ALT SERPL-CCNC: 21 U/L (ref 1–41)
AST SERPL-CCNC: 16 U/L (ref 1–40)
BILIRUB SERPL-MCNC: 0.3 MG/DL (ref 0–1.2)
BUN SERPL-MCNC: 19 MG/DL (ref 6–20)
BUN/CREAT SERPL: 15 (ref 7–25)
CALCIUM SERPL-MCNC: 9.2 MG/DL (ref 8.6–10.5)
CHLORIDE SERPL-SCNC: 100 MMOL/L (ref 98–107)
CO2 SERPL-SCNC: 24 MMOL/L (ref 22–29)
CREAT SERPL-MCNC: 1.27 MG/DL (ref 0.76–1.27)
EGFRCR SERPLBLD CKD-EPI 2021: 66.7 ML/MIN/1.73
GLOBULIN SER CALC-MCNC: 2.6 GM/DL
GLUCOSE SERPL-MCNC: 174 MG/DL (ref 65–99)
HBA1C MFR BLD: 6.6 % (ref 4.8–5.6)
POTASSIUM SERPL-SCNC: 4.2 MMOL/L (ref 3.5–5.2)
PROT SERPL-MCNC: 6.9 G/DL (ref 6–8.5)
SODIUM SERPL-SCNC: 138 MMOL/L (ref 136–145)

## 2023-02-06 RX ORDER — VALACYCLOVIR HYDROCHLORIDE 500 MG/1
TABLET, FILM COATED ORAL
Qty: 6 TABLET | Refills: 0 | Status: SHIPPED | OUTPATIENT
Start: 2023-02-06 | End: 2023-03-14 | Stop reason: SDUPTHER

## 2023-02-06 NOTE — TELEPHONE ENCOUNTER
Rx Refill Note  Requested Prescriptions     Pending Prescriptions Disp Refills   • valACYclovir (VALTREX) 500 MG tablet [Pharmacy Med Name: VALACYCLOVIR 500MG TABLETS] 6 tablet 0     Sig: TAKE 1 TABLET BY MOUTH TWICE DAILY FOR 3 DAYS      Last office visit with prescribing clinician: 9/14/2022   Last telemedicine visit with prescribing clinician: 3/8/2023   Next office visit with prescribing clinician: 3/14/2023                         Would you like a call back once the refill request has been completed: [] Yes [] No    If the office needs to give you a call back, can they leave a voicemail: [] Yes [] No    Ashley Tilley MA  02/06/23, 08:29 EST

## 2023-03-14 ENCOUNTER — OFFICE VISIT (OUTPATIENT)
Dept: FAMILY MEDICINE CLINIC | Facility: CLINIC | Age: 58
End: 2023-03-14
Payer: COMMERCIAL

## 2023-03-14 VITALS
BODY MASS INDEX: 36.22 KG/M2 | HEART RATE: 62 BPM | WEIGHT: 253 LBS | SYSTOLIC BLOOD PRESSURE: 142 MMHG | HEIGHT: 70 IN | DIASTOLIC BLOOD PRESSURE: 68 MMHG | RESPIRATION RATE: 16 BRPM | TEMPERATURE: 96.9 F | OXYGEN SATURATION: 96 %

## 2023-03-14 DIAGNOSIS — E11.9 TYPE 2 DIABETES MELLITUS WITHOUT COMPLICATION, WITHOUT LONG-TERM CURRENT USE OF INSULIN: ICD-10-CM

## 2023-03-14 DIAGNOSIS — E78.1 HYPERTRIGLYCERIDEMIA: ICD-10-CM

## 2023-03-14 DIAGNOSIS — I10 ESSENTIAL HYPERTENSION: ICD-10-CM

## 2023-03-14 DIAGNOSIS — Z86.19 H/O COLD SORES: ICD-10-CM

## 2023-03-14 DIAGNOSIS — E78.5 HYPERLIPIDEMIA, UNSPECIFIED HYPERLIPIDEMIA TYPE: Primary | ICD-10-CM

## 2023-03-14 PROCEDURE — 99214 OFFICE O/P EST MOD 30 MIN: CPT | Performed by: INTERNAL MEDICINE

## 2023-03-14 RX ORDER — VALACYCLOVIR HYDROCHLORIDE 500 MG/1
500 TABLET, FILM COATED ORAL 2 TIMES DAILY
Qty: 6 TABLET | Refills: 0 | Status: SHIPPED | OUTPATIENT
Start: 2023-03-14

## 2023-03-14 RX ORDER — LISINOPRIL 10 MG/1
10 TABLET ORAL DAILY
Qty: 90 TABLET | Refills: 1 | Status: SHIPPED | OUTPATIENT
Start: 2023-03-14

## 2023-03-14 NOTE — PROGRESS NOTES
Chief Complaint  Diabetes    Subjective        Rashid Henderson presents to [facility]  Diabetes  He has type 2 diabetes mellitus. No MedicAlert identification noted. The initial diagnosis of diabetes was made 12 Years ago. Pertinent negatives for hypoglycemia include no confusion, dizziness, headaches, hunger, mood changes, nervousness/anxiousness, pallor, seizures, sleepiness, speech difficulty or tremors. Pertinent negatives for diabetes include no blurred vision, no chest pain, no fatigue, no foot paresthesias, no foot ulcerations, no polydipsia, no polyphagia, no polyuria, no visual change, no weakness and no weight loss. Pertinent negatives for hypoglycemia complications include no blackouts, no hospitalization, no nocturnal hypoglycemia, no required assistance and no required glucagon injection. Symptoms are stable. Pertinent negatives for diabetic complications include no CVA, heart disease, impotence, nephropathy, peripheral neuropathy, PVD or retinopathy. Risk factors for coronary artery disease include hypertension and obesity. Current diabetic treatment includes diet and oral agent (triple therapy). He is compliant with treatment most of the time. His weight is stable. He monitors blood glucose at home 1-2 x per day. Blood glucose monitoring compliance is good. There is no change in his home blood glucose trend. His breakfast blood glucose is taken between 5-6 am. His breakfast blood glucose range is generally 110-130 mg/dl. He does not see a podiatrist.Eye exam is current.     Here for f/u on NIDDM on jardiance, metformin, amaryl and victoza.  FBS  in the mornings.  No weight loss. He saw endo at [facility] but his endo doc was a traveling endo and has left.  Wondering if he can stay with me instead of seeing 2 places.   Dad with type 2 DM    HTN is usually well controlled on lisinopril 10 mg qd but today it is slightly high--has had a bad day at work and mom is sick so he is  "concerned about her.    Takes prn valtrex for cold sore breakthroughs and wants a refill to have on hand.    Answers for HPI/ROS submitted by the patient on 3/7/2023  What is the primary reason for your visit?: Diabetes      Objective   Vital Signs:  /68 (BP Location: Left arm, Patient Position: Sitting, Cuff Size: Small Adult)   Pulse 62   Temp 96.9 °F (36.1 °C) (Temporal)   Resp 16   Ht 177.8 cm (70\")   Wt 115 kg (253 lb)   SpO2 96%   BMI 36.30 kg/m²   Estimated body mass index is 36.3 kg/m² as calculated from the following:    Height as of this encounter: 177.8 cm (70\").    Weight as of this encounter: 115 kg (253 lb).         Hemoglobin A1c (03/08/2023 08:26)  T4, Free (03/08/2023 08:26)  TSH (03/08/2023 08:26)  Lipid Panel With LDL / HDL Ratio (03/08/2023 08:26)  Comprehensive Metabolic Panel (03/08/2023 08:26)  CBC & Differential (03/08/2023 08:26)  Comprehensive Metabolic Panel (01/19/2023 13:10)  Hemoglobin A1c (01/19/2023 13:10)      Physical Exam  Vitals and nursing note reviewed.   Constitutional:       Appearance: Normal appearance. He is well-developed.   HENT:      Head: Normocephalic and atraumatic.      Right Ear: External ear normal.      Left Ear: External ear normal.   Eyes:      Extraocular Movements: Extraocular movements intact.      Conjunctiva/sclera: Conjunctivae normal.   Neck:      Vascular: No carotid bruit.   Cardiovascular:      Rate and Rhythm: Normal rate and regular rhythm.      Heart sounds: Normal heart sounds.      Comments: No bruits  Pulmonary:      Effort: Pulmonary effort is normal. No respiratory distress.      Breath sounds: Normal breath sounds. No stridor. No wheezing, rhonchi or rales.   Chest:      Chest wall: No tenderness.   Abdominal:      General: Bowel sounds are normal. There is no distension.      Palpations: Abdomen is soft. There is no mass.      Tenderness: There is no abdominal tenderness. There is no guarding or rebound.      Hernia: No hernia is " present.   Musculoskeletal:      Cervical back: Neck supple.   Lymphadenopathy:      Cervical: No cervical adenopathy.   Skin:     General: Skin is warm.   Neurological:      Mental Status: He is alert and oriented to person, place, and time. Mental status is at baseline.   Psychiatric:         Mood and Affect: Mood normal.         Behavior: Behavior normal.         Thought Content: Thought content normal.         Judgment: Judgment normal.        Result Review :                   Assessment and Plan   Diagnoses and all orders for this visit:    1. Hyperlipidemia, unspecified hyperlipidemia type (Primary)  -     Comprehensive Metabolic Panel; Future  -     Hemoglobin A1c; Future  -     Lipid Panel With LDL / HDL Ratio; Future  -     TSH; Future    2. Essential hypertension  -     Comprehensive Metabolic Panel; Future  -     Hemoglobin A1c; Future  -     Lipid Panel With LDL / HDL Ratio; Future  -     TSH; Future    3. Type 2 diabetes mellitus without complication, without long-term current use of insulin (HCC)  -     Comprehensive Metabolic Panel; Future  -     Hemoglobin A1c; Future  -     Lipid Panel With LDL / HDL Ratio; Future  -     TSH; Future    4. Hypertriglyceridemia  -     Comprehensive Metabolic Panel; Future  -     Hemoglobin A1c; Future  -     Lipid Panel With LDL / HDL Ratio; Future  -     TSH; Future    5. H/O cold sores    Other orders  -     lisinopril (PRINIVIL,ZESTRIL) 10 MG tablet; Take 1 tablet by mouth Daily.  Dispense: 90 tablet; Refill: 1  -     valACYclovir (VALTREX) 500 MG tablet; Take 1 tablet by mouth 2 (Two) Times a Day. for 3 days  Dispense: 6 tablet; Refill: 0    Patient's type 2 diabetes is very well controlled now on the combination of Victoza, metformin, Amaryl and Jardiance.  I have encouraged him to work on dietary changes and weight loss as well as exercise to help prevent changes in diabetes.  He will continue fenofibrate 145 mg daily to help control triglycerides.  His blood  pressure is mildly elevated today which is new for him.  I have asked him to monitor his blood pressure at home and to report back to me if it is consistently greater than 135/85.  He will continue the lisinopril 10 mg daily which I refilled today.  I also refilled Valtrex for him to use as needed due to a cold sore outbreak.  He continues to take omeprazole 40 mg daily for GERD which is controlled.  I will see him back in 4 months or sooner if needed         Follow Up   No follow-ups on file.  Patient was given instructions and counseling regarding his condition or for health maintenance advice. Please see specific information pulled into the AVS if appropriate.

## 2023-04-17 RX ORDER — VALACYCLOVIR HYDROCHLORIDE 500 MG/1
TABLET, FILM COATED ORAL
Qty: 6 TABLET | Refills: 0 | Status: SHIPPED | OUTPATIENT
Start: 2023-04-17

## 2023-04-17 NOTE — TELEPHONE ENCOUNTER
Rx Refill Note  Requested Prescriptions     Pending Prescriptions Disp Refills   • valACYclovir (VALTREX) 500 MG tablet [Pharmacy Med Name: VALACYCLOVIR 500MG TABLETS] 6 tablet 0     Sig: TAKE 1 TABLET BY MOUTH TWICE DAILY FOR 3 DAYS      Last office visit with prescribing clinician: 3/14/2023   Last telemedicine visit with prescribing clinician: 7/13/2023   Next office visit with prescribing clinician: 7/26/2023                         Would you like a call back once the refill request has been completed: [] Yes [] No    If the office needs to give you a call back, can they leave a voicemail: [] Yes [] No    Ana Herzog MA  04/17/23, 15:22 EDT

## 2023-06-05 RX ORDER — OMEPRAZOLE 20 MG/1
40 CAPSULE, DELAYED RELEASE ORAL DAILY
Qty: 180 CAPSULE | Refills: 1 | Status: SHIPPED | OUTPATIENT
Start: 2023-06-05

## 2023-07-26 ENCOUNTER — OFFICE VISIT (OUTPATIENT)
Dept: FAMILY MEDICINE CLINIC | Facility: CLINIC | Age: 58
End: 2023-07-26
Payer: COMMERCIAL

## 2023-07-26 VITALS
DIASTOLIC BLOOD PRESSURE: 78 MMHG | WEIGHT: 253 LBS | HEIGHT: 70 IN | BODY MASS INDEX: 36.22 KG/M2 | SYSTOLIC BLOOD PRESSURE: 128 MMHG | HEART RATE: 81 BPM | OXYGEN SATURATION: 96 %

## 2023-07-26 DIAGNOSIS — J06.9 UPPER RESPIRATORY TRACT INFECTION, UNSPECIFIED TYPE: ICD-10-CM

## 2023-07-26 DIAGNOSIS — R05.9 COUGH: ICD-10-CM

## 2023-07-26 DIAGNOSIS — R09.81 NASAL CONGESTION: ICD-10-CM

## 2023-07-26 DIAGNOSIS — E78.5 HYPERLIPIDEMIA, UNSPECIFIED HYPERLIPIDEMIA TYPE: ICD-10-CM

## 2023-07-26 DIAGNOSIS — I10 ESSENTIAL HYPERTENSION: Primary | ICD-10-CM

## 2023-07-26 DIAGNOSIS — E11.9 TYPE 2 DIABETES MELLITUS WITHOUT COMPLICATION, WITHOUT LONG-TERM CURRENT USE OF INSULIN: ICD-10-CM

## 2023-07-26 RX ORDER — AZITHROMYCIN 250 MG/1
TABLET, FILM COATED ORAL
Qty: 6 TABLET | Refills: 0 | Status: SHIPPED | OUTPATIENT
Start: 2023-07-26

## 2023-07-26 RX ORDER — ALBUTEROL SULFATE 90 UG/1
2 AEROSOL, METERED RESPIRATORY (INHALATION) EVERY 4 HOURS PRN
Qty: 18 G | Refills: 3 | Status: SHIPPED | OUTPATIENT
Start: 2023-07-26

## 2023-07-26 NOTE — PROGRESS NOTES
"Chief Complaint  Hyperlipidemia and Diabetes    Subjective        Rashid Henderson presents to [facility]  Hyperlipidemia    Diabetes    Here for f/u on NIDDM, HTN and HL.  He has had nasal congestion using flonase.  Some mild PND.  No nasal congestion.  C/o dry cough with occasional phlegm which is clear.  No fever.  Using mucinex.  Taking albuterol prn.  Tested negative for covid when sx started.  Patient is doing well with his diabetes.  He had a good eye exam with his ophthalmologist.  Cholesterol is controlled with Tricor 145 mg daily and fish oil.  Blood pressure controlled with lisinopril 10 mg daily.  For diabetes he takes Jardiance 25 mg daily, Amaryl 2 mg twice a day, Victoza 1.8 mg daily and metformin.  Acid reflux is controlled with omeprazole 20 mg daily.Answers submitted by the patient for this visit:  Primary Reason for Visit (Submitted on 7/25/2023)  What is the primary reason for your visit?: Diabetes          Answers submitted by the patient for this visit:  Primary Reason for Visit (Submitted on 7/25/2023)  What is the primary reason for your visit?: Diabetes    Objective   Vital Signs:  /78 (BP Location: Left arm, Patient Position: Sitting, Cuff Size: Adult)   Pulse 81   Ht 177.8 cm (70\")   Wt 115 kg (253 lb)   SpO2 96%   BMI 36.30 kg/m²   Estimated body mass index is 36.3 kg/m² as calculated from the following:    Height as of this encounter: 177.8 cm (70\").    Weight as of this encounter: 115 kg (253 lb).           Comprehensive Metabolic Panel (07/13/2023 08:50)  Hemoglobin A1c (07/13/2023 08:50)  Lipid Panel With LDL / HDL Ratio (07/13/2023 08:50)  TSH (07/13/2023 08:50)  Physical Exam  Vitals and nursing note reviewed.   Constitutional:       Appearance: Normal appearance. He is well-developed.   HENT:      Head: Normocephalic and atraumatic.      Right Ear: Tympanic membrane, ear canal and external ear normal.      Left Ear: Tympanic membrane, ear canal " and external ear normal.      Mouth/Throat:      Mouth: Mucous membranes are moist.   Eyes:      Extraocular Movements: Extraocular movements intact.      Conjunctiva/sclera: Conjunctivae normal.   Neck:      Vascular: No carotid bruit.   Cardiovascular:      Rate and Rhythm: Normal rate and regular rhythm.      Pulses:           Dorsalis pedis pulses are 2+ on the right side and 2+ on the left side.        Posterior tibial pulses are 2+ on the right side and 2+ on the left side.      Heart sounds: Normal heart sounds.      Comments: No bruits  Pulmonary:      Effort: Pulmonary effort is normal. No respiratory distress.      Breath sounds: Normal breath sounds. No stridor. No wheezing, rhonchi or rales.   Chest:      Chest wall: No tenderness.   Abdominal:      General: Bowel sounds are normal. There is no distension.      Palpations: Abdomen is soft. There is no mass.      Tenderness: There is no abdominal tenderness. There is no guarding or rebound.      Hernia: No hernia is present.   Musculoskeletal:      Cervical back: Neck supple.   Feet:      Right foot:      Protective Sensation: 5 sites tested.  5 sites sensed.      Skin integrity: Skin integrity normal.      Left foot:      Protective Sensation: 5 sites tested.  5 sites sensed.      Skin integrity: Skin integrity normal.   Lymphadenopathy:      Cervical: No cervical adenopathy.   Skin:     General: Skin is warm.   Neurological:      Mental Status: He is alert and oriented to person, place, and time. Mental status is at baseline.   Psychiatric:         Mood and Affect: Mood normal.         Behavior: Behavior normal.         Thought Content: Thought content normal.         Judgment: Judgment normal.      Result Review :                   Assessment and Plan   Diagnoses and all orders for this visit:    1. Essential hypertension (Primary)  -     CBC & Differential; Future  -     Comprehensive Metabolic Panel; Future  -     Hemoglobin A1c; Future  -     Lipid  Panel With LDL / HDL Ratio; Future  -     TSH; Future  -     T4, Free; Future    2. Cough  -     albuterol sulfate  (90 Base) MCG/ACT inhaler; Inhale 2 puffs Every 4 (Four) Hours As Needed for Wheezing.  Dispense: 18 g; Refill: 3    3. Nasal congestion  -     albuterol sulfate  (90 Base) MCG/ACT inhaler; Inhale 2 puffs Every 4 (Four) Hours As Needed for Wheezing.  Dispense: 18 g; Refill: 3    4. Hyperlipidemia, unspecified hyperlipidemia type  -     CBC & Differential; Future  -     Comprehensive Metabolic Panel; Future  -     Hemoglobin A1c; Future  -     Lipid Panel With LDL / HDL Ratio; Future  -     TSH; Future  -     T4, Free; Future    5. Type 2 diabetes mellitus without complication, without long-term current use of insulin  -     CBC & Differential; Future  -     Comprehensive Metabolic Panel; Future  -     Hemoglobin A1c; Future  -     Lipid Panel With LDL / HDL Ratio; Future  -     TSH; Future  -     T4, Free; Future    6. Upper respiratory tract infection, unspecified type    Other orders  -     azithromycin (Zithromax) 250 MG tablet; Take 2 tablets day 1 and then 1 tablet daily for 4 days  Dispense: 6 tablet; Refill: 0      URI--patient has tried Mucinex as well as allergy treatment with Zyrtec, Sudafed and Flonase.  He is using as needed albuterol for the cough with good results.  I am going to prescribe Z-Mauri and refill his albuterol.  His exam today looks normal other than mild fluid behind his left ear.  Diabetes is well controlled with the combination of Victoza, metformin, Jardiance, and glimepiride.  A1c is acceptable.  Labs ordered for future  Cholesterol controlled well with Tricor 145 mg daily.  Liver enzymes are normal.  Hypertension well-controlled with lisinopril 10 mg daily.  He has a prescription for Valtrex for as needed cold sores.  GERD is controlled with omeprazole 20 mg daily.  I will see him back in 6 to 8 months foot exam today is normal       Follow Up   No follow-ups  on file.  Patient was given instructions and counseling regarding his condition or for health maintenance advice. Please see specific information pulled into the AVS if appropriate.

## 2023-07-27 VITALS
OXYGEN SATURATION: 87 % | DIASTOLIC BLOOD PRESSURE: 65 MMHG | OXYGEN SATURATION: 100 % | SYSTOLIC BLOOD PRESSURE: 80 MMHG | DIASTOLIC BLOOD PRESSURE: 50 MMHG | SYSTOLIC BLOOD PRESSURE: 96 MMHG | SYSTOLIC BLOOD PRESSURE: 91 MMHG | OXYGEN SATURATION: 95 % | HEART RATE: 87 BPM | RESPIRATION RATE: 18 BRPM | HEART RATE: 86 BPM | RESPIRATION RATE: 20 BRPM | TEMPERATURE: 98.4 F | OXYGEN SATURATION: 96 % | TEMPERATURE: 98.2 F | SYSTOLIC BLOOD PRESSURE: 88 MMHG | SYSTOLIC BLOOD PRESSURE: 83 MMHG | OXYGEN SATURATION: 88 % | SYSTOLIC BLOOD PRESSURE: 81 MMHG | DIASTOLIC BLOOD PRESSURE: 78 MMHG | DIASTOLIC BLOOD PRESSURE: 57 MMHG | SYSTOLIC BLOOD PRESSURE: 99 MMHG | RESPIRATION RATE: 19 BRPM | HEART RATE: 85 BPM | SYSTOLIC BLOOD PRESSURE: 122 MMHG | OXYGEN SATURATION: 97 % | HEART RATE: 89 BPM | OXYGEN SATURATION: 89 % | HEART RATE: 84 BPM | HEART RATE: 74 BPM | HEART RATE: 90 BPM | RESPIRATION RATE: 14 BRPM | HEART RATE: 94 BPM | OXYGEN SATURATION: 99 % | WEIGHT: 255 LBS | DIASTOLIC BLOOD PRESSURE: 47 MMHG | DIASTOLIC BLOOD PRESSURE: 52 MMHG | DIASTOLIC BLOOD PRESSURE: 43 MMHG | HEIGHT: 70 IN | RESPIRATION RATE: 16 BRPM | SYSTOLIC BLOOD PRESSURE: 76 MMHG | DIASTOLIC BLOOD PRESSURE: 77 MMHG

## 2023-07-31 PROBLEM — Z86.010 PERSONAL HISTORY OF COLONIC POLYPS: Status: ACTIVE | Noted: 2023-08-01

## 2023-07-31 PROBLEM — Z86.010 SURVEILLANCE DUE TO PRIOR COLONIC NEOPLASIA: Status: ACTIVE | Noted: 2023-08-01

## 2023-08-01 ENCOUNTER — OFFICE (AMBULATORY)
Dept: URBAN - METROPOLITAN AREA PATHOLOGY 4 | Facility: PATHOLOGY | Age: 58
End: 2023-08-01
Payer: COMMERCIAL

## 2023-08-01 ENCOUNTER — AMBULATORY SURGICAL CENTER (AMBULATORY)
Dept: URBAN - METROPOLITAN AREA SURGERY 17 | Facility: SURGERY | Age: 58
End: 2023-08-01
Payer: COMMERCIAL

## 2023-08-01 DIAGNOSIS — D12.2 BENIGN NEOPLASM OF ASCENDING COLON: ICD-10-CM

## 2023-08-01 DIAGNOSIS — D12.3 BENIGN NEOPLASM OF TRANSVERSE COLON: ICD-10-CM

## 2023-08-01 DIAGNOSIS — K57.30 DIVERTICULOSIS OF LARGE INTESTINE WITHOUT PERFORATION OR ABS: ICD-10-CM

## 2023-08-01 DIAGNOSIS — Z86.010 PERSONAL HISTORY OF COLONIC POLYPS: ICD-10-CM

## 2023-08-01 PROBLEM — K63.5 POLYP OF COLON: Status: ACTIVE | Noted: 2023-08-01

## 2023-08-01 LAB
GI HISTOLOGY: A. UNSPECIFIED: (no result)
GI HISTOLOGY: B. UNSPECIFIED: (no result)
GI HISTOLOGY: PDF REPORT: (no result)

## 2023-08-01 PROCEDURE — 45385 COLONOSCOPY W/LESION REMOVAL: CPT | Mod: 33 | Performed by: INTERNAL MEDICINE

## 2023-08-01 PROCEDURE — 88305 TISSUE EXAM BY PATHOLOGIST: CPT | Performed by: INTERNAL MEDICINE

## 2023-08-01 NOTE — SERVICEHPINOTES
57-year-old gentleman without GI complaints.  He does however have a personal history of adenomatous polyps and presents for a follow-up colonoscopy.  Family history is negative.

## 2023-08-17 DIAGNOSIS — E11.65 TYPE 2 DIABETES MELLITUS WITH HYPERGLYCEMIA, UNSPECIFIED WHETHER LONG TERM INSULIN USE: ICD-10-CM

## 2023-08-17 RX ORDER — BLOOD SUGAR DIAGNOSTIC
STRIP MISCELLANEOUS
Qty: 100 EACH | Refills: 4 | Status: SHIPPED | OUTPATIENT
Start: 2023-08-17

## 2023-08-17 NOTE — TELEPHONE ENCOUNTER
Rx Refill Note  Requested Prescriptions     Pending Prescriptions Disp Refills    glucose blood (OneTouch Verio) test strip 100 each 4     Sig: Check once a day not on insulin tx, poor control, hypoglycemia. Dx: E 11.65      Last office visit with prescribing clinician: 7/26/2023   Last telemedicine visit with prescribing clinician: Visit date not found   Next office visit with prescribing clinician: 1/30/2024                         Would you like a call back once the refill request has been completed: [] Yes [] No    If the office needs to give you a call back, can they leave a voicemail: [] Yes [] No    Jamee Oliveira MA  08/17/23, 11:03 EDT

## 2023-08-22 ENCOUNTER — TELEPHONE (OUTPATIENT)
Dept: FAMILY MEDICINE CLINIC | Facility: CLINIC | Age: 58
End: 2023-08-22

## 2023-08-22 DIAGNOSIS — E11.65 TYPE 2 DIABETES MELLITUS WITH HYPERGLYCEMIA, UNSPECIFIED WHETHER LONG TERM INSULIN USE: ICD-10-CM

## 2023-08-22 DIAGNOSIS — E78.1 HYPERTRIGLYCERIDEMIA: ICD-10-CM

## 2023-08-22 RX ORDER — METFORMIN HYDROCHLORIDE 500 MG/1
TABLET, EXTENDED RELEASE ORAL
Qty: 360 TABLET | Refills: 2 | Status: SHIPPED | OUTPATIENT
Start: 2023-08-22

## 2023-08-22 RX ORDER — GLIMEPIRIDE 2 MG/1
TABLET ORAL
Qty: 180 TABLET | Refills: 2 | Status: SHIPPED | OUTPATIENT
Start: 2023-08-22

## 2023-08-22 RX ORDER — FENOFIBRATE 145 MG/1
TABLET, COATED ORAL
Qty: 90 TABLET | Refills: 2 | Status: SHIPPED | OUTPATIENT
Start: 2023-08-22

## 2023-08-22 RX ORDER — LIRAGLUTIDE 6 MG/ML
INJECTION SUBCUTANEOUS
Qty: 27 ML | Refills: 2 | Status: SHIPPED | OUTPATIENT
Start: 2023-08-22

## 2023-08-22 NOTE — TELEPHONE ENCOUNTER
Caller: Rashid Henderson    Relationship: Self    Best call back number: [phone]     What orders are you requesting (i.e. lab or imaging): CT     In what timeframe would the patient need to come in: ASA    Where will you receive your lab/imaging services: [facility] OR [facility]    Additional notes: WHILE GETTING AN X-RAY, A NODULE WAS FOUND IN THE PATIENT'S LUNGS. THE X-RAY TECH RECOMMENDED A CT.

## 2023-08-22 NOTE — TELEPHONE ENCOUNTER
Caller: Rashid Henderson    Relationship: Self    Best call back number: [phone]     Requested Prescriptions:   Requested Prescriptions     Pending Prescriptions Disp Refills    glimepiride (Amaryl) 2 MG tablet 180 tablet 2     Sig: By mouth take one tab twice daily with AM and PM meals.    metFORMIN ER (GLUCOPHAGE-XR) 500 MG 24 hr tablet 360 tablet 2     Sig: By mouth take 2 tabs twice daily after AM & PM meals.    Liraglutide (Victoza) 18 MG/3ML solution pen-injector injection 27 mL 2     Sig: In AM inject 1.8 mg daily.    fenofibrate (TRICOR) 145 MG tablet 90 tablet 2     Sig: By mouth take one tab daily.    empagliflozin (Jardiance) 25 MG tablet tablet 90 tablet 2     Sig: By mouth take one tab daily in AM.      Pharmacy where request should be sent: [id number] [place] [id number] [facility] AT [address] - [phone]  - [phone] FX     Last office visit with prescribing clinician: 7/26/2023   Last telemedicine visit with prescribing clinician: Visit date not found   Next office visit with prescribing clinician: 1/30/2024     Additional details provided by patient: THESE PRESCRIPTIONS WERE WRITTEN BY ROVERTO CLIFTON WHO THE PATIENT NO LONGER SEES.    Herbert Melgoza Rep   08/22/23 12:32 EDT

## 2023-08-24 ENCOUNTER — OFFICE VISIT (OUTPATIENT)
Dept: FAMILY MEDICINE CLINIC | Facility: CLINIC | Age: 58
End: 2023-08-24
Payer: COMMERCIAL

## 2023-08-24 VITALS
OXYGEN SATURATION: 96 % | WEIGHT: 256.3 LBS | SYSTOLIC BLOOD PRESSURE: 122 MMHG | DIASTOLIC BLOOD PRESSURE: 66 MMHG | BODY MASS INDEX: 36.69 KG/M2 | HEIGHT: 70 IN | HEART RATE: 82 BPM

## 2023-08-24 DIAGNOSIS — R05.1 ACUTE COUGH: Primary | ICD-10-CM

## 2023-08-24 PROCEDURE — 99214 OFFICE O/P EST MOD 30 MIN: CPT | Performed by: FAMILY MEDICINE

## 2023-08-24 RX ORDER — CIPROFLOXACIN 500 MG/1
500 TABLET, FILM COATED ORAL 2 TIMES DAILY
Qty: 20 TABLET | Refills: 0 | Status: SHIPPED | OUTPATIENT
Start: 2023-08-24

## 2023-08-24 RX ORDER — BUDESONIDE AND FORMOTEROL FUMARATE DIHYDRATE 80; 4.5 UG/1; UG/1
2 AEROSOL RESPIRATORY (INHALATION)
Qty: 10.2 G | Refills: 12 | Status: SHIPPED | OUTPATIENT
Start: 2023-08-24

## 2023-08-24 NOTE — PROGRESS NOTES
"Chief Complaint  Chief Complaint   Patient presents with    Xray results    Cough       Subjective    History of Present Illness        Rashid Henderson presents to [facility] for   Cough  This is a new problem. The current episode started in the past 7 days. The cough is Productive of sputum. Associated symptoms include shortness of breath and wheezing. Pertinent negatives include no chills, ear congestion, myalgias, nasal congestion, postnasal drip, rash, sore throat or weight loss.      Objective   Vital Signs:   Visit Vitals  /66 (BP Location: Left arm, Patient Position: Sitting, Cuff Size: Large Adult)   Pulse 82   Ht 177.8 cm (70\")   Wt 116 kg (256 lb 4.8 oz)   SpO2 96%   BMI 36.78 kg/m²          Physical Exam  Vitals reviewed.   Constitutional:       Appearance: He is well-developed.   HENT:      Head: Normocephalic.      Right Ear: External ear normal.      Left Ear: External ear normal.      Nose: Nose normal.   Eyes:      Conjunctiva/sclera: Conjunctivae normal.   Cardiovascular:      Rate and Rhythm: Normal rate and regular rhythm.   Pulmonary:      Effort: Pulmonary effort is normal.      Breath sounds: Normal breath sounds.   Musculoskeletal:         General: Normal range of motion.      Cervical back: Normal range of motion and neck supple.   Skin:     General: Skin is warm and dry.      Capillary Refill: Capillary refill takes less than 2 seconds.   Neurological:      Mental Status: He is alert and oriented to person, place, and time.          Result Review :                    Assessment and Plan      Diagnoses and all orders for this visit:    1. Acute cough (Primary)  Assessment & Plan:  Due to his worsening symptoms CT chest was ordered.  Patient was started on Cipro and given a prescription of Symbicort to treat his symptoms.  Patient was encouraged to return to clinic if symptoms do not improve.    Orders:  -     budesonide-formoterol (Symbicort) 80-4.5 MCG/ACT " inhaler; Inhale 2 puffs 2 (Two) Times a Day.  Dispense: 10.2 g; Refill: 12  -     CT Chest Without Contrast Diagnostic; Future  -     ciprofloxacin (CIPRO) 500 MG tablet; Take 1 tablet by mouth 2 (Two) Times a Day.  Dispense: 20 tablet; Refill: 0             Follow Up   No follow-ups on file.  Patient was given instructions and counseling regarding his condition or for health maintenance advice. Please see specific information pulled into the AVS if appropriate.     Answers submitted by the patient for this visit:  Primary Reason for Visit (Submitted on 8/23/2023)  What is the primary reason for your visit?: Other  Other (Submitted on 8/23/2023)  Please describe your symptoms.: Persistent cough for several weeks  Have you had these symptoms before?: No  How long have you been having these symptoms?: Greater than 2 weeks  Please list any medications you are currently taking for this condition.: Promethazine DM, Benzonnatate

## 2023-09-12 PROBLEM — R05.1 ACUTE COUGH: Status: ACTIVE | Noted: 2023-09-12

## 2023-09-12 RX ORDER — LISINOPRIL 10 MG/1
10 TABLET ORAL DAILY
Qty: 90 TABLET | Refills: 1 | Status: SHIPPED | OUTPATIENT
Start: 2023-09-12

## 2023-09-12 NOTE — ASSESSMENT & PLAN NOTE
Due to his worsening symptoms CT chest was ordered.  Patient was started on Cipro and given a prescription of Symbicort to treat his symptoms.  Patient was encouraged to return to clinic if symptoms do not improve.

## 2023-09-14 ENCOUNTER — HOSPITAL ENCOUNTER (OUTPATIENT)
Dept: CT IMAGING | Facility: HOSPITAL | Age: 58
Discharge: HOME | End: 2023-09-14
Admitting: FAMILY MEDICINE
Payer: COMMERCIAL

## 2023-09-14 DIAGNOSIS — R05.1 ACUTE COUGH: ICD-10-CM

## 2023-09-14 PROCEDURE — 71250 CT THORAX DX C-: CPT

## 2023-09-25 RX ORDER — OMEPRAZOLE 20 MG/1
40 CAPSULE, DELAYED RELEASE ORAL DAILY
Qty: 180 CAPSULE | Refills: 1 | Status: SHIPPED | OUTPATIENT
Start: 2023-09-25

## 2023-10-13 DIAGNOSIS — E11.65 TYPE 2 DIABETES MELLITUS WITH HYPERGLYCEMIA, UNSPECIFIED WHETHER LONG TERM INSULIN USE: ICD-10-CM

## 2023-10-13 NOTE — TELEPHONE ENCOUNTER
Incoming Refill Request      Medication requested (name and dose): BD PEN NEEDLES    Pharmacy where request should be sent: [facility] [address] [place] [address]    Additional details provided by patient:     Best call back number: [phone]     Does the patient have less than a 3 day supply:  [] Yes  [] No    Herbert Beck Rep  10/13/23, 13:02 EDT

## 2023-10-16 RX ORDER — FLURBIPROFEN SODIUM 0.3 MG/ML
SOLUTION/ DROPS OPHTHALMIC
Qty: 100 EACH | Refills: 4 | OUTPATIENT
Start: 2023-10-16

## 2023-10-20 ENCOUNTER — OFFICE VISIT (OUTPATIENT)
Dept: FAMILY MEDICINE CLINIC | Facility: CLINIC | Age: 58
End: 2023-10-20
Payer: COMMERCIAL

## 2023-10-20 VITALS
DIASTOLIC BLOOD PRESSURE: 64 MMHG | WEIGHT: 262 LBS | TEMPERATURE: 98.6 F | SYSTOLIC BLOOD PRESSURE: 116 MMHG | HEIGHT: 70 IN | HEART RATE: 71 BPM | BODY MASS INDEX: 37.51 KG/M2 | OXYGEN SATURATION: 99 %

## 2023-10-20 DIAGNOSIS — I25.84 CORONARY ARTERY CALCIFICATION: ICD-10-CM

## 2023-10-20 DIAGNOSIS — R93.89 ABNORMAL CHEST CT: ICD-10-CM

## 2023-10-20 DIAGNOSIS — K44.9 HIATAL HERNIA: ICD-10-CM

## 2023-10-20 DIAGNOSIS — I25.10 CORONARY ARTERY CALCIFICATION: ICD-10-CM

## 2023-10-20 DIAGNOSIS — I10 ESSENTIAL HYPERTENSION: Primary | ICD-10-CM

## 2023-10-20 DIAGNOSIS — K22.89 THICKENING OF ESOPHAGUS: ICD-10-CM

## 2023-10-20 DIAGNOSIS — J84.9 INTERSTITIAL LUNG DISEASE: ICD-10-CM

## 2023-10-20 DIAGNOSIS — E11.65 TYPE 2 DIABETES MELLITUS WITH HYPERGLYCEMIA, UNSPECIFIED WHETHER LONG TERM INSULIN USE: ICD-10-CM

## 2023-10-20 PROCEDURE — 99214 OFFICE O/P EST MOD 30 MIN: CPT | Performed by: INTERNAL MEDICINE

## 2023-10-20 RX ORDER — LOSARTAN POTASSIUM 25 MG/1
25 TABLET ORAL DAILY
Qty: 90 TABLET | Refills: 1 | Status: SHIPPED | OUTPATIENT
Start: 2023-10-20

## 2023-10-20 RX ORDER — FLURBIPROFEN SODIUM 0.3 MG/ML
SOLUTION/ DROPS OPHTHALMIC
Qty: 100 EACH | Refills: 4 | Status: SHIPPED | OUTPATIENT
Start: 2023-10-20

## 2023-10-20 NOTE — PROGRESS NOTES
"Chief Complaint  CT Scan     Subjective        Rashid Henderson presents to [facility]  History of Present Illness  Went to  in [place] Cough has resolved and no soa.  No marcelo.  No fever.  Cough resolved once he went on vacation at the beach the following week after starting symbicort and cipro.  Chest CT done on 9/14/23 and showed possible interstitial lung disease and pulmonary referral recommended as well as high resolution chest CT.  Mom has interstitial lung disease and sees pulmonary herself.  He does smoke cigars and pipes about once a month but hasn't smoked in the past year or so.  Wife does smoke in the car and in their house.    FBS   Chest CT was done September because he went to the urgent care for cough in August that showed abnormal chest x-ray.  He then came to see Dr. Cortes to order the chest CT.  Chest CT findings showed moderate coronary artery calcifications, subcentimeter mediastinal nodes which are coarsely calcified, coarsened interstitial markings particular in the periphery of the lower and upper lobes without significant bronchiectasis or honeycombing.  Concerns were for interstitial lung disease.  High-resolution chest CT recommended and pulmonary follow-up recommended.  There were also micronodules in the periphery of the right middle lobe.  Upper abdomen showed fatty infiltration to the liver and pancreas and a small hiatal hernia with thickening of the distal esophagus.  He is here today to discuss findings.  Objective     Vital Signs:  /64 (BP Location: Left arm, Patient Position: Sitting, Cuff Size: Large Adult)   Pulse 71   Temp 98.6 °F (37 °C)   Ht 177.8 cm (70\")   Wt 119 kg (262 lb)   SpO2 99%   BMI 37.59 kg/m²   Estimated body mass index is 37.59 kg/m² as calculated from the following:    Height as of this encounter: 177.8 cm (70\").    Weight as of this encounter: 119 kg (262 lb).     CT Chest Without Contrast Diagnostic " (09/14/2023 15:56)           Physical Exam  Vitals and nursing note reviewed.   Constitutional:       Appearance: Normal appearance. He is well-developed.   HENT:      Head: Normocephalic and atraumatic.      Right Ear: External ear normal.      Left Ear: External ear normal.   Eyes:      Extraocular Movements: Extraocular movements intact.      Conjunctiva/sclera: Conjunctivae normal.   Neck:      Vascular: No carotid bruit.   Cardiovascular:      Rate and Rhythm: Normal rate and regular rhythm.      Heart sounds: Normal heart sounds.      Comments: No bruits  Pulmonary:      Effort: Pulmonary effort is normal. No respiratory distress.      Breath sounds: Normal breath sounds. No stridor. No wheezing, rhonchi or rales.   Chest:      Chest wall: No tenderness.   Abdominal:      General: Bowel sounds are normal. There is no distension.      Palpations: Abdomen is soft. There is no mass.      Tenderness: There is no abdominal tenderness. There is no guarding or rebound.      Hernia: No hernia is present.   Musculoskeletal:      Cervical back: Neck supple.   Lymphadenopathy:      Cervical: No cervical adenopathy.   Skin:     General: Skin is warm.   Neurological:      Mental Status: He is alert and oriented to person, place, and time. Mental status is at baseline.   Psychiatric:         Mood and Affect: Mood normal.         Behavior: Behavior normal.         Thought Content: Thought content normal.         Judgment: Judgment normal.        Result Review :                   Assessment and Plan   Diagnoses and all orders for this visit:    1. Essential hypertension (Primary)  -     losartan (Cozaar) 25 MG tablet; Take 1 tablet by mouth Daily.  Dispense: 90 tablet; Refill: 1  -     Basic Metabolic Panel; Future    2. Type 2 diabetes mellitus with hyperglycemia, unspecified whether long term insulin use  -     Insulin Pen Needle (B-D UF III MINI PEN NEEDLES) 31G X 5 MM misc; For use with GLP-1 pen device to inject based on  frequency of medication.  Dispense: 100 each; Refill: 4  -     Hemoglobin A1c; Future    3. Abnormal chest CT  -     Ambulatory Referral to Pulmonology    4. Interstitial lung disease  -     Ambulatory Referral to Pulmonology    5. Coronary artery calcification  -     Ambulatory Referral to Cardiology    6. Thickening of esophagus  -     Ambulatory Referral to General Surgery    7. Hiatal hernia  -     Ambulatory Referral to General Surgery    Abnormal chest CT showing thickening of the esophagus and hiatal hernia will refer to general surgery for EGD.  Coronary artery calcifications moderate in nature will refer to [place] [facility] group for further work-up and restratification.  Medical management is present through controlling diabetes, hypertension hyperlipidemia.  Lifestyle modification recommended.  I have encouraged him to stop all cigar pipe smoking and to eliminate secondhand smoking exposure through his family members.  He voices understanding.  We will refer to pulmonary, Dr. Munroe, due to markings on chest CT consistent with interstitial lung disease.  Family history of interstitial lung disease in his mom.  He currently is without cough or shortness of breath.  He is on Symbicort.  I wonder if part of his control of his lung symptoms is because of the Symbicort.  He is interested in trying to wean off and we discussed weaning off the Symbicort.  If cough returns, he will need to restart Symbicort.  I also have change his blood pressure medication to eliminate any possibility that the lisinopril could be contributing to coughing.  He is not can to be on losartan 25 mg and he will come back in 1 week for a BMP.  I did refill his needles that he uses for Victoza and have checked an A1c to see about his control of diabetes.  Fatty infiltration to the liver and pancreas has been discussed.  Lifestyle modification with eliminating fat eliminating high carb foods and exercise.  Minimizing alcohol all  discussed with patient and he voiced understanding.  Time with patient 30 minutes         Follow Up   No follow-ups on file.  Patient was given instructions and counseling regarding his condition or for health maintenance advice. Please see specific information pulled into the AVS if appropriate.         Answers submitted by the patient for this visit:  Primary Reason for Visit (Submitted on 10/13/2023)  What is the primary reason for your visit?: Cough

## 2023-10-21 DIAGNOSIS — R05.9 COUGH: ICD-10-CM

## 2023-10-21 DIAGNOSIS — R09.81 NASAL CONGESTION: ICD-10-CM

## 2023-10-23 RX ORDER — ALBUTEROL SULFATE 90 UG/1
2 AEROSOL, METERED RESPIRATORY (INHALATION) EVERY 4 HOURS PRN
Qty: 18 G | Refills: 3 | Status: SHIPPED | OUTPATIENT
Start: 2023-10-23

## 2023-10-31 ENCOUNTER — LAB (OUTPATIENT)
Dept: LAB | Facility: HOSPITAL | Age: 58
End: 2023-10-31
Payer: COMMERCIAL

## 2023-10-31 PROCEDURE — 83036 HEMOGLOBIN GLYCOSYLATED A1C: CPT | Performed by: INTERNAL MEDICINE

## 2023-10-31 PROCEDURE — 80048 BASIC METABOLIC PNL TOTAL CA: CPT | Performed by: INTERNAL MEDICINE

## 2023-11-02 ENCOUNTER — OFFICE VISIT (OUTPATIENT)
Age: 58
End: 2023-11-02
Payer: COMMERCIAL

## 2023-11-02 VITALS
HEART RATE: 75 BPM | OXYGEN SATURATION: 95 % | BODY MASS INDEX: 38.22 KG/M2 | RESPIRATION RATE: 17 BRPM | SYSTOLIC BLOOD PRESSURE: 150 MMHG | WEIGHT: 267 LBS | HEIGHT: 70 IN | DIASTOLIC BLOOD PRESSURE: 90 MMHG

## 2023-11-02 DIAGNOSIS — I10 ESSENTIAL HYPERTENSION, BENIGN: ICD-10-CM

## 2023-11-02 DIAGNOSIS — E78.1 HYPERTRIGLYCERIDEMIA: ICD-10-CM

## 2023-11-02 DIAGNOSIS — G47.33 OSA (OBSTRUCTIVE SLEEP APNEA): ICD-10-CM

## 2023-11-02 DIAGNOSIS — E11.65 TYPE 2 DIABETES MELLITUS WITH HYPERGLYCEMIA, UNSPECIFIED WHETHER LONG TERM INSULIN USE: ICD-10-CM

## 2023-11-02 DIAGNOSIS — I25.10 CORONARY ARTERY CALCIFICATION SEEN ON CT SCAN: Primary | ICD-10-CM

## 2023-11-02 PROCEDURE — 93000 ELECTROCARDIOGRAM COMPLETE: CPT | Performed by: INTERNAL MEDICINE

## 2023-11-02 PROCEDURE — 99204 OFFICE O/P NEW MOD 45 MIN: CPT | Performed by: INTERNAL MEDICINE

## 2023-11-02 NOTE — PROGRESS NOTES
Subjective:     Encounter Date:11/02/2023      Patient ID: Rashid Henderson is a 58 y.o. male.    Chief Complaint:  History of Present Illness    This is a 58-year-old with hypertension, hyperlipidemia, diabetes mellitus type 2, tobacco use, alcohol use, who presents for an evaluation of coronary artery calcification.    The patient recently had been having issues with shortness of breath and cough that was not getting better.  As a result a CT scan of the chest was ordered by Dr. Omar Cortes.  Distal performed on 9/14/2023 and showed no acute cardiopulmonary process.  However did show evidence of increased interstitial marker rings in the periphery of the upper and lower lobes of the lungs suggestive of interstitial lung disease, moderate coronary artery calcification hepatic steatosis, and a small hiatal hernia.  He saw Dr. Al in follow-up who recommended referral to pulmonary and general surgery regarding the pulmonary and GI findings and referral to us regarding the coronary artery calcification.  In addition his lisinopril was switched to losartan to see if this would help with his cough.    It appears that the patient underwent a stress echocardiogram in 8/2014 which was negative for ischemia.  His baseline echocardiogram showed normal left ventricular systolic function wall motion with an EF of 60%, normal diastolic function and no significant valvular disease.    Patient reports that his cough began to improve after he was started on an inhaler by Dr. Cortes and went on vacation.  He has not really had any recurrent cough since then.  He denies any chest pain, shortness of breath, palpitations, orthopnea, near-syncope or syncope or lower extremity swelling.  He admits that he is somewhat sedentary.  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  However he is able to go up and down stairs without any significant issues.  He denies any family history of coronary artery disease.    Review of Systems    Constitutional: Negative for malaise/fatigue.   HENT:  Negative for hearing loss, hoarse voice, nosebleeds and sore throat.    Eyes:  Negative for pain.   Cardiovascular:  Negative for chest pain, claudication, cyanosis, dyspnea on exertion, irregular heartbeat, leg swelling, near-syncope, orthopnea, palpitations, paroxysmal nocturnal dyspnea and syncope.   Respiratory:  Negative for shortness of breath and snoring.    Endocrine: Negative for cold intolerance, heat intolerance, polydipsia, polyphagia and polyuria.   Skin:  Negative for itching and rash.   Musculoskeletal:  Negative for arthritis, falls, joint pain, joint swelling, muscle cramps, muscle weakness and myalgias.   Gastrointestinal:  Negative for constipation, diarrhea, dysphagia, heartburn, hematemesis, hematochezia, melena, nausea and vomiting.   Genitourinary:  Negative for frequency, hematuria and hesitancy.   Neurological:  Negative for excessive daytime sleepiness, dizziness, headaches, light-headedness, numbness and weakness.   Psychiatric/Behavioral:  Negative for depression. The patient is not nervous/anxious.          Current Outpatient Medications:     albuterol sulfate  (90 Base) MCG/ACT inhaler, INHALE 2 PUFFS BY MOUTH EVERY 4 HOURS AS NEEDED FOR WHEEZING, Disp: 18 g, Rfl: 3    budesonide-formoterol (Symbicort) 80-4.5 MCG/ACT inhaler, Inhale 2 puffs 2 (Two) Times a Day., Disp: 10.2 g, Rfl: 12    empagliflozin (Jardiance) 25 MG tablet tablet, By mouth take one tab daily in AM., Disp: 90 tablet, Rfl: 2    fenofibrate (TRICOR) 145 MG tablet, By mouth take one tab daily., Disp: 90 tablet, Rfl: 2    fluticasone (FLONASE) 50 MCG/ACT nasal spray, 2 sprays into the nostril(s) as directed by provider Daily. (Patient taking differently: 2 sprays into the nostril(s) as directed by provider As Needed.), Disp: 1 bottle, Rfl: 0    glimepiride (Amaryl) 2 MG tablet, By mouth take one tab twice daily with AM and PM meals., Disp: 180 tablet, Rfl: 2     glucose blood (OneTouch Verio) test strip, Check once a day not on insulin tx, poor control, hypoglycemia. Dx: E 11.65, Disp: 100 each, Rfl: 4    Insulin Pen Needle (B-D UF III MINI PEN NEEDLES) 31G X 5 MM misc, For use with GLP-1 pen device to inject based on frequency of medication., Disp: 100 each, Rfl: 4    Liraglutide (Victoza) 18 MG/3ML solution pen-injector injection, In AM inject 1.8 mg daily., Disp: 27 mL, Rfl: 2    losartan (Cozaar) 25 MG tablet, Take 1 tablet by mouth Daily., Disp: 90 tablet, Rfl: 1    metFORMIN ER (GLUCOPHAGE-XR) 500 MG 24 hr tablet, By mouth take 2 tabs twice daily after AM & PM meals., Disp: 360 tablet, Rfl: 2    Omega-3 Fatty Acids (FISH OIL) 1000 MG capsule capsule, Take 3 capsules by mouth Daily., Disp: , Rfl:     omeprazole (priLOSEC) 20 MG capsule, TAKE 2 CAPSULES BY MOUTH DAILY, Disp: 180 capsule, Rfl: 1    valACYclovir (VALTREX) 500 MG tablet, TAKE 1 TABLET BY MOUTH TWICE DAILY FOR 3 DAYS, Disp: 6 tablet, Rfl: 0    Past Medical History:   Diagnosis Date    Abnormal electrocardiogram 01/19/2016    Abnormal LFTs (liver function tests)     suspected fatty liver dz    Allergic     Eczema 01/19/2016    Esophageal stricture     Essential hypertension, benign     GERD (gastroesophageal reflux disease)     Hypertriglyceridemia     Obesity     KATTY (obstructive sleep apnea)     Type 2 diabetes mellitus with hyperglycemia 2010       Past Surgical History:   Procedure Laterality Date    COLONOSCOPY  approx 2019    benign polyps per pt     ENDOSCOPY N/A 2/15/2021    Procedure: ESOPHAGOGASTRODUODENOSCOPY WITH REMOVAL OF FOOD BOLUS;  Surgeon: Santo Grant MD;  Location: [facility] MAIN OR;  Service: Gastroenterology;  Laterality: N/A;    ENDOSCOPY N/A 3/24/2021    Procedure: ESOPHAGOGASTRODUODENOSCOPY WITH 15-18MM BALLOON DILATATION AND BIOPSIES;  Surgeon: Santo Grant MD;  Location: [facility] ENDOSCOPY;  Service: Gastroenterology;  Laterality: N/A;  PRE- ESOPHAGEAL STRICTURE  POST-  "HIATAL HERNIA, ESOPHAGEAL STRICTURE, GASTRITIS    LASIK      VASECTOMY         Family History   Problem Relation Age of Onset    Other Mother         Heart    Diabetes Father     Diabetes Brother     Hyperlipidemia Brother     Cancer Maternal Grandmother     Alcohol abuse Maternal Grandfather     Cancer Paternal Grandmother     Diabetes Paternal Grandfather     Diabetes Sister     Diabetes Brother     Malig Hyperthermia Neg Hx        Social History     Tobacco Use    Smoking status: Never     Passive exposure: Never    Smokeless tobacco: Never   Vaping Use    Vaping Use: Never used   Substance Use Topics    Alcohol use: Yes     Comment: SOC    Drug use: No         ECG 12 Lead    Date/Time: 11/2/2023 3:44 PM  Performed by: Leana Santana MD    Authorized by: Leana Santana MD  Comparison: compared with previous ECG   Similar to previous ECG  Rhythm: sinus rhythm  Conduction: 1st degree AV block             Objective:     Visit Vitals  /90   Pulse 75   Resp 17   Ht 177.8 cm (70\")   Wt 121 kg (267 lb)   SpO2 95%   BMI 38.31 kg/m²         Constitutional:       Appearance: Normal appearance. Well-developed.   HENT:      Head: Normocephalic and atraumatic.   Neck:      Vascular: No carotid bruit or JVD.   Pulmonary:      Effort: Pulmonary effort is normal.      Breath sounds: Normal breath sounds.   Cardiovascular:      Normal rate. Regular rhythm.      No gallop.    Pulses:     Radial: 2+ bilaterally.  Edema:     Peripheral edema absent.   Abdominal:      Palpations: Abdomen is soft.   Skin:     General: Skin is warm and dry.   Neurological:      Mental Status: Alert and oriented to person, place, and time.             Assessment:          Diagnosis Plan   1. Coronary artery calcification seen on CT scan        2. Essential hypertension, benign        3. Type 2 diabetes mellitus with hyperglycemia, unspecified whether long term insulin use        4. Hypertriglyceridemia        5. KATTY (obstructive sleep apnea)   "             Plan:       1.  Coronary artery calcification.  Incidentally noted on his CT scan.  Not having any symptoms concerning for obstructive coronary artery disease.  Normally I would consider starting the patient on statin therapy but his lipids have looked pretty good off of any statin therapy.  He is also had some issues with some mild elevated liver transaminases.  For this reason I would like to further quantify his calcium before recommending any statin therapy.  Recommended proceeding with a coronary calcium CT.  If this shows a score of less than 100 I think we can hold off on statin therapy.  2.  Hypertension.  Well-controlled on his current regimen medications.  3.  Diabetes mellitus type 2  4.  Obstructive sleep apnea.    I will call and discuss results of his coronary calcium CT and determine further recommendations based on those results.

## 2023-11-03 ENCOUNTER — PATIENT ROUNDING (BHMG ONLY) (OUTPATIENT)
Age: 58
End: 2023-11-03
Payer: COMMERCIAL

## 2023-11-03 NOTE — PROGRESS NOTES
A My Chart message has been sent to the patient for PATIENT ROUNDING with [facility]

## 2023-11-07 NOTE — PROGRESS NOTES
Subjective   Rashid Henderson is a 53 y.o. male who comes in today for   Chief Complaint   Patient presents with   • Hyperlipidemia   • Diabetes   .    History of Present Illness   Here for f/u on NIDDM on janumet and jardiance.  Since Sunday has had PND and nasal congestion since Sunday and slight feverish in am.  Slight cough.  Abdominal bloating for a few weeks.  Normal BM's.  Bloating is worse after he eats.  Feels full more often and less hungry.  Hasn't had cscope yet --hasn't completed paperwork.  No diarrhea.  No vomiting.  No weight loss.  Walking for exercise.  Some GERD sx's at times.    The following portions of the patient's history were reviewed and updated as appropriate: allergies, current medications, past family history, past medical history, past social history, past surgical history and problem list.    Review of Systems   Constitutional: Negative for fever.   HENT: Positive for postnasal drip and rhinorrhea.    Respiratory: Positive for cough.        Vitals:    07/17/19 1336   BP: 118/72   Pulse: 96   Resp: 14   Temp: 98.8 °F (37.1 °C)   SpO2: 99%       Objective   Physical Exam   Constitutional: He is oriented to person, place, and time. He appears well-developed and well-nourished.   HENT:   Head: Normocephalic and atraumatic.   Right Ear: External ear normal.   Left Ear: External ear normal.   Mouth/Throat: Oropharynx is clear and moist.   Eyes: Conjunctivae are normal.   Neck: Neck supple.   Cardiovascular: Normal rate, regular rhythm and normal heart sounds.   No bruits   Pulmonary/Chest: Effort normal and breath sounds normal. No respiratory distress. He has no wheezes. He has no rales.   Abdominal: Soft. Bowel sounds are normal. He exhibits no distension and no mass. There is no tenderness.    Rashid had a diabetic foot exam performed today.   During the foot exam he had a monofilament test performed.  Skin Integrity  -  His right foot skin is intact.  He has no right foot onychomycosis, no  right foot ulcer and non-callous right foot.His left foot skin is intact. He has no left foot onychomycosis, no left foot ulcer and non-callous left foot..  Lymphadenopathy:     He has no cervical adenopathy.   Neurological: He is alert and oriented to person, place, and time.   Skin: Skin is warm.   Psychiatric: He has a normal mood and affect. His behavior is normal. Judgment and thought content normal.   Nursing note and vitals reviewed.        Current Outpatient Medications:   •  ACCU-CHEK COMPACT PLUS test strip, TEST FAST BLOOD SUGAR D, Disp: , Rfl: 3  •  fluticasone (FLONASE) 50 MCG/ACT nasal spray, 2 sprays into each nostril daily., Disp: , Rfl:   •  JANUMET  MG per tablet, TAKE 1 TABLET BY MOUTH TWICE A DAY, Disp: 60 tablet, Rfl: 3  •  JARDIANCE 10 MG tablet, TAKE 1 TABLET BY MOUTH EVERY DAY, Disp: 30 tablet, Rfl: 5  •  lisinopril (PRINIVIL,ZESTRIL) 10 MG tablet, TAKE 1 TABLET BY MOUTH EVERY DAY, Disp: 90 tablet, Rfl: 0  •  meclizine (ANTIVERT) 25 MG tablet, Take 1 tablet by mouth 3 (Three) Times a Day As Needed for dizziness., Disp: 30 tablet, Rfl: 0  •  Omega-3 Fatty Acids (FISH OIL) 1000 MG capsule capsule, Take 3 capsules by mouth daily., Disp: , Rfl:   •  triamcinolone (KENALOG) 0.1 % cream, Apply topically 2 (two) times a day. Apply sparingly and massage in, Disp: , Rfl:   •  valACYclovir (VALTREX) 500 MG tablet, TAKE 1 TABLET BY MOUTH TWICE DAILY FOR 3 DAYS, WITHIN 24 HOURS OF FIRST SIGN OF OUTBREAK, Disp: 6 tablet, Rfl: 0    Assessment/Plan   Rashid was seen today for hyperlipidemia and diabetes.    Diagnoses and all orders for this visit:    Hyperlipidemia, unspecified hyperlipidemia type    Essential hypertension    Type 2 diabetes mellitus without complication, without long-term current use of insulin (CMS/[facility])      Patient admits that his diet has not been well controlled.  He states that he would prefer to not increase his medication at this time but would rather work on diet and exercise.   We did spend quite a bit of time talking about how to eliminate excess calories and reduce fat and sugar grams in his diet.  Weight loss would greatly benefit him.  His triglycerides were a little high and I am wondering if some of his gastric bloating is related to possibly a gallbladder issue.  He is past due on his colonoscopy.  I had the order placed and he did receive the paperwork but he has not completed it.  I have encouraged him to complete the paperwork and actually to also mention that he is having the abdominal bloating so that may be an EGD could be done at the same time.  He is get a semi-back note or an email the next couple weeks with continued gastric symptoms and have asked him to watch and monitor how his diet positively or negatively affects the bloating.  An ultrasound of his gallbladder may be indicated.  He is going to continue the Mucinex for the cold symptoms.  He will continue lisinopril for hypertension and the Janumet and Jardiance for his diabetes.  He is supposed to contact me in the next week with persistent viral symptoms and/or persistent abdominal bloating symptoms so we know what steps to take next.  Again encouraged was endoscopy and he has to complete that paperwork and fill it out and send it in.             I have asked for the patient to return to clinic in 6month(s).   none

## 2023-11-08 ENCOUNTER — OFFICE VISIT (OUTPATIENT)
Dept: SURGERY | Facility: CLINIC | Age: 58
End: 2023-11-08
Payer: COMMERCIAL

## 2023-11-08 VITALS
DIASTOLIC BLOOD PRESSURE: 80 MMHG | BODY MASS INDEX: 37.65 KG/M2 | HEIGHT: 70 IN | WEIGHT: 263 LBS | SYSTOLIC BLOOD PRESSURE: 142 MMHG

## 2023-11-08 DIAGNOSIS — K22.89 ESOPHAGEAL THICKENING: Primary | ICD-10-CM

## 2023-11-08 PROCEDURE — 99203 OFFICE O/P NEW LOW 30 MIN: CPT | Performed by: SURGERY

## 2023-11-08 NOTE — PROGRESS NOTES
General Surgery H&P/Consultation    Impression/Plan:    Mr. Rashid Henderson is a 58 y.o. with history of benign Schatzki ring and hiatal hernia.  Esophageal thickening on recent CT scan.  Recommend proceeding with EGD for evaluation and possible dilation if narrowing.  Risks, benefits, and alternatives were explained to the patient who agreed and wished to proceed.    Referring Provider: No ref. provider found    Chief Complaint:    Esophageal thickening on CT imaging    History of Present Illness:    Mr. Rashid Henderson is a 58 y.o. gentleman referred by Dr. Nyla Al for evaluation of esophageal thickening on recent CT chest with EGD.  This was completed for productive cough.  He does have a history of GERD and symptoms are well managed on daily Prilosec.  He has previously undergone an upper endoscopy by Dr. Grant which demonstrated a Schatzki ring which was dilated.  Small hiatal hernia was present at that time.  Biopsy of the stomach demonstrated focally active mild chronic inflammation at that time.  There was no H. pylori identified.  Currently denies dysphagia or difficulty swallowing.    Past Medical History:   Past Medical History:   Diagnosis Date    Abnormal electrocardiogram 01/19/2016    Abnormal LFTs (liver function tests)     suspected fatty liver dz    Allergic     Eczema 01/19/2016    Esophageal stricture     Essential hypertension, benign     GERD (gastroesophageal reflux disease)     Hypertriglyceridemia     Obesity     KATTY (obstructive sleep apnea)     Type 2 diabetes mellitus with hyperglycemia 2010          Past Surgical History:    Past Surgical History:   Procedure Laterality Date    COLONOSCOPY  approx 2019    benign polyps per pt     COLONOSCOPY  08/01/2023    ENDOSCOPY N/A 02/15/2021    Procedure: ESOPHAGOGASTRODUODENOSCOPY WITH REMOVAL OF FOOD BOLUS;  Surgeon: Santo Grant MD;  Location: [facility];  Service: Gastroenterology;  Laterality: N/A;    ENDOSCOPY N/A 03/24/2021     Procedure: ESOPHAGOGASTRODUODENOSCOPY WITH 15-18MM BALLOON DILATATION AND BIOPSIES;  Surgeon: Santo Grant MD;  Location: [facility] ENDOSCOPY;  Service: Gastroenterology;  Laterality: N/A;  PRE- ESOPHAGEAL STRICTURE  POST- HIATAL HERNIA, ESOPHAGEAL STRICTURE, GASTRITIS    LASIK      VASECTOMY           Family History:    Family History   Problem Relation Age of Onset    Other Mother         Heart    Diabetes Father     Diabetes Brother     Hyperlipidemia Brother     Cancer Maternal Grandmother     Alcohol abuse Maternal Grandfather     Cancer Paternal Grandmother     Diabetes Paternal Grandfather     Diabetes Sister     Diabetes Brother     Malig Hyperthermia Neg Hx          Social History:    Social History     Socioeconomic History    Marital status:    Tobacco Use    Smoking status: Never     Passive exposure: Never    Smokeless tobacco: Never   Vaping Use    Vaping Use: Never used   Substance and Sexual Activity    Alcohol use: Yes     Comment: SOC    Drug use: No    Sexual activity: Yes     Partners: Female     Birth control/protection: None         Allergies:   No Known Allergies    Medications:     Current Outpatient Medications:     albuterol sulfate  (90 Base) MCG/ACT inhaler, INHALE 2 PUFFS BY MOUTH EVERY 4 HOURS AS NEEDED FOR WHEEZING, Disp: 18 g, Rfl: 3    budesonide-formoterol (Symbicort) 80-4.5 MCG/ACT inhaler, Inhale 2 puffs 2 (Two) Times a Day., Disp: 10.2 g, Rfl: 12    empagliflozin (Jardiance) 25 MG tablet tablet, By mouth take one tab daily in AM., Disp: 90 tablet, Rfl: 2    fenofibrate (TRICOR) 145 MG tablet, By mouth take one tab daily., Disp: 90 tablet, Rfl: 2    fluticasone (FLONASE) 50 MCG/ACT nasal spray, 2 sprays into the nostril(s) as directed by provider Daily. (Patient taking differently: 2 sprays into the nostril(s) as directed by provider As Needed.), Disp: 1 bottle, Rfl: 0    glimepiride (Amaryl) 2 MG tablet, By mouth take one tab twice daily with AM and PM meals.,  Disp: 180 tablet, Rfl: 2    glucose blood (OneTouch Verio) test strip, Check once a day not on insulin tx, poor control, hypoglycemia. Dx: E 11.65, Disp: 100 each, Rfl: 4    Insulin Pen Needle (B-D UF III MINI PEN NEEDLES) 31G X 5 MM misc, For use with GLP-1 pen device to inject based on frequency of medication., Disp: 100 each, Rfl: 4    Liraglutide (Victoza) 18 MG/3ML solution pen-injector injection, In AM inject 1.8 mg daily., Disp: 27 mL, Rfl: 2    losartan (Cozaar) 25 MG tablet, Take 1 tablet by mouth Daily., Disp: 90 tablet, Rfl: 1    metFORMIN ER (GLUCOPHAGE-XR) 500 MG 24 hr tablet, By mouth take 2 tabs twice daily after AM & PM meals., Disp: 360 tablet, Rfl: 2    Omega-3 Fatty Acids (FISH OIL) 1000 MG capsule capsule, Take 3 capsules by mouth Daily., Disp: , Rfl:     omeprazole (priLOSEC) 20 MG capsule, TAKE 2 CAPSULES BY MOUTH DAILY, Disp: 180 capsule, Rfl: 1    valACYclovir (VALTREX) 500 MG tablet, TAKE 1 TABLET BY MOUTH TWICE DAILY FOR 3 DAYS, Disp: 6 tablet, Rfl: 0    Radiology/Endoscopy:    CT chest reviewed showing distal esophageal thickening    Labs:    Labs from 10/31/2023 reviewed        Physical Exam:   Constitutional: Well-developed well-nourished, no acute distress   Respiratory: No increased work of breathing, Symmetric excursion  Cardiovascular: Well perfused, no jugular venous distention evident   Gastrointestinal: Soft, nontender        Christopher Taylor MD  General and Endoscopic Surgery  [facility]    [address], [address]  [place], [place], [address]  P: [phone]  F: [phone]

## 2023-11-21 ENCOUNTER — TELEPHONE (OUTPATIENT)
Dept: SURGERY | Facility: CLINIC | Age: 58
End: 2023-11-21
Payer: COMMERCIAL

## 2023-11-21 NOTE — TELEPHONE ENCOUNTER
Patient called to postpone his EGD for Dec. Wants to schedule in 2024. We will call patient back to schedule once we can start scheduling for 2024

## 2023-12-19 ENCOUNTER — HOSPITAL ENCOUNTER (OUTPATIENT)
Dept: CT IMAGING | Facility: HOSPITAL | Age: 58
Discharge: HOME | End: 2023-12-19
Admitting: INTERNAL MEDICINE

## 2023-12-19 DIAGNOSIS — I25.10 CORONARY ARTERY CALCIFICATION SEEN ON CT SCAN: ICD-10-CM

## 2023-12-19 PROCEDURE — 75571 CT HRT W/O DYE W/CA TEST: CPT

## 2023-12-20 DIAGNOSIS — R05.1 ACUTE COUGH: ICD-10-CM

## 2023-12-20 NOTE — PROGRESS NOTES
I called to go over the results of the patient's coronary calcium CT scan.  I left a message asking for call back to ensure that he is not having any new symptoms.  Also to discuss starting him on a low-dose statin in light of his elevated calcium score.  I asked him to call back so we can go over the results and discuss my recommendations.

## 2023-12-21 RX ORDER — BUDESONIDE AND FORMOTEROL FUMARATE DIHYDRATE 80; 4.5 UG/1; UG/1
2 AEROSOL RESPIRATORY (INHALATION) 2 TIMES DAILY
Qty: 10.2 G | Refills: 12 | Status: SHIPPED | OUTPATIENT
Start: 2023-12-21

## 2023-12-22 ENCOUNTER — TRANSCRIBE ORDERS (OUTPATIENT)
Dept: ADMINISTRATIVE | Facility: HOSPITAL | Age: 58
End: 2023-12-22
Payer: COMMERCIAL

## 2023-12-22 DIAGNOSIS — J84.9 ILD (INTERSTITIAL LUNG DISEASE): Primary | ICD-10-CM

## 2023-12-22 NOTE — TELEPHONE ENCOUNTER
Got a request from [facility] to send in BREYNA INHALER .  has sent in SYMBICORT

## 2024-01-18 ENCOUNTER — HOSPITAL ENCOUNTER (OUTPATIENT)
Dept: CT IMAGING | Facility: HOSPITAL | Age: 59
Discharge: HOME | End: 2024-01-18
Admitting: INTERNAL MEDICINE
Payer: COMMERCIAL

## 2024-01-18 DIAGNOSIS — J84.9 ILD (INTERSTITIAL LUNG DISEASE): ICD-10-CM

## 2024-01-18 PROCEDURE — 71250 CT THORAX DX C-: CPT

## 2024-01-22 ENCOUNTER — TELEPHONE (OUTPATIENT)
Dept: FAMILY MEDICINE CLINIC | Facility: CLINIC | Age: 59
End: 2024-01-22
Payer: COMMERCIAL

## 2024-01-22 DIAGNOSIS — J84.9 INTERSTITIAL LUNG DISEASE: Primary | ICD-10-CM

## 2024-01-22 NOTE — TELEPHONE ENCOUNTER
PA submitted for Symbicort. This would need to be changed to Wixela or Breo for coverage. Chart does not show any trials with either drug. Please advise

## 2024-01-23 DIAGNOSIS — E78.5 HYPERLIPIDEMIA, UNSPECIFIED HYPERLIPIDEMIA TYPE: ICD-10-CM

## 2024-01-23 DIAGNOSIS — E11.9 TYPE 2 DIABETES MELLITUS WITHOUT COMPLICATION, WITHOUT LONG-TERM CURRENT USE OF INSULIN: ICD-10-CM

## 2024-01-23 DIAGNOSIS — I10 ESSENTIAL HYPERTENSION: ICD-10-CM

## 2024-01-23 RX ORDER — FLUTICASONE FUROATE AND VILANTEROL 100; 25 UG/1; UG/1
1 POWDER RESPIRATORY (INHALATION)
Qty: 60 EACH | Refills: 5 | Status: SHIPPED | OUTPATIENT
Start: 2024-01-23

## 2024-01-24 LAB
ALBUMIN SERPL-MCNC: 4.5 G/DL (ref 3.5–5.2)
ALBUMIN/GLOB SERPL: 1.9 G/DL
ALP SERPL-CCNC: 39 U/L (ref 39–117)
ALT SERPL-CCNC: 38 U/L (ref 1–41)
AST SERPL-CCNC: 23 U/L (ref 1–40)
BASOPHILS # BLD AUTO: 0.03 10*3/MM3 (ref 0–0.2)
BASOPHILS NFR BLD AUTO: 0.5 % (ref 0–1.5)
BILIRUB SERPL-MCNC: 0.5 MG/DL (ref 0–1.2)
BUN SERPL-MCNC: 23 MG/DL (ref 6–20)
BUN/CREAT SERPL: 20.7 (ref 7–25)
CALCIUM SERPL-MCNC: 9.4 MG/DL (ref 8.6–10.5)
CHLORIDE SERPL-SCNC: 101 MMOL/L (ref 98–107)
CHOLEST SERPL-MCNC: 185 MG/DL (ref 0–200)
CO2 SERPL-SCNC: 23.7 MMOL/L (ref 22–29)
CREAT SERPL-MCNC: 1.11 MG/DL (ref 0.76–1.27)
EGFRCR SERPLBLD CKD-EPI 2021: 77.9 ML/MIN/1.73
EOSINOPHIL # BLD AUTO: 0.31 10*3/MM3 (ref 0–0.4)
EOSINOPHIL NFR BLD AUTO: 5.1 % (ref 0.3–6.2)
ERYTHROCYTE [DISTWIDTH] IN BLOOD BY AUTOMATED COUNT: 13 % (ref 12.3–15.4)
GLOBULIN SER CALC-MCNC: 2.4 GM/DL
GLUCOSE SERPL-MCNC: 103 MG/DL (ref 65–99)
HBA1C MFR BLD: 6.7 % (ref 4.8–5.6)
HCT VFR BLD AUTO: 45.5 % (ref 37.5–51)
HDLC SERPL-MCNC: 73 MG/DL (ref 40–60)
HGB BLD-MCNC: 15.4 G/DL (ref 13–17.7)
IMM GRANULOCYTES # BLD AUTO: 0.03 10*3/MM3 (ref 0–0.05)
IMM GRANULOCYTES NFR BLD AUTO: 0.5 % (ref 0–0.5)
LDLC SERPL CALC-MCNC: 91 MG/DL (ref 0–100)
LDLC/HDLC SERPL: 1.2 {RATIO}
LYMPHOCYTES # BLD AUTO: 2.26 10*3/MM3 (ref 0.7–3.1)
LYMPHOCYTES NFR BLD AUTO: 37.1 % (ref 19.6–45.3)
MCH RBC QN AUTO: 28.9 PG (ref 26.6–33)
MCHC RBC AUTO-ENTMCNC: 33.8 G/DL (ref 31.5–35.7)
MCV RBC AUTO: 85.5 FL (ref 79–97)
MONOCYTES # BLD AUTO: 0.49 10*3/MM3 (ref 0.1–0.9)
MONOCYTES NFR BLD AUTO: 8 % (ref 5–12)
NEUTROPHILS # BLD AUTO: 2.97 10*3/MM3 (ref 1.7–7)
NEUTROPHILS NFR BLD AUTO: 48.8 % (ref 42.7–76)
NRBC BLD AUTO-RTO: 0 /100 WBC (ref 0–0.2)
PLATELET # BLD AUTO: 302 10*3/MM3 (ref 140–450)
POTASSIUM SERPL-SCNC: 4.5 MMOL/L (ref 3.5–5.2)
PROT SERPL-MCNC: 6.9 G/DL (ref 6–8.5)
RBC # BLD AUTO: 5.32 10*6/MM3 (ref 4.14–5.8)
SODIUM SERPL-SCNC: 138 MMOL/L (ref 136–145)
T4 FREE SERPL-MCNC: 1.64 NG/DL (ref 0.93–1.7)
TRIGL SERPL-MCNC: 121 MG/DL (ref 0–150)
TSH SERPL DL<=0.005 MIU/L-ACNC: 3.88 UIU/ML (ref 0.27–4.2)
VLDLC SERPL CALC-MCNC: 21 MG/DL (ref 5–40)
WBC # BLD AUTO: 6.09 10*3/MM3 (ref 3.4–10.8)

## 2024-01-24 RX ORDER — ATORVASTATIN CALCIUM 10 MG/1
10 TABLET, FILM COATED ORAL DAILY
Qty: 30 TABLET | Refills: 5 | Status: SHIPPED | OUTPATIENT
Start: 2024-01-24

## 2024-01-25 NOTE — SIGNIFICANT NOTE
Education provided the Patient on the following:    - Nothing to Eat or Drink after MN the night before the procedure  -You will need to have someone drive you home after your EGD and remain with you for 24 hours after the EGD  - The date of your EGD, your are welcome to have one visitor at bedside or remain within 10-15 minutes of [facility]  -Please wear warm socks when you arrive for your EGD  -Remove all jewelry and leave any valuables before arriving on the date of your procedure (all will have to be removed before leaving preop)  -You will need to arrive at 1100 on 1 26/24 EGD  -Feel free to contact us at: [phone] with any additional questions/concerns

## 2024-01-26 ENCOUNTER — ANESTHESIA EVENT (OUTPATIENT)
Dept: SURGERY | Facility: SURGERY CENTER | Age: 59
End: 2024-01-26
Payer: COMMERCIAL

## 2024-01-26 ENCOUNTER — HOSPITAL ENCOUNTER (OUTPATIENT)
Facility: SURGERY CENTER | Age: 59
Setting detail: HOSPITAL OUTPATIENT SURGERY
Discharge: HOME | End: 2024-01-26
Attending: SURGERY | Admitting: SURGERY
Payer: COMMERCIAL

## 2024-01-26 ENCOUNTER — ANESTHESIA (OUTPATIENT)
Dept: SURGERY | Facility: SURGERY CENTER | Age: 59
End: 2024-01-26
Payer: COMMERCIAL

## 2024-01-26 VITALS
OXYGEN SATURATION: 94 % | WEIGHT: 267 LBS | RESPIRATION RATE: 20 BRPM | HEART RATE: 74 BPM | SYSTOLIC BLOOD PRESSURE: 133 MMHG | DIASTOLIC BLOOD PRESSURE: 85 MMHG | HEIGHT: 70 IN | TEMPERATURE: 98.6 F | BODY MASS INDEX: 38.22 KG/M2

## 2024-01-26 DIAGNOSIS — K22.89 ESOPHAGEAL THICKENING: ICD-10-CM

## 2024-01-26 LAB — GLUCOSE BLDC GLUCOMTR-MCNC: 125 MG/DL (ref 70–130)

## 2024-01-26 PROCEDURE — 25810000003 LACTATED RINGERS PER 1000 ML: Performed by: SURGERY

## 2024-01-26 PROCEDURE — 25010000002 PROPOFOL 200 MG/20ML EMULSION: Performed by: ANESTHESIOLOGY

## 2024-01-26 PROCEDURE — 88313 SPECIAL STAINS GROUP 2: CPT | Performed by: SURGERY

## 2024-01-26 PROCEDURE — 43239 EGD BIOPSY SINGLE/MULTIPLE: CPT | Performed by: SURGERY

## 2024-01-26 PROCEDURE — 88305 TISSUE EXAM BY PATHOLOGIST: CPT | Performed by: SURGERY

## 2024-01-26 RX ORDER — LIDOCAINE HYDROCHLORIDE 10 MG/ML
0.5 INJECTION, SOLUTION INFILTRATION; PERINEURAL ONCE AS NEEDED
Status: DISCONTINUED | OUTPATIENT
Start: 2024-01-26 | End: 2024-01-26 | Stop reason: HOSPADM

## 2024-01-26 RX ORDER — SODIUM CHLORIDE, SODIUM LACTATE, POTASSIUM CHLORIDE, CALCIUM CHLORIDE 600; 310; 30; 20 MG/100ML; MG/100ML; MG/100ML; MG/100ML
1000 INJECTION, SOLUTION INTRAVENOUS CONTINUOUS
Status: DISCONTINUED | OUTPATIENT
Start: 2024-01-26 | End: 2024-01-26 | Stop reason: HOSPADM

## 2024-01-26 RX ORDER — PROPOFOL 10 MG/ML
INJECTION, EMULSION INTRAVENOUS CONTINUOUS PRN
Status: DISCONTINUED | OUTPATIENT
Start: 2024-01-26 | End: 2024-01-26 | Stop reason: SURG

## 2024-01-26 RX ORDER — SODIUM CHLORIDE 0.9 % (FLUSH) 0.9 %
10 SYRINGE (ML) INJECTION AS NEEDED
Status: DISCONTINUED | OUTPATIENT
Start: 2024-01-26 | End: 2024-01-26 | Stop reason: HOSPADM

## 2024-01-26 RX ORDER — MAGNESIUM HYDROXIDE 1200 MG/15ML
LIQUID ORAL AS NEEDED
Status: DISCONTINUED | OUTPATIENT
Start: 2024-01-26 | End: 2024-01-26 | Stop reason: HOSPADM

## 2024-01-26 RX ADMIN — PROPOFOL 200 MCG/KG/MIN: 10 INJECTION, EMULSION INTRAVENOUS at 12:11

## 2024-01-26 RX ADMIN — SODIUM CHLORIDE, POTASSIUM CHLORIDE, SODIUM LACTATE AND CALCIUM CHLORIDE 1000 ML: 600; 310; 30; 20 INJECTION, SOLUTION INTRAVENOUS at 11:29

## 2024-01-26 NOTE — ANESTHESIA PREPROCEDURE EVALUATION
Anesthesia Evaluation     NPO Solid Status: > 8 hours  NPO Liquid Status: > 8 hours           Airway   Mallampati: I  No difficulty expected  Dental      Pulmonary    (+) ,sleep apnea  Cardiovascular     (+) hypertension, CAD, hyperlipidemia      Neuro/Psych  GI/Hepatic/Renal/Endo    (+) obesity, GERD, diabetes mellitus    Musculoskeletal     Abdominal    Substance History      OB/GYN          Other                    Anesthesia Plan    ASA 3     MAC     intravenous induction     Anesthetic plan, risks, benefits, and alternatives have been provided, discussed and informed consent has been obtained with: patient.    CODE STATUS:

## 2024-01-26 NOTE — OP NOTE
EGD procedure Note  Rashid Henderson  1965  Date of Procedure: 01/26/24    Pre-operative Diagnosis:    GERD, abnormal imaging of the esophagus    Post-operative Diagnosis:  GERD, hiatal hernia    Procedure: Esophagogastroduodenoscopy with antral and GE junction biopsy    Findings/Treatments:   Suggestion of mild erythema in the antrum biopsied  Slight irregular appearance to the GE junction, no stricturing or narrowing, circumferential biopsies obtained from GE junction  2 cm sliding-type hiatal hernia       Recommendations:   Continue PPI.  The office will call within the next  3-10 days with a final recommendation.    Surgeon: Christopher Taylor MD    Anesthetic: MAC per Reilly Ferreira MD      Procedure Details:    MAC anesthesia was induced.  Gastroscope was inserted under direct visualization and advanced to the third portion of the duodenum.  It was normal in appearance.  It was withdrawn into the stomach and retroflexed.  A small sliding hiatal hernia was evident.  A slightly erythematous appearing area within the antrum was biopsied with a cold forcep.  The hiatus was present at 39 cm from the incisors, the GE junction was 37 cm from the incisors.  The Z-line was slightly irregular in appearance and biopsies were obtained circumferentially.  There was no evidence of a Schatzki ring present on today's examination.  The stomach was desufflated and scope withdrawn.    Christopher Taylor M.D.  General, Endoscopic, and Robotic Surgery  [facility]    [address] [place] [address], [address]  [place], [place], [address]  P: [phone]  F: [phone]

## 2024-01-26 NOTE — H&P
General Surgery H&P/Consultation      Impression/Plan: 58-year-old gentleman with history of benign Schatzki ring and hiatal hernia.  He had esophageal thickening noted on a CT scan.  Recommend proceeding with EGD for evaluation and possible dilation if narrowing.  Risks, benefits, rationale for the procedure were discussed with him and he is in agreement to proceed.    CC: Esophageal thickening on CT imaging    HPI:   Mr. Rashid Henderson is a 58 y.o. male that presented to the hospital for EGD with possible balloon dilation.  He underwent a CT chest without contrast on 9/14/2023 which showed small hiatal hernia with thickening of the distal esophagus.  He has a history of GERD and symptoms are well-managed on daily Prilosec.  He has undergone endoscopy with Dr. Grant previously and a Schatzki ring was dilated.    Past Medical History:   Past Medical History:   Diagnosis Date    Abnormal electrocardiogram 01/19/2016    Abnormal LFTs (liver function tests)     suspected fatty liver dz    Allergic     Eczema 01/19/2016    Esophageal stricture     Essential hypertension, benign     GERD (gastroesophageal reflux disease)     Hypertriglyceridemia     Obesity     KATTY (obstructive sleep apnea)     Type 2 diabetes mellitus with hyperglycemia 2010       Past Surgical History:   Past Surgical History:   Procedure Laterality Date    COLONOSCOPY  approx 2019    benign polyps per pt     COLONOSCOPY  08/01/2023    ENDOSCOPY N/A 02/15/2021    Procedure: ESOPHAGOGASTRODUODENOSCOPY WITH REMOVAL OF FOOD BOLUS;  Surgeon: Santo Grant MD;  Location: [facility];  Service: Gastroenterology;  Laterality: N/A;    ENDOSCOPY N/A 03/24/2021    Procedure: ESOPHAGOGASTRODUODENOSCOPY WITH 15-18MM BALLOON DILATATION AND BIOPSIES;  Surgeon: Satno Grant MD;  Location: [facility] ENDOSCOPY;  Service: Gastroenterology;  Laterality: N/A;  PRE- ESOPHAGEAL STRICTURE  POST- HIATAL HERNIA, ESOPHAGEAL STRICTURE, GASTRITIS    LASIK       VASECTOMY         Medications:  Medications Prior to Admission   Medication Sig Dispense Refill Last Dose    atorvastatin (LIPITOR) 10 MG tablet Take 1 tablet by mouth Daily. (Patient taking differently: Take 1 tablet by mouth Daily. Hasn't started yet) 30 tablet 5 Patient Taking Differently    empagliflozin (Jardiance) 25 MG tablet tablet By mouth take one tab daily in AM. 90 tablet 2 1/25/2024    fenofibrate (TRICOR) 145 MG tablet By mouth take one tab daily. 90 tablet 2 Past Week    glimepiride (Amaryl) 2 MG tablet By mouth take one tab twice daily with AM and PM meals. 180 tablet 2 1/25/2024    glucose blood (OneTouch Verio) test strip Check once a day not on insulin tx, poor control, hypoglycemia. Dx: E 11.65 100 each 4 1/25/2024    Insulin Pen Needle (B-D UF III MINI PEN NEEDLES) 31G X 5 MM misc For use with GLP-1 pen device to inject based on frequency of medication. 100 each 4 1/25/2024    Liraglutide (Victoza) 18 MG/3ML solution pen-injector injection In AM inject 1.8 mg daily. 27 mL 2 1/25/2024    losartan (Cozaar) 25 MG tablet Take 1 tablet by mouth Daily. 90 tablet 1 1/25/2024    metFORMIN ER (GLUCOPHAGE-XR) 500 MG 24 hr tablet By mouth take 2 tabs twice daily after AM & PM meals. 360 tablet 2 1/25/2024    Omega-3 Fatty Acids (FISH OIL) 1000 MG capsule capsule Take 3 capsules by mouth Daily.   1/25/2024    omeprazole (priLOSEC) 20 MG capsule TAKE 2 CAPSULES BY MOUTH DAILY 180 capsule 1 1/25/2024    albuterol sulfate  (90 Base) MCG/ACT inhaler INHALE 2 PUFFS BY MOUTH EVERY 4 HOURS AS NEEDED FOR WHEEZING 18 g 3 More than a month    fluticasone (FLONASE) 50 MCG/ACT nasal spray 2 sprays into the nostril(s) as directed by provider Daily. (Patient taking differently: 2 sprays into the nostril(s) as directed by provider As Needed.) 1 bottle 0 More than a month    Fluticasone Furoate-Vilanterol 100-25 MCG/ACT aerosol powder  Inhale 1 puff Daily. 60 each 5 More than a month    valACYclovir (VALTREX) 500 MG  tablet TAKE 1 TABLET BY MOUTH TWICE DAILY FOR 3 DAYS 6 tablet 0 More than a month         Current Facility-Administered Medications:     lactated ringers infusion 1,000 mL, 1,000 mL, Intravenous, Continuous, Christopher Taylor MD, Last Rate: 25 mL/hr at 01/26/24 1129, 1,000 mL at 01/26/24 1129    lidocaine (XYLOCAINE) 1 % injection 0.5 mL, 0.5 mL, Intradermal, Once PRN, Christopher Taylor MD    sodium chloride 0.9 % flush 10 mL, 10 mL, Intravenous, PRN, Christopher Taylor MD    sterile water irrigation solution, , , PRN, Christopher Taylor MD, 500 mL at 01/26/24 1150     Allergies: No Known Allergies    Social History:   Social History     Socioeconomic History    Marital status:    Tobacco Use    Smoking status: Never     Passive exposure: Never    Smokeless tobacco: Never   Vaping Use    Vaping Use: Never used   Substance and Sexual Activity    Alcohol use: Yes     Comment: SOC    Drug use: No    Sexual activity: Yes     Partners: Female     Birth control/protection: None       Family History:   Family History   Problem Relation Age of Onset    Other Mother         Heart    Diabetes Father     Diabetes Brother     Hyperlipidemia Brother     Cancer Maternal Grandmother     Alcohol abuse Maternal Grandfather     Cancer Paternal Grandmother     Diabetes Paternal Grandfather     Diabetes Sister     Diabetes Brother     Malig Hyperthermia Neg Hx      Physical Exam:   Vitals:    01/26/24 1125   BP: 147/87   Pulse: 79   Resp: 16   Temp: 98 °F (36.7 °C)   SpO2: 98%     BMI: Body mass index is 38.31 kg/m².   121 kg (267 lb)    No intake or output data in the 24 hours ending 01/26/24 1209    GENERAL: no acute distress, awake and alert  RESPIRATORY: symmetric excursion bilaterally, normal work of breathing  CARDIOVASCULAR: Regular rate, well perfused  GASTROINTESTINAL: Obese, nondistended      Pertinent labs:   Results from last 7 days   Lab Units 01/23/24  0821   WBC 10*3/mm3 6.09   HEMOGLOBIN g/dL 15.4   HEMATOCRIT %  45.5   PLATELETS 10*3/mm3 302     Results from last 7 days   Lab Units 01/23/24  0821   SODIUM mmol/L 138   POTASSIUM mmol/L 4.5   CHLORIDE mmol/L 101   CO2 mmol/L 23.7   BUN mg/dL 23*   CREATININE mg/dL 1.11   CALCIUM mg/dL 9.4   BILIRUBIN mg/dL 0.5   ALK PHOS U/L 39   ALT (SGPT) U/L 38   AST (SGOT) U/L 23   GLUCOSE mg/dL 103*       IMAGING:  CT chest from 9/14/2023 with fascial thickening associated with small hiatal hernia.  I do not see obvious thickening on his most recent CT chest from 1/18/2024          Christopher Taylor MD  General and Endoscopic Surgery  [facility]    [address], [address]  [place] [address]  P: [phone]  F: [phone]

## 2024-01-26 NOTE — ANESTHESIA POSTPROCEDURE EVALUATION
Patient: Rashid Henderson    Procedure Summary       Date: 01/26/24 Room / Location: [facility]  / [facility]    Anesthesia Start: 1211 Anesthesia Stop: 1228    Procedure: ESOPHAGOGASTRODUODENOSCOPY with biopsies (Esophagus) Diagnosis:       Esophageal thickening      (Esophageal thickening [K22.89])    Surgeons: Christopher Taylor MD Provider: Reilly Ferreira MD    Anesthesia Type: MAC ASA Status: 3            Anesthesia Type: MAC    Vitals  Vitals Value Taken Time   /85 01/26/24 1241   Temp 37 °C (98.6 °F) 01/26/24 1226   Pulse 74 01/26/24 1241   Resp 20 01/26/24 1241   SpO2 94 % 01/26/24 1241           Post Anesthesia Care and Evaluation    Patient location during evaluation: PACU  Patient participation: complete - patient participated  Level of consciousness: awake  Pain management: adequate    Airway patency: patent  Anesthetic complications: No anesthetic complications  PONV Status: none  Cardiovascular status: acceptable  Respiratory status: acceptable  Hydration status: acceptable    Comments: Patient seen and examined postoperatively; vital signs stable; SpO2 greater than or equal to 90%; cardiopulmonary status stable; nausea/vomiting adequately controlled; pain adequately controlled; no apparent anesthesia complications; patient discharged from anesthesia care when discharge criteria were met

## 2024-01-30 ENCOUNTER — OFFICE VISIT (OUTPATIENT)
Dept: FAMILY MEDICINE CLINIC | Facility: CLINIC | Age: 59
End: 2024-01-30
Payer: COMMERCIAL

## 2024-01-30 VITALS
OXYGEN SATURATION: 97 % | TEMPERATURE: 98.3 F | HEIGHT: 70 IN | SYSTOLIC BLOOD PRESSURE: 120 MMHG | WEIGHT: 270.7 LBS | HEART RATE: 76 BPM | BODY MASS INDEX: 38.75 KG/M2 | DIASTOLIC BLOOD PRESSURE: 74 MMHG

## 2024-01-30 DIAGNOSIS — Z23 ENCOUNTER FOR IMMUNIZATION: ICD-10-CM

## 2024-01-30 DIAGNOSIS — E11.65 TYPE 2 DIABETES MELLITUS WITH HYPERGLYCEMIA, WITHOUT LONG-TERM CURRENT USE OF INSULIN: ICD-10-CM

## 2024-01-30 DIAGNOSIS — Z00.00 WELL ADULT EXAM: ICD-10-CM

## 2024-01-30 DIAGNOSIS — I10 ESSENTIAL HYPERTENSION, BENIGN: Primary | ICD-10-CM

## 2024-01-30 LAB
LAB AP CASE REPORT: NORMAL
LAB AP CLINICAL INFORMATION: NORMAL
LAB AP DIAGNOSIS COMMENT: NORMAL
PATH REPORT.FINAL DX SPEC: NORMAL
PATH REPORT.GROSS SPEC: NORMAL

## 2024-01-30 NOTE — PROGRESS NOTES
"Chief Complaint  Annual Exam (Physical Exam )    Subjective        Rashid Henderson presents to [facility]  History of Present Illness  Here for CPE and f/u on NIDDM, HTN, HTG.  -120 in general.  GERD on omeprazole 20 mg qd.  On victoza, metformin, amaryl.  Victoza is on back order and is asking about mounjaro possibly.  They also won't cover symbicort and therefore he is now on breo daily with prn albuterol.  Dr. Monique has prescribed lipitor for LDL reduction and he will start that once he gets to pharm.    8/2023 CN  Seeing Dr. Cristobal Martinez 5/2024  PSA done yearly with urology  Saw pulmonologist for chronic cough and he suggested going w/o breo and he will f/u with him in 2 months.  Insurance not covering albuterol either.    Objective   Vital Signs:  /74 (BP Location: Left arm, Patient Position: Sitting, Cuff Size: Large Adult)   Pulse 76   Temp 98.3 °F (36.8 °C)   Ht 177.8 cm (70\")   Wt 123 kg (270 lb 11.2 oz)   SpO2 97%   BMI 38.84 kg/m²   Estimated body mass index is 38.84 kg/m² as calculated from the following:    Height as of this encounter: 177.8 cm (70\").    Weight as of this encounter: 123 kg (270 lb 11.2 oz).       Class 2 Severe Obesity (BMI >=35 and <=39.9). Obesity-related health conditions include the following: hypertension, diabetes mellitus, and dyslipidemias. Obesity is unchanged. BMI is is above average; BMI management plan is completed. We discussed low calorie, low carb based diet program, portion control, increasing exercise, and pharmacologic options including checking on mounjaro coverage .      Physical Exam  Vitals and nursing note reviewed.   Constitutional:       Appearance: Normal appearance. He is well-developed.   HENT:      Head: Normocephalic and atraumatic.      Right Ear: External ear normal.      Left Ear: External ear normal.   Eyes:      Extraocular Movements: Extraocular movements intact.      Conjunctiva/sclera: Conjunctivae " normal.   Neck:      Vascular: No carotid bruit.   Cardiovascular:      Rate and Rhythm: Normal rate and regular rhythm.      Heart sounds: Normal heart sounds.      Comments: No bruits  Pulmonary:      Effort: Pulmonary effort is normal. No respiratory distress.      Breath sounds: Normal breath sounds. No stridor. No wheezing, rhonchi or rales.   Chest:      Chest wall: No tenderness.   Abdominal:      General: Bowel sounds are normal. There is no distension.      Palpations: Abdomen is soft. There is no mass.      Tenderness: There is no abdominal tenderness. There is no guarding or rebound.      Hernia: No hernia is present.   Musculoskeletal:      Cervical back: Neck supple.   Lymphadenopathy:      Cervical: No cervical adenopathy.   Skin:     General: Skin is warm.   Neurological:      Mental Status: He is alert and oriented to person, place, and time. Mental status is at baseline.   Psychiatric:         Mood and Affect: Mood normal.         Behavior: Behavior normal.         Thought Content: Thought content normal.         Judgment: Judgment normal.        Result Review :                     Assessment and Plan     Diagnoses and all orders for this visit:    1. Essential hypertension, benign (Primary)  -     Comprehensive Metabolic Panel; Future  -     Hemoglobin A1c; Future  -     Lipid Panel With LDL / HDL Ratio; Future  -     TSH; Future  -     T4, Free; Future  -     MicroAlbumin, Urine, Random - Urine, Clean Catch; Future    2. Type 2 diabetes mellitus with hyperglycemia, without long-term current use of insulin  -     Comprehensive Metabolic Panel; Future  -     Hemoglobin A1c; Future  -     Lipid Panel With LDL / HDL Ratio; Future  -     TSH; Future  -     T4, Free; Future  -     MicroAlbumin, Urine, Random - Urine, Clean Catch; Future    3. Well adult exam    4. Encounter for immunization  -     Tdap Vaccine => 8yo IM (BOOSTRIX)    Tdap today,  Diabetic foot exam performed today  Fasting labs ordered  for 6-month follow-up along with kidney microalbumin screen  Is a followed by urology and is stable  Colonoscopy is up-to-date.  Recent EGD had tissue biopsy which are still pending.  Continue omeprazole at this point for hiatal hernia  CT scan showing pulmonary nodules and possible bronchiectasis is now being followed by pulmonary.  Repeat CT recommended in 6 months and this was reviewed with patient today in office.  Also, patient will reach out to his insurance to find out what medication is covered in place of albuterol since albuterol is helpful for him when he is feeling shortness of breath or cough.  Currently he is not taking Breo under pulmonary's guidance  Hypertension well-controlled on losartan 25 mg daily  Recent positive coronary artery calcification evaluated by cardiology.  Lipitor has been recommended.  Encourage patient to  medication.  He is also on Tricor.  He will need CMP CK and fasting lipid profile 4 to 6 weeks after he starts the Lipitor.  Diabetes is well-controlled.  Victoza is now no longer available.  Recommended patient call insurance company to see if Ozempic or Mounjaro is now covered under his plan.  Continue metformin and Amaryl and Jardiance for now.  Patient will report back to me once he knows what is covered.         Follow Up     No follow-ups on file.  Patient was given instructions and counseling regarding his condition or for health maintenance advice. Please see specific information pulled into the AVS if appropriate.

## 2024-02-20 DIAGNOSIS — R05.9 COUGH: ICD-10-CM

## 2024-02-20 DIAGNOSIS — R09.81 NASAL CONGESTION: ICD-10-CM

## 2024-02-20 RX ORDER — ALBUTEROL SULFATE 90 UG/1
2 AEROSOL, METERED RESPIRATORY (INHALATION) EVERY 4 HOURS PRN
Qty: 8.5 G | Refills: 2 | Status: SHIPPED | OUTPATIENT
Start: 2024-02-20

## 2024-02-20 NOTE — TELEPHONE ENCOUNTER
Rx Refill Note  Requested Prescriptions     Pending Prescriptions Disp Refills    albuterol sulfate  (90 Base) MCG/ACT inhaler [Pharmacy Med Name: ALBUTEROL HFA INH (200 PUFFS) 8.5GM] 8.5 g      Sig: INHALE 2 PUFFS BY MOUTH EVERY 4 HOURS AS NEEDED FOR WHEEZING      Last office visit with prescribing clinician: 1/30/2024   Last telemedicine visit with prescribing clinician: Visit date not found   Next office visit with prescribing clinician: 8/2/2024                         Would you like a call back once the refill request has been completed: [] Yes [] No    If the office needs to give you a call back, can they leave a voicemail: [] Yes [] No    José Luis Miguel MA  02/20/24, 08:28 EST

## 2024-03-18 RX ORDER — VALACYCLOVIR HYDROCHLORIDE 500 MG/1
TABLET, FILM COATED ORAL
Qty: 6 TABLET | Refills: 0 | Status: SHIPPED | OUTPATIENT
Start: 2024-03-18

## 2024-03-18 NOTE — TELEPHONE ENCOUNTER
Rx Refill Note  Requested Prescriptions     Pending Prescriptions Disp Refills    valACYclovir (VALTREX) 500 MG tablet [Pharmacy Med Name: VALACYCLOVIR 500MG TABLETS] 6 tablet 0     Sig: TAKE 1 TABLET BY MOUTH TWICE DAILY FOR 3 DAYS      Last office visit with prescribing clinician: 1/30/2024   Last telemedicine visit with prescribing clinician: Visit date not found   Next office visit with prescribing clinician: 8/2/2024                         Would you like a call back once the refill request has been completed: [] Yes [] No    If the office needs to give you a call back, can they leave a voicemail: [] Yes [] No    José Luis Miguel MA  03/18/24, 09:38 EDT

## 2024-03-31 DIAGNOSIS — I10 ESSENTIAL HYPERTENSION: ICD-10-CM

## 2024-04-01 RX ORDER — LOSARTAN POTASSIUM 25 MG/1
25 TABLET ORAL DAILY
Qty: 90 TABLET | Refills: 1 | Status: SHIPPED | OUTPATIENT
Start: 2024-04-01

## 2024-04-01 RX ORDER — LISINOPRIL 10 MG/1
10 TABLET ORAL DAILY
Qty: 90 TABLET | Refills: 1 | OUTPATIENT
Start: 2024-04-01

## 2024-04-01 NOTE — TELEPHONE ENCOUNTER
Rx Refill Note  Requested Prescriptions     Pending Prescriptions Disp Refills    lisinopril (PRINIVIL,ZESTRIL) 10 MG tablet [Pharmacy Med Name: LISINOPRIL 10MG TABLETS] 90 tablet 1     Sig: TAKE 1 TABLET BY MOUTH DAILY    losartan (COZAAR) 25 MG tablet [Pharmacy Med Name: LOSARTAN 25MG TABLETS] 90 tablet 1     Sig: TAKE 1 TABLET BY MOUTH DAILY      Last office visit with prescribing clinician: 1/30/2024   Last telemedicine visit with prescribing clinician: Visit date not found   Next office visit with prescribing clinician: 8/2/2024                         Would you like a call back once the refill request has been completed: [] Yes [] No    If the office needs to give you a call back, can they leave a voicemail: [] Yes [] No    Jamee Oliveira MA  04/01/24, 08:58 EDT

## 2024-04-29 ENCOUNTER — OFFICE VISIT (OUTPATIENT)
Dept: CARDIOLOGY | Facility: CLINIC | Age: 59
End: 2024-04-29
Payer: COMMERCIAL

## 2024-04-29 VITALS
DIASTOLIC BLOOD PRESSURE: 80 MMHG | SYSTOLIC BLOOD PRESSURE: 134 MMHG | WEIGHT: 266 LBS | HEIGHT: 70 IN | HEART RATE: 73 BPM | OXYGEN SATURATION: 93 % | BODY MASS INDEX: 38.08 KG/M2

## 2024-04-29 DIAGNOSIS — R94.31 ABNORMAL ELECTROCARDIOGRAM: Primary | ICD-10-CM

## 2024-04-29 DIAGNOSIS — I10 ESSENTIAL HYPERTENSION, BENIGN: ICD-10-CM

## 2024-04-29 DIAGNOSIS — E78.1 HYPERTRIGLYCERIDEMIA: ICD-10-CM

## 2024-04-29 DIAGNOSIS — I25.10 CORONARY ARTERY CALCIFICATION SEEN ON CT SCAN: ICD-10-CM

## 2024-04-29 PROCEDURE — 93000 ELECTROCARDIOGRAM COMPLETE: CPT | Performed by: NURSE PRACTITIONER

## 2024-04-29 PROCEDURE — 99214 OFFICE O/P EST MOD 30 MIN: CPT | Performed by: NURSE PRACTITIONER

## 2024-04-29 RX ORDER — ASPIRIN 81 MG/1
81 TABLET, CHEWABLE ORAL DAILY
COMMUNITY

## 2024-04-29 NOTE — PROGRESS NOTES
Subjective:     Encounter Date:04/29/2024      Patient ID: Rashid Henderson is a 58 y.o. male.    Chief Complaint: Follow-up coronary artery calcification  History of Present Illness  This is a 58-year-old man who follows with Dr. Santana and is new to me today.  He has a past medical history of hypertension, hyperlipidemia, diabetes, tobacco abuse, alcohol use and coronary artery calcification noted on CT scan.    He had been having issues with shortness of breath cough and a CT scan of the chest was ordered by Dr. Omar Cortes.  This was performed on September 14, 2023 and showed no acute cardiopulmonary process.  However he was noted to have moderate coronary artery calcifications and was subsequently referred to cardiology for further evaluation.  He was also referred to pulmonology for interstitial marker rings in the periphery of the upper and lower lobes of the lungs suggesting interstitial lung disease.  Lisinopril was also switched to losartan at that time to see if this would help with this cough. A CT calcium score was obtained that showed a total score of 547.2. He was started on low dose atorvastatin and aspirin.    He is here today for a follow up visit. He has been doing well since last seen. He reports that his cough has been gone for a few months now. He was started on inhalers which seem to have helped. No complaints of chest pain, shortness of breath, palpitations, dizziness or syncope. He denies any significant swelling in his lower extremities, orthopnea or PND. Blood pressure has been well controlled.     I have reviewed and updated as appropriate allergies, current medications, past family history, past medical history, past surgical history and problem list.    Review of Systems   Constitutional: Negative for fever, malaise/fatigue, weight gain and weight loss.   HENT:  Negative for congestion, hoarse voice and sore throat.    Eyes:  Negative for blurred vision and double vision.    Cardiovascular:  Negative for chest pain, dyspnea on exertion, leg swelling, orthopnea, palpitations and syncope.   Respiratory:  Negative for cough, shortness of breath and wheezing.    Gastrointestinal:  Negative for abdominal pain, hematemesis, hematochezia and melena.   Genitourinary:  Negative for dysuria and hematuria.   Neurological:  Negative for dizziness, headaches, light-headedness and numbness.   Psychiatric/Behavioral:  Negative for depression. The patient is not nervous/anxious.          Current Outpatient Medications:     albuterol sulfate  (90 Base) MCG/ACT inhaler, INHALE 2 PUFFS BY MOUTH EVERY 4 HOURS AS NEEDED FOR WHEEZING, Disp: 8.5 g, Rfl: 2    atorvastatin (LIPITOR) 10 MG tablet, Take 1 tablet by mouth Daily. (Patient taking differently: Take 1 tablet by mouth Daily. Hasn't started yet), Disp: 30 tablet, Rfl: 5    empagliflozin (Jardiance) 25 MG tablet tablet, By mouth take one tab daily in AM., Disp: 90 tablet, Rfl: 2    fenofibrate (TRICOR) 145 MG tablet, By mouth take one tab daily., Disp: 90 tablet, Rfl: 2    fluticasone (FLONASE) 50 MCG/ACT nasal spray, 2 sprays into the nostril(s) as directed by provider Daily. (Patient taking differently: 2 sprays into the nostril(s) as directed by provider As Needed.), Disp: 1 bottle, Rfl: 0    Fluticasone Furoate-Vilanterol 100-25 MCG/ACT aerosol powder , Inhale 1 puff Daily., Disp: 60 each, Rfl: 5    glimepiride (Amaryl) 2 MG tablet, By mouth take one tab twice daily with AM and PM meals., Disp: 180 tablet, Rfl: 2    glucose blood (OneTouch Verio) test strip, Check once a day not on insulin tx, poor control, hypoglycemia. Dx: E 11.65, Disp: 100 each, Rfl: 4    Insulin Pen Needle (B-D UF III MINI PEN NEEDLES) 31G X 5 MM misc, For use with GLP-1 pen device to inject based on frequency of medication., Disp: 100 each, Rfl: 4    losartan (COZAAR) 25 MG tablet, TAKE 1 TABLET BY MOUTH DAILY, Disp: 90 tablet, Rfl: 1    metFORMIN ER (GLUCOPHAGE-XR)  500 MG 24 hr tablet, By mouth take 2 tabs twice daily after AM & PM meals., Disp: 360 tablet, Rfl: 2    Omega-3 Fatty Acids (FISH OIL) 1000 MG capsule capsule, Take 3 capsules by mouth Daily., Disp: , Rfl:     omeprazole (priLOSEC) 20 MG capsule, TAKE 2 CAPSULES BY MOUTH DAILY, Disp: 180 capsule, Rfl: 1    Tirzepatide (MOUNJARO) 2.5 MG/0.5ML solution pen-injector pen, Inject 0.5 mL under the skin into the appropriate area as directed 1 (One) Time Per Week., Disp: 0.5 mL, Rfl: 3    valACYclovir (VALTREX) 500 MG tablet, TAKE 1 TABLET BY MOUTH TWICE DAILY FOR 3 DAYS, Disp: 6 tablet, Rfl: 0    Past Medical History:   Diagnosis Date    Abnormal electrocardiogram 01/19/2016    Abnormal LFTs (liver function tests)     suspected fatty liver dz    Allergic     Eczema 01/19/2016    Esophageal stricture     Essential hypertension, benign     GERD (gastroesophageal reflux disease)     Hypertriglyceridemia     Obesity     KATTY (obstructive sleep apnea)     Type 2 diabetes mellitus with hyperglycemia 2010       Past Surgical History:   Procedure Laterality Date    COLONOSCOPY  approx 2019    benign polyps per pt     COLONOSCOPY  08/01/2023    ENDOSCOPY N/A 02/15/2021    Procedure: ESOPHAGOGASTRODUODENOSCOPY WITH REMOVAL OF FOOD BOLUS;  Surgeon: Santo Grant MD;  Location: [facility];  Service: Gastroenterology;  Laterality: N/A;    ENDOSCOPY N/A 03/24/2021    Procedure: ESOPHAGOGASTRODUODENOSCOPY WITH 15-18MM BALLOON DILATATION AND BIOPSIES;  Surgeon: Santo Grant MD;  Location: [facility] ENDOSCOPY;  Service: Gastroenterology;  Laterality: N/A;  PRE- ESOPHAGEAL STRICTURE  POST- HIATAL HERNIA, ESOPHAGEAL STRICTURE, GASTRITIS    ENDOSCOPY N/A 1/26/2024    Procedure: ESOPHAGOGASTRODUODENOSCOPY with biopsies;  Surgeon: Christopher Taylor MD;  Location: [facility] MAIN OR;  Service: Gastroenterology;  Laterality: N/A;  GERD    LASIK      VASECTOMY         Family History   Problem Relation Age of Onset    Other Mother          "Heart    Diabetes Father     Diabetes Brother     Hyperlipidemia Brother     Cancer Maternal Grandmother     Alcohol abuse Maternal Grandfather     Cancer Paternal Grandmother     Diabetes Paternal Grandfather     Diabetes Sister     Diabetes Brother     Malig Hyperthermia Neg Hx        Social History     Tobacco Use    Smoking status: Never     Passive exposure: Never    Smokeless tobacco: Never    Tobacco comments:     Less than 1x per month   Vaping Use    Vaping status: Never Used   Substance Use Topics    Alcohol use: Yes     Comment: SOC    Drug use: No         ECG 12 Lead    Date/Time: 4/29/2024 12:27 PM  Performed by: Radha Castillo APRN    Authorized by: Radha Castillo APRN  Comparison: compared with previous ECG from 11/2/2023  Similar to previous ECG  Rhythm: sinus rhythm  Conduction: 1st degree AV block             Objective:     Visit Vitals  /80 (BP Location: Left arm, Patient Position: Sitting, Cuff Size: Adult)   Pulse 73   Ht 177.8 cm (70\")   Wt 121 kg (266 lb)   SpO2 93%   BMI 38.17 kg/m²             Physical Exam  Constitutional:       Appearance: Normal appearance. He is normal weight.   HENT:      Head: Normocephalic.   Neck:      Vascular: No carotid bruit.   Cardiovascular:      Rate and Rhythm: Normal rate and regular rhythm.      Chest Wall: PMI is not displaced.      Pulses: Normal pulses.           Radial pulses are 2+ on the right side and 2+ on the left side.        Posterior tibial pulses are 2+ on the right side and 2+ on the left side.      Heart sounds: Normal heart sounds. No murmur heard.     No friction rub. No gallop.   Pulmonary:      Effort: Pulmonary effort is normal.      Breath sounds: Normal breath sounds.   Abdominal:      General: Bowel sounds are normal. There is no distension.      Palpations: Abdomen is soft.   Musculoskeletal:      Right lower leg: No edema.      Left lower leg: No edema.   Skin:     General: Skin is warm and dry.      Capillary Refill: " Capillary refill takes less than 2 seconds.   Neurological:      Mental Status: He is alert and oriented to person, place, and time.   Psychiatric:         Mood and Affect: Mood normal.         Behavior: Behavior normal.         Thought Content: Thought content normal.          Lab Review:   Lipid Panel          7/13/2023    08:50 1/23/2024    08:21   Lipid Panel   Total Cholesterol 165  185    Triglycerides 92  121    HDL Cholesterol 65  73    VLDL Cholesterol 17  21    LDL Cholesterol  83  91    LDL/HDL Ratio 1.26  1.20          Cardiac Procedures:       Assessment:         Diagnoses and all orders for this visit:    1. Abnormal electrocardiogram (Primary)    2. Coronary artery calcification seen on CT scan    3. Essential hypertension, benign    4. Hypertriglyceridemia    Other orders  -     ECG 12 Lead            Plan:       Coronary artery calcification: noted on CT scan. CT calcium score of 547.2. Now on aspirin and low dose statin. EKG is stable. No anginal symptoms. No changes at this time.  HTN: blood pressure is well controlled. No changes.  HLD: on low dose statin. Recent CMP showed stable LFTs. Repeat lipid panel in a few months.     Thank you for allowing me to participate in this patient's care. Please call with any questions or concerns. Mr Henderson will follow up with Dr. Santana in 1 year          Your medication list            Accurate as of April 29, 2024 12:28 PM. If you have any questions, ask your nurse or doctor.                CHANGE how you take these medications        Instructions Last Dose Given Next Dose Due   atorvastatin 10 MG tablet  Commonly known as: LIPITOR  What changed: additional instructions      Take 1 tablet by mouth Daily.       fluticasone 50 MCG/ACT nasal spray  Commonly known as: FLONASE  What changed:   when to take this  reasons to take this      2 sprays into the nostril(s) as directed by provider Daily.              CONTINUE taking these medications        Instructions  Last Dose Given Next Dose Due   albuterol sulfate  (90 Base) MCG/ACT inhaler  Commonly known as: PROVENTIL HFA;VENTOLIN HFA;PROAIR HFA      INHALE 2 PUFFS BY MOUTH EVERY 4 HOURS AS NEEDED FOR WHEEZING       B-D UF III MINI PEN NEEDLES 31G X 5 MM misc  Generic drug: Insulin Pen Needle      For use with GLP-1 pen device to inject based on frequency of medication.       empagliflozin 25 MG tablet tablet  Commonly known as: Jardiance      By mouth take one tab daily in AM.       fenofibrate 145 MG tablet  Commonly known as: TRICOR      By mouth take one tab daily.       fish oil 1000 MG capsule capsule      Take 3 capsules by mouth Daily.       Fluticasone Furoate-Vilanterol 100-25 MCG/ACT aerosol powder       Inhale 1 puff Daily.       glimepiride 2 MG tablet  Commonly known as: Amaryl      By mouth take one tab twice daily with AM and PM meals.       losartan 25 MG tablet  Commonly known as: COZAAR      TAKE 1 TABLET BY MOUTH DAILY       metFORMIN  MG 24 hr tablet  Commonly known as: GLUCOPHAGE-XR      By mouth take 2 tabs twice daily after AM & PM meals.       omeprazole 20 MG capsule  Commonly known as: priLOSEC      TAKE 2 CAPSULES BY MOUTH DAILY       OneTouch Verio test strip  Generic drug: glucose blood      Check once a day not on insulin tx, poor control, hypoglycemia. Dx: E 11.65       Tirzepatide 2.5 MG/0.5ML solution pen-injector pen  Commonly known as: MOUNJARO      Inject 0.5 mL under the skin into the appropriate area as directed 1 (One) Time Per Week.       valACYclovir 500 MG tablet  Commonly known as: VALTREX      TAKE 1 TABLET BY MOUTH TWICE DAILY FOR 3 DAYS                  Radha Castillo, KERRI  04/29/24  10:10 AM EDT

## 2024-05-14 ENCOUNTER — TELEPHONE (OUTPATIENT)
Dept: ENDOCRINOLOGY | Age: 59
End: 2024-05-14
Payer: COMMERCIAL

## 2024-05-31 DIAGNOSIS — E11.65 TYPE 2 DIABETES MELLITUS WITH HYPERGLYCEMIA, UNSPECIFIED WHETHER LONG TERM INSULIN USE: ICD-10-CM

## 2024-06-01 DIAGNOSIS — R05.9 COUGH: ICD-10-CM

## 2024-06-01 DIAGNOSIS — R09.81 NASAL CONGESTION: ICD-10-CM

## 2024-06-01 RX ORDER — GLIMEPIRIDE 2 MG/1
TABLET ORAL
Qty: 180 TABLET | Refills: 2 | Status: SHIPPED | OUTPATIENT
Start: 2024-06-01

## 2024-06-03 NOTE — TELEPHONE ENCOUNTER
Rx Refill Note  Requested Prescriptions     Pending Prescriptions Disp Refills    Mounjaro 2.5 MG/0.5ML solution pen-injector pen [Pharmacy Med Name: MOUNJARO 2.5MG/0.5ML INJ ( 4 PENS)] 2 mL      Sig: INJECT 0.5ML(2.5MG) UNDER THE SKIN ONCE A WEEK    albuterol sulfate  (90 Base) MCG/ACT inhaler [Pharmacy Med Name: ALBUTEROL HFA INH (200 PUFFS) 8.5GM] 8.5 g 2     Sig: INHALE 2 PUFFS BY MOUTH EVERY 4 HOURS AS NEEDED FOR WHEEZING      Last office visit with prescribing clinician: 1/30/2024   Last telemedicine visit with prescribing clinician: Visit date not found   Next office visit with prescribing clinician: 8/2/2024                         Would you like a call back once the refill request has been completed: [] Yes [] No    If the office needs to give you a call back, can they leave a voicemail: [] Yes [] No    José Luis Miguel MA  06/03/24, 08:17 EDT

## 2024-06-04 RX ORDER — OMEPRAZOLE 20 MG/1
40 CAPSULE, DELAYED RELEASE ORAL DAILY
Qty: 180 CAPSULE | Refills: 1 | Status: SHIPPED | OUTPATIENT
Start: 2024-06-04

## 2024-06-04 RX ORDER — ALBUTEROL SULFATE 90 UG/1
2 AEROSOL, METERED RESPIRATORY (INHALATION) EVERY 4 HOURS PRN
Qty: 8.5 G | Refills: 2 | Status: SHIPPED | OUTPATIENT
Start: 2024-06-04

## 2024-06-04 RX ORDER — TIRZEPATIDE 2.5 MG/.5ML
INJECTION, SOLUTION SUBCUTANEOUS
Qty: 2 ML | Refills: 2 | Status: SHIPPED | OUTPATIENT
Start: 2024-06-04

## 2024-07-24 DIAGNOSIS — I10 ESSENTIAL HYPERTENSION, BENIGN: ICD-10-CM

## 2024-07-24 DIAGNOSIS — E11.65 TYPE 2 DIABETES MELLITUS WITH HYPERGLYCEMIA, WITHOUT LONG-TERM CURRENT USE OF INSULIN: ICD-10-CM

## 2024-07-25 LAB
ALBUMIN SERPL-MCNC: 4.6 G/DL (ref 3.5–5.2)
ALBUMIN/GLOB SERPL: 1.9 G/DL
ALP SERPL-CCNC: 42 U/L (ref 39–117)
ALT SERPL-CCNC: 33 U/L (ref 1–41)
AST SERPL-CCNC: 26 U/L (ref 1–40)
BILIRUB SERPL-MCNC: 0.3 MG/DL (ref 0–1.2)
BUN SERPL-MCNC: 18 MG/DL (ref 6–20)
BUN/CREAT SERPL: 15.3 (ref 7–25)
CALCIUM SERPL-MCNC: 9.7 MG/DL (ref 8.6–10.5)
CHLORIDE SERPL-SCNC: 102 MMOL/L (ref 98–107)
CHOLEST SERPL-MCNC: 144 MG/DL (ref 0–200)
CO2 SERPL-SCNC: 27.5 MMOL/L (ref 22–29)
CREAT SERPL-MCNC: 1.18 MG/DL (ref 0.76–1.27)
EGFRCR SERPLBLD CKD-EPI 2021: 72.4 ML/MIN/1.73
GLOBULIN SER CALC-MCNC: 2.4 GM/DL
GLUCOSE SERPL-MCNC: 85 MG/DL (ref 65–99)
HBA1C MFR BLD: 6.5 % (ref 4.8–5.6)
HDLC SERPL-MCNC: 79 MG/DL (ref 40–60)
LDLC SERPL CALC-MCNC: 50 MG/DL (ref 0–100)
LDLC/HDLC SERPL: 0.63 {RATIO}
POTASSIUM SERPL-SCNC: 4.8 MMOL/L (ref 3.5–5.2)
PROT SERPL-MCNC: 7 G/DL (ref 6–8.5)
SODIUM SERPL-SCNC: 140 MMOL/L (ref 136–145)
T4 FREE SERPL-MCNC: 1.51 NG/DL (ref 0.92–1.68)
TRIGL SERPL-MCNC: 78 MG/DL (ref 0–150)
TSH SERPL DL<=0.005 MIU/L-ACNC: 3.57 UIU/ML (ref 0.27–4.2)
VLDLC SERPL CALC-MCNC: 15 MG/DL (ref 5–40)

## 2024-07-28 DIAGNOSIS — R09.81 NASAL CONGESTION: ICD-10-CM

## 2024-07-28 DIAGNOSIS — R05.9 COUGH: ICD-10-CM

## 2024-07-29 RX ORDER — ALBUTEROL SULFATE 90 UG/1
2 AEROSOL, METERED RESPIRATORY (INHALATION) EVERY 4 HOURS PRN
Qty: 6.7 G | Refills: 0 | Status: SHIPPED | OUTPATIENT
Start: 2024-07-29

## 2024-07-29 NOTE — TELEPHONE ENCOUNTER
Rx Refill Note  Requested Prescriptions     Pending Prescriptions Disp Refills    albuterol sulfate  (90 Base) MCG/ACT inhaler [Pharmacy Med Name: ALBUTEROL HFA INH (200 PUFFS) 6.7GM] 6.7 g      Sig: INHALE 2 PUFFS BY MOUTH EVERY 4 HOURS AS NEEDED FOR WHEEZING      Last office visit with prescribing clinician: 1/30/2024   Last telemedicine visit with prescribing clinician: Visit date not found   Next office visit with prescribing clinician: 8/2/2024                         Would you like a call back once the refill request has been completed: [] Yes [] No    If the office needs to give you a call back, can they leave a voicemail: [] Yes [] No    José Luis Miguel MA  07/29/24, 08:10 EDT

## 2024-07-30 RX ORDER — ATORVASTATIN CALCIUM 10 MG/1
10 TABLET, FILM COATED ORAL DAILY
Qty: 90 TABLET | Refills: 1 | Status: SHIPPED | OUTPATIENT
Start: 2024-07-30

## 2024-08-02 ENCOUNTER — OFFICE VISIT (OUTPATIENT)
Dept: FAMILY MEDICINE CLINIC | Facility: CLINIC | Age: 59
End: 2024-08-02
Payer: COMMERCIAL

## 2024-08-02 VITALS
DIASTOLIC BLOOD PRESSURE: 82 MMHG | SYSTOLIC BLOOD PRESSURE: 110 MMHG | WEIGHT: 259 LBS | OXYGEN SATURATION: 95 % | HEART RATE: 75 BPM | BODY MASS INDEX: 37.08 KG/M2 | TEMPERATURE: 98.5 F | RESPIRATION RATE: 17 BRPM | HEIGHT: 70 IN

## 2024-08-02 DIAGNOSIS — E78.1 HYPERTRIGLYCERIDEMIA: ICD-10-CM

## 2024-08-02 DIAGNOSIS — I10 ESSENTIAL HYPERTENSION: ICD-10-CM

## 2024-08-02 DIAGNOSIS — E78.00 HYPERCHOLESTEROLEMIA: Primary | ICD-10-CM

## 2024-08-02 DIAGNOSIS — E11.65 TYPE 2 DIABETES MELLITUS WITH HYPERGLYCEMIA, UNSPECIFIED WHETHER LONG TERM INSULIN USE: ICD-10-CM

## 2024-08-02 PROCEDURE — 99214 OFFICE O/P EST MOD 30 MIN: CPT | Performed by: INTERNAL MEDICINE

## 2024-08-02 RX ORDER — FENOFIBRATE 145 MG/1
TABLET, COATED ORAL
Qty: 90 TABLET | Refills: 2 | Status: SHIPPED | OUTPATIENT
Start: 2024-08-02

## 2024-08-02 RX ORDER — LOSARTAN POTASSIUM 25 MG/1
25 TABLET ORAL DAILY
Qty: 90 TABLET | Refills: 1 | Status: SHIPPED | OUTPATIENT
Start: 2024-08-02

## 2024-08-02 RX ORDER — METFORMIN HYDROCHLORIDE 500 MG/1
TABLET, EXTENDED RELEASE ORAL
Qty: 360 TABLET | Refills: 2 | Status: SHIPPED | OUTPATIENT
Start: 2024-08-02

## 2024-08-02 NOTE — PROGRESS NOTES
"Chief Complaint  Hypertension (6 mon f/u)    Subjective        Rashid Henderson presents to [facility]  History of Present Illness  Here for 6 mo f/u on HTN, NIDDM and HL.  He is doing well.  No concerns.  FBS running around 120.  He is walking for exercise.  He is tolerating mounjaro 2.5 mg weekly w/o side effects.  Has lost 11 pounds since January 2024.  He takes Jardiance 25 mg daily, metformin 1 g twice daily glimepiride 2 mg twice daily and Mounjaro 2.5 mg weekly for diabetes.  He takes losartan 25 mg daily for hypertension which is well-controlled.  He takes Lipitor for cholesterol and tricor for HTG.  He is on omeprazole 20 mg qd for GERD.     Objective   Vital Signs:  /82   Pulse 75   Temp 98.5 °F (36.9 °C)   Resp 17   Ht 177.8 cm (70\")   Wt 117 kg (259 lb)   SpO2 95%   BMI 37.16 kg/m²   Estimated body mass index is 37.16 kg/m² as calculated from the following:    Height as of this encounter: 177.8 cm (70\").    Weight as of this encounter: 117 kg (259 lb).               Physical Exam  Vitals and nursing note reviewed.   Constitutional:       Appearance: Normal appearance. He is well-developed.   HENT:      Head: Normocephalic and atraumatic.      Right Ear: External ear normal.      Left Ear: External ear normal.   Eyes:      Extraocular Movements: Extraocular movements intact.      Conjunctiva/sclera: Conjunctivae normal.   Neck:      Vascular: No carotid bruit.   Cardiovascular:      Rate and Rhythm: Normal rate and regular rhythm.      Heart sounds: Normal heart sounds.      Comments: No bruits  Pulmonary:      Effort: Pulmonary effort is normal. No respiratory distress.      Breath sounds: Normal breath sounds. No stridor. No wheezing, rhonchi or rales.   Chest:      Chest wall: No tenderness.   Abdominal:      General: Bowel sounds are normal. There is no distension.      Palpations: Abdomen is soft. There is no mass.      Tenderness: There is no abdominal " tenderness. There is no guarding or rebound.      Hernia: No hernia is present.   Musculoskeletal:      Cervical back: Neck supple.      Right lower leg: No edema.      Left lower leg: No edema.   Lymphadenopathy:      Cervical: No cervical adenopathy.   Skin:     General: Skin is warm.   Neurological:      General: No focal deficit present.      Mental Status: He is alert and oriented to person, place, and time. Mental status is at baseline.   Psychiatric:         Mood and Affect: Mood normal.         Behavior: Behavior normal.         Thought Content: Thought content normal.         Judgment: Judgment normal.     Answers submitted by the patient for this visit:  Primary Reason for Visit (Submitted on 7/26/2024)  What is the primary reason for your visit?: Diabetes  Diabetes Questionnaire (Submitted on 7/26/2024)  Chief Complaint: Diabetes problem  Below 70: never     Result Review :                     Assessment and Plan     Diagnoses and all orders for this visit:    1. Hypercholesterolemia (Primary)    2. Type 2 diabetes mellitus with hyperglycemia, unspecified whether long term insulin use  -     metFORMIN ER (GLUCOPHAGE-XR) 500 MG 24 hr tablet; By mouth take 2 tabs twice daily after AM & PM meals.  Dispense: 360 tablet; Refill: 2  -     empagliflozin (Jardiance) 25 MG tablet tablet; By mouth take one tab daily in AM.  Dispense: 90 tablet; Refill: 2  -     Comprehensive Metabolic Panel; Future  -     Hemoglobin A1c; Future  -     Lipid Panel With LDL / HDL Ratio; Future  -     TSH; Future  -     T4, Free; Future    3. Essential hypertension  -     losartan (COZAAR) 25 MG tablet; Take 1 tablet by mouth Daily.  Dispense: 90 tablet; Refill: 1  -     Comprehensive Metabolic Panel; Future  -     Hemoglobin A1c; Future  -     Lipid Panel With LDL / HDL Ratio; Future  -     TSH; Future  -     T4, Free; Future    4. Hypertriglyceridemia  -     fenofibrate (TRICOR) 145 MG tablet; By mouth take one tab daily.  Dispense:  90 tablet; Refill: 2  -     Comprehensive Metabolic Panel; Future  -     Hemoglobin A1c; Future  -     Lipid Panel With LDL / HDL Ratio; Future  -     TSH; Future  -     T4, Free; Future    Other orders  -     Tirzepatide (MOUNJARO) 5 MG/0.5ML solution pen-injector pen; Inject 0.5 mL under the skin into the appropriate area as directed 1 (One) Time Per Week.  Dispense: 3 mL; Refill: 4      Ideally I would like to get him off glimepiride altogether.  I am going to increase his Mounjaro to 5 mg weekly and he will decrease his glimepiride to 2 mg in the morning.  He will monitor his fasting blood sugars and if they remain normal level for him, less than 120, he will stop the glimepiride altogether.  Continue Jardiance 25 mg daily.  Continue losartan for hypertension which is well-controlled.  Continued weight loss would be beneficial for him.  He will continue the Tricor for hypertriglyceridemia and the Lipitor for hyperlipidemia.  Eye exam is up-to-date.  Return to clinic 6 months       Follow Up     No follow-ups on file.  Patient was given instructions and counseling regarding his condition or for health maintenance advice. Please see specific information pulled into the AVS if appropriate.

## 2024-08-03 LAB — MICROALBUMIN UR-MCNC: 3.5 UG/ML

## 2024-08-13 DIAGNOSIS — R09.81 NASAL CONGESTION: ICD-10-CM

## 2024-08-13 DIAGNOSIS — R05.9 COUGH: ICD-10-CM

## 2024-08-14 RX ORDER — ALBUTEROL SULFATE 90 UG/1
2 AEROSOL, METERED RESPIRATORY (INHALATION) EVERY 4 HOURS PRN
Qty: 6.7 G | Refills: 0 | Status: SHIPPED | OUTPATIENT
Start: 2024-08-14

## 2024-08-14 NOTE — TELEPHONE ENCOUNTER
Rx Refill Note  Requested Prescriptions     Pending Prescriptions Disp Refills    albuterol sulfate  (90 Base) MCG/ACT inhaler [Pharmacy Med Name: ALBUTEROL HFA INH (200 PUFFS) 6.7GM] 6.7 g 0     Sig: INHALE 2 PUFFS BY MOUTH EVERY 4 HOURS AS NEEDED FOR WHEEZING      Last office visit with prescribing clinician: 8/2/2024   Last telemedicine visit with prescribing clinician: Visit date not found   Next office visit with prescribing clinician: 2/4/2025                         Would you like a call back once the refill request has been completed: [] Yes [] No    If the office needs to give you a call back, can they leave a voicemail: [] Yes [] No    Herbert Millan Rep  08/14/24, 14:43 EDT

## 2024-08-22 RX ORDER — VALACYCLOVIR HYDROCHLORIDE 500 MG/1
TABLET, FILM COATED ORAL
Qty: 6 TABLET | Refills: 0 | Status: SHIPPED | OUTPATIENT
Start: 2024-08-22

## 2024-09-05 DIAGNOSIS — R09.81 NASAL CONGESTION: ICD-10-CM

## 2024-09-05 DIAGNOSIS — R05.9 COUGH: ICD-10-CM

## 2024-09-10 RX ORDER — ALBUTEROL SULFATE 90 UG/1
2 AEROSOL, METERED RESPIRATORY (INHALATION) EVERY 4 HOURS PRN
Qty: 6.7 G | Refills: 0 | Status: SHIPPED | OUTPATIENT
Start: 2024-09-10

## 2024-11-14 RX ORDER — VALACYCLOVIR HYDROCHLORIDE 500 MG/1
TABLET, FILM COATED ORAL
Qty: 6 TABLET | Refills: 0 | Status: SHIPPED | OUTPATIENT
Start: 2024-11-14

## 2024-12-26 DIAGNOSIS — I10 ESSENTIAL HYPERTENSION: ICD-10-CM

## 2024-12-26 RX ORDER — LOSARTAN POTASSIUM 25 MG/1
25 TABLET ORAL DAILY
Qty: 90 TABLET | Refills: 1 | Status: SHIPPED | OUTPATIENT
Start: 2024-12-26

## 2025-01-22 RX ORDER — TIRZEPATIDE 5 MG/.5ML
INJECTION, SOLUTION SUBCUTANEOUS
Qty: 2 ML | Refills: 4 | Status: SHIPPED | OUTPATIENT
Start: 2025-01-22

## 2025-01-22 NOTE — TELEPHONE ENCOUNTER
Rx Refill Note  Requested Prescriptions     Pending Prescriptions Disp Refills    Mounjaro 5 MG/0.5ML solution auto-injector [Pharmacy Med Name: MOUNJARO 5MG/0.5ML INJ (4 PENS)] 2 mL      Sig: INJECT 5MG INTO SKIN WEEKLY AS DIRECTED      Last office visit with prescribing clinician: 8/2/2024   Last telemedicine visit with prescribing clinician: Visit date not found   Next office visit with prescribing clinician: 2/4/2025                         Would you like a call back once the refill request has been completed: [] Yes [] No    If the office needs to give you a call back, can they leave a voicemail: [] Yes [] No    Cipriano Srivastava MA  01/22/25, 08:26 EST

## 2025-01-23 RX ORDER — ATORVASTATIN CALCIUM 10 MG/1
10 TABLET, FILM COATED ORAL DAILY
Qty: 90 TABLET | Refills: 1 | Status: SHIPPED | OUTPATIENT
Start: 2025-01-23

## 2025-01-24 DIAGNOSIS — E78.1 HYPERTRIGLYCERIDEMIA: ICD-10-CM

## 2025-01-24 DIAGNOSIS — I10 ESSENTIAL HYPERTENSION: ICD-10-CM

## 2025-01-24 DIAGNOSIS — E11.65 TYPE 2 DIABETES MELLITUS WITH HYPERGLYCEMIA, UNSPECIFIED WHETHER LONG TERM INSULIN USE: ICD-10-CM

## 2025-01-28 LAB
ALBUMIN SERPL-MCNC: 4.2 G/DL (ref 3.5–5.2)
ALBUMIN/GLOB SERPL: 1.6 G/DL
ALP SERPL-CCNC: 52 U/L (ref 39–117)
ALT SERPL-CCNC: 34 U/L (ref 1–41)
AST SERPL-CCNC: 23 U/L (ref 1–40)
BILIRUB SERPL-MCNC: 0.3 MG/DL (ref 0–1.2)
BUN SERPL-MCNC: 25 MG/DL (ref 6–20)
BUN/CREAT SERPL: 21.9 (ref 7–25)
CALCIUM SERPL-MCNC: 9.5 MG/DL (ref 8.6–10.5)
CHLORIDE SERPL-SCNC: 102 MMOL/L (ref 98–107)
CHOLEST SERPL-MCNC: 132 MG/DL (ref 0–200)
CO2 SERPL-SCNC: 25.6 MMOL/L (ref 22–29)
CREAT SERPL-MCNC: 1.14 MG/DL (ref 0.76–1.27)
EGFRCR SERPLBLD CKD-EPI 2021: 75 ML/MIN/1.73
GLOBULIN SER CALC-MCNC: 2.6 GM/DL
GLUCOSE SERPL-MCNC: 130 MG/DL (ref 65–99)
HBA1C MFR BLD: 6.4 % (ref 4.8–5.6)
HDLC SERPL-MCNC: 70 MG/DL (ref 40–60)
LDLC SERPL CALC-MCNC: 48 MG/DL (ref 0–100)
LDLC/HDLC SERPL: 0.68 {RATIO}
POTASSIUM SERPL-SCNC: 4.5 MMOL/L (ref 3.5–5.2)
PROT SERPL-MCNC: 6.8 G/DL (ref 6–8.5)
SODIUM SERPL-SCNC: 140 MMOL/L (ref 136–145)
T4 FREE SERPL-MCNC: 1.43 NG/DL (ref 0.92–1.68)
TRIGL SERPL-MCNC: 71 MG/DL (ref 0–150)
TSH SERPL DL<=0.005 MIU/L-ACNC: 3.31 UIU/ML (ref 0.27–4.2)
VLDLC SERPL CALC-MCNC: 14 MG/DL (ref 5–40)

## 2025-02-04 ENCOUNTER — OFFICE VISIT (OUTPATIENT)
Dept: FAMILY MEDICINE CLINIC | Facility: CLINIC | Age: 60
End: 2025-02-04
Payer: COMMERCIAL

## 2025-02-04 VITALS
WEIGHT: 260.7 LBS | HEIGHT: 70 IN | HEART RATE: 77 BPM | DIASTOLIC BLOOD PRESSURE: 82 MMHG | BODY MASS INDEX: 37.32 KG/M2 | OXYGEN SATURATION: 98 % | SYSTOLIC BLOOD PRESSURE: 124 MMHG

## 2025-02-04 DIAGNOSIS — E11.65 TYPE 2 DIABETES MELLITUS WITH HYPERGLYCEMIA, UNSPECIFIED WHETHER LONG TERM INSULIN USE: Primary | ICD-10-CM

## 2025-02-04 DIAGNOSIS — E78.1 HYPERTRIGLYCERIDEMIA: ICD-10-CM

## 2025-02-04 DIAGNOSIS — I10 ESSENTIAL HYPERTENSION: ICD-10-CM

## 2025-02-04 DIAGNOSIS — E78.00 HYPERCHOLESTEROLEMIA: ICD-10-CM

## 2025-02-04 PROCEDURE — 99396 PREV VISIT EST AGE 40-64: CPT | Performed by: INTERNAL MEDICINE

## 2025-02-04 RX ORDER — TIRZEPATIDE 5 MG/.5ML
5 INJECTION, SOLUTION SUBCUTANEOUS WEEKLY
Qty: 2 ML | Refills: 4 | Status: SHIPPED | OUTPATIENT
Start: 2025-02-04

## 2025-02-04 RX ORDER — VALACYCLOVIR HYDROCHLORIDE 500 MG/1
500 TABLET, FILM COATED ORAL 2 TIMES DAILY
Qty: 6 TABLET | Refills: 1 | Status: SHIPPED | OUTPATIENT
Start: 2025-02-04

## 2025-02-04 NOTE — PROGRESS NOTES
"Chief Complaint  Annual Exam    Subjective        Rashid Henderson presents to [facility]  History of Present Illness  Here for f/u on NIDDM, HTN, and HL and HTG.  FBS running 115-155 depending on diet ; not exercising much but wants to start;  wife recently dx with NIDDM and thinks their diet will change soon.  2023 CN and repeat in 7 years with Dr. Silver.  Dr. Martinez, his urologist, cleared him for '5 years' and asked me to check PSA yearly starting in 11/2025.  He also gets PSA screened at his work yearly at [facility].  Eye exam 2024.   Has had hard time getting mounjaro at  due to supply issues.  Wants me to send mounjaro to [facility].    No complaints today.  He is very pleased with his labs and numbers.  Needs refill on valtrex that he takes prn for cold sores.      Objective   Vital Signs:  /82 (BP Location: Right arm, Patient Position: Sitting, Cuff Size: Large Adult)   Pulse 77   Ht 177.8 cm (70\")   Wt 118 kg (260 lb 11.2 oz)   SpO2 98%   BMI 37.41 kg/m²   Estimated body mass index is 37.41 kg/m² as calculated from the following:    Height as of this encounter: 177.8 cm (70\").    Weight as of this encounter: 118 kg (260 lb 11.2 oz).    Class 2 Severe Obesity (BMI >=35 and <=39.9). Obesity-related health conditions include the following: hypertension, diabetes mellitus, and dyslipidemias. Obesity is improving with treatment. BMI is is above average; BMI management plan is completed. We discussed low calorie, low carb based diet program, portion control, increasing exercise, and pharmacologic options including mounjaro .    T4, Free (01/28/2025 08:15)  TSH (01/28/2025 08:15)  Lipid Panel With LDL / HDL Ratio (01/28/2025 08:15)  Hemoglobin A1c (01/28/2025 08:15)  Comprehensive Metabolic Panel (01/28/2025 08:15)  Physical Exam  Vitals and nursing note reviewed.   Constitutional:       Appearance: Normal appearance. He is well-developed.   HENT:      Head: Normocephalic and " atraumatic.      Right Ear: External ear normal.      Left Ear: External ear normal.   Eyes:      Extraocular Movements: Extraocular movements intact.      Conjunctiva/sclera: Conjunctivae normal.   Neck:      Vascular: No carotid bruit.   Cardiovascular:      Rate and Rhythm: Normal rate and regular rhythm.      Heart sounds: Normal heart sounds.      Comments: No bruits  Pulmonary:      Effort: Pulmonary effort is normal. No respiratory distress.      Breath sounds: Normal breath sounds. No stridor. No wheezing, rhonchi or rales.   Chest:      Chest wall: No tenderness.   Abdominal:      General: Bowel sounds are normal. There is no distension.      Palpations: Abdomen is soft. There is no mass.      Tenderness: There is no abdominal tenderness. There is no guarding or rebound.      Hernia: No hernia is present.   Musculoskeletal:      Cervical back: Neck supple.      Right lower leg: No edema.      Left lower leg: No edema.   Lymphadenopathy:      Cervical: No cervical adenopathy.   Skin:     General: Skin is warm.   Neurological:      General: No focal deficit present.      Mental Status: He is alert and oriented to person, place, and time. Mental status is at baseline.   Psychiatric:         Mood and Affect: Mood normal.         Behavior: Behavior normal.         Thought Content: Thought content normal.         Judgment: Judgment normal.        Result Review :                Assessment and Plan   Diagnoses and all orders for this visit:    1. Type 2 diabetes mellitus with hyperglycemia, unspecified whether long term insulin use (Primary)  -     Hemoglobin A1c; Future  -     T4, Free; Future  -     TSH; Future  -     Microalbumin / Creatinine Urine Ratio - Urine, Clean Catch; Future  -     Lipid Panel With LDL / HDL Ratio; Future  -     CK; Future  -     Comprehensive Metabolic Panel; Future  -     CBC & Differential; Future  -     Lipase; Future    2. Essential hypertension  -     Hemoglobin A1c; Future  -      T4, Free; Future  -     TSH; Future  -     Microalbumin / Creatinine Urine Ratio - Urine, Clean Catch; Future  -     Lipid Panel With LDL / HDL Ratio; Future  -     CK; Future  -     Comprehensive Metabolic Panel; Future  -     CBC & Differential; Future  -     Lipase; Future    3. Hypertriglyceridemia  -     Hemoglobin A1c; Future  -     T4, Free; Future  -     TSH; Future  -     Microalbumin / Creatinine Urine Ratio - Urine, Clean Catch; Future  -     Lipid Panel With LDL / HDL Ratio; Future  -     CK; Future  -     Comprehensive Metabolic Panel; Future  -     CBC & Differential; Future  -     Lipase; Future    4. Hypercholesterolemia  -     Hemoglobin A1c; Future  -     T4, Free; Future  -     TSH; Future  -     Microalbumin / Creatinine Urine Ratio - Urine, Clean Catch; Future  -     Lipid Panel With LDL / HDL Ratio; Future  -     CK; Future  -     Comprehensive Metabolic Panel; Future  -     CBC & Differential; Future  -     Lipase; Future    Other orders  -     Tirzepatide (Mounjaro) 5 MG/0.5ML solution auto-injector; Inject 5 mg under the skin into the appropriate area as directed 1 (One) Time Per Week.  Dispense: 2 mL; Refill: 4  -     valACYclovir (VALTREX) 500 MG tablet; Take 1 tablet by mouth 2 (Two) Times a Day. for 3 days  Dispense: 6 tablet; Refill: 1    Fasting labs reviewed and look great.  His A1c has consistently been under 7% and most recently at 6.4%.  Sent Mounjaro to [place] pharmacy due to supply issues he will continue Jardiance as well.  Encourage patient to exercise and eat a low-carb low sugar diet.  Continued efforts towards losing weight encouraged.  Eye exam up-to-date.  Foot exam completed today.  Continue fenofibrate and Lipitor for hypertriglyceridemia and hyperlipidemia.  Lipids look normal.  Will check CK and lipase at next office visit.  Colonoscopy is due in 2030, will check PSA after November 2024 at his next physical.  We discussed either he him coming in for a PSA after  November 19 or waiting until his physical which would be early February 2026.  Patient referred to come in in February 2026 for his screening PSA.  Preventative counseling provided.         Follow Up   Return in about 6 months (around 8/4/2025) for Recheck.  Patient was given instructions and counseling regarding his condition or for health maintenance advice. Please see specific information pulled into the AVS if appropriate.

## 2025-03-13 DIAGNOSIS — I10 ESSENTIAL HYPERTENSION: ICD-10-CM

## 2025-03-13 DIAGNOSIS — E11.65 TYPE 2 DIABETES MELLITUS WITH HYPERGLYCEMIA, UNSPECIFIED WHETHER LONG TERM INSULIN USE: ICD-10-CM

## 2025-03-13 RX ORDER — LOSARTAN POTASSIUM 25 MG/1
25 TABLET ORAL DAILY
Qty: 90 TABLET | Refills: 1 | Status: SHIPPED | OUTPATIENT
Start: 2025-03-13

## 2025-03-13 RX ORDER — METFORMIN HYDROCHLORIDE 500 MG/1
TABLET, EXTENDED RELEASE ORAL
Qty: 360 TABLET | Refills: 1 | Status: SHIPPED | OUTPATIENT
Start: 2025-03-13

## 2025-03-13 NOTE — TELEPHONE ENCOUNTER
Rx Refill Note  Requested Prescriptions     Pending Prescriptions Disp Refills    metFORMIN ER (GLUCOPHAGE-XR) 500 MG 24 hr tablet 360 tablet 2     Sig: By mouth take 2 tabs twice daily after AM & PM meals.    losartan (COZAAR) 25 MG tablet 90 tablet 1     Sig: Take 1 tablet by mouth Daily.      Last office visit with prescribing clinician: 2/4/2025   Last telemedicine visit with prescribing clinician: Visit date not found   Next office visit with prescribing clinician: 8/5/2025                         Would you like a call back once the refill request has been completed: [] Yes [] No    If the office needs to give you a call back, can they leave a voicemail: [] Yes [] No    Valerie Durbin MA  03/13/25, 08:21 EDT

## 2025-04-17 ENCOUNTER — TELEPHONE (OUTPATIENT)
Dept: FAMILY MEDICINE CLINIC | Facility: CLINIC | Age: 60
End: 2025-04-17
Payer: COMMERCIAL

## 2025-04-22 NOTE — TELEPHONE ENCOUNTER
Called and spoke to pt he agreed to increase dose of mounjaro to 7.5 mg and stop taking glimepiride   [facility]  
Called pt and left VM regarding med request received for glimepiride-shows in chart discontinued she wanted to confirm w/ pt  
Pt called back and stated that he is still currently taking the medication. Pt states he takes once a day. Please advise.   Pt states can leave a detail voice message or can send a message through [facility] if he cannot be reached by the phone.   
Interval HPI / Overnight events:   Female Single liveborn, born in hospital, delivered by  delivery   born at 40.5 weeks gestation, now 2d old.  No acute events overnight.     Feeding / voiding/ stooling appropriately    Physical Exam:   Current Weight: Daily     Daily Weight Gm: 3607 (11 Mar 2018 00:00)  Percent Change From Birth: -5.4%    Vitals stable    Physical exam unchanged from prior exam, except as noted:   no jaundice  no murmur     Laboratory & Imaging Studies:     Total Bilirubin: 9.0 mg/dL  Direct Bilirubin: --    If applicable, Bili performed at 35 hours of life.   Risk zone: high intermediate (photo threshold 13.4)      Assessment and Plan of Care:     [x ] Normal / Healthy [place]  [ ] GBS Protocol  [ ] Hypoglycemia Protocol for SGA / LGA / IDM / Premature Infant  [ ] Other:     Family Discussion:   [x ]Feeding and baby weight loss were discussed today. Parent questions were answered  [x ]Other items discussed: repeat bili tonight  [ ]Unable to speak with family today due to maternal condition

## 2025-05-05 NOTE — PROGRESS NOTES
Subjective:     Encounter Date:05/06/2025      Patient ID: Rashid Henderson is a 59 y.o. male.    Chief Complaint:  History of Present Illness    This is a 59-year-old with hypertension, hyperlipidemia, diabetes mellitus type 2, tobacco use, alcohol use, coronary artery calcification, who presents for follow-up.     I saw the patient initially in 11/2023 when he presented for an evaluation of coronary artery calcification.  The patient had been having issues with shortness of breath and cough that was not getting better.  As a result a CT scan of the chest was ordered by Dr. Omar Cortes.  This was performed on 9/14/2023 and showed no acute cardiopulmonary process.  However did show evidence of increased interstitial marker rings in the periphery of the upper and lower lobes of the lungs suggestive of interstitial lung disease, moderate coronary artery calcification hepatic steatosis, and a small hiatal hernia.  He saw Dr. Al in follow-up who recommended referral to pulmonary and general surgery regarding the pulmonary and GI findings and referral to us regarding the coronary artery calcification.  In addition his lisinopril was switched to losartan to see if this would help with his cough.     It appears that the patient underwent a stress echocardiogram in 8/2014 which was negative for ischemia.  His baseline echocardiogram showed normal left ventricular systolic function wall motion with an EF of 60%, normal diastolic function and no significant valvular disease.     At time of his office visit his cough began to improve after he was started on inhaler by Dr. Cortes.  He denies any other symptoms.  After discussion with the patient he opted to proceed with a coronary calcium CT scan to see if there was any indication for statin therapy.  This was performed on 12/19/2023 and showed a total calcium score of 547.  It also did show evidence of hepatic steatosis.  I called the patient at that time and left a message  recommending initiation of low-dose atorvastatin.    He return for follow-up of his seen by KERRI Pedroza in 4/2024.  He was doing well at that time and tolerating the atorvastatin without any significant issues.    He presents back today for follow-up.  He denies any chest pain, shortness of breath, palpitation, orthopnea, near-syncope or syncope or lower extremity swelling.  He admits that he does try to get a walk but has not been good about doing this on a regular basis.  He is on Mounjaro and has been steadily losing weight.      Review of Systems   Constitutional: Negative for malaise/fatigue.   HENT:  Negative for hearing loss, hoarse voice, nosebleeds and sore throat.    Eyes:  Negative for pain.   Cardiovascular:  Negative for chest pain, claudication, cyanosis, dyspnea on exertion, irregular heartbeat, leg swelling, near-syncope, orthopnea, palpitations, paroxysmal nocturnal dyspnea and syncope.   Respiratory:  Negative for shortness of breath and snoring.    Endocrine: Negative for cold intolerance, heat intolerance, polydipsia, polyphagia and polyuria.   Skin:  Negative for itching and rash.   Musculoskeletal:  Negative for arthritis, falls, joint pain, joint swelling, muscle cramps, muscle weakness and myalgias.   Gastrointestinal:  Negative for constipation, diarrhea, dysphagia, heartburn, hematemesis, hematochezia, melena, nausea and vomiting.   Genitourinary:  Negative for frequency, hematuria and hesitancy.   Neurological:  Negative for excessive daytime sleepiness, dizziness, headaches, light-headedness, numbness and weakness.   Psychiatric/Behavioral:  Negative for depression. The patient is not nervous/anxious.          Current Outpatient Medications:     albuterol sulfate  (90 Base) MCG/ACT inhaler, INHALE 2 PUFFS BY MOUTH EVERY 4 HOURS AS NEEDED FOR WHEEZING, Disp: 6.7 g, Rfl: 0    aspirin 81 MG chewable tablet, Chew 1 tablet Daily., Disp: , Rfl:     atorvastatin (LIPITOR) 10 MG  tablet, TAKE 1 TABLET BY MOUTH DAILY, Disp: 90 tablet, Rfl: 1    empagliflozin (Jardiance) 25 MG tablet tablet, By mouth take one tab daily in AM., Disp: 90 tablet, Rfl: 2    fenofibrate (TRICOR) 145 MG tablet, By mouth take one tab daily., Disp: 90 tablet, Rfl: 2    fluticasone (FLONASE) 50 MCG/ACT nasal spray, 2 sprays into the nostril(s) as directed by provider Daily. (Patient taking differently: Administer 2 sprays into the nostril(s) as directed by provider As Needed.), Disp: 1 bottle, Rfl: 0    Fluticasone Furoate-Vilanterol 100-25 MCG/ACT aerosol powder , Inhale 1 puff Daily., Disp: 60 each, Rfl: 5    glucose blood (OneTouch Verio) test strip, Check once a day not on insulin tx, poor control, hypoglycemia. Dx: E 11.65, Disp: 100 each, Rfl: 4    losartan (COZAAR) 25 MG tablet, Take 1 tablet by mouth Daily., Disp: 90 tablet, Rfl: 1    metFORMIN ER (GLUCOPHAGE-XR) 500 MG 24 hr tablet, By mouth take 2 tabs twice daily after AM & PM meals., Disp: 360 tablet, Rfl: 1    Omega-3 Fatty Acids (FISH OIL) 1000 MG capsule capsule, Take 3 capsules by mouth Daily., Disp: , Rfl:     omeprazole (priLOSEC) 20 MG capsule, TAKE 2 CAPSULES BY MOUTH DAILY, Disp: 180 capsule, Rfl: 1    Tirzepatide 7.5 MG/0.5ML solution auto-injector, Inject 7.5 mg under the skin into the appropriate area as directed 1 (One) Time Per Week., Disp: 2 mL, Rfl: 3    valACYclovir (VALTREX) 500 MG tablet, Take 1 tablet by mouth 2 (Two) Times a Day. for 3 days, Disp: 6 tablet, Rfl: 1    Past Medical History:   Diagnosis Date    Abnormal electrocardiogram 01/19/2016    Abnormal LFTs (liver function tests)     suspected fatty liver dz    Allergic     Eczema 01/19/2016    Esophageal stricture     Essential hypertension, benign     GERD (gastroesophageal reflux disease)     Hypertriglyceridemia     Obesity     KATTY (obstructive sleep apnea)     Type 2 diabetes mellitus with hyperglycemia 2010       Past Surgical History:   Procedure Laterality Date     "COLONOSCOPY  approx 2019    benign polyps per pt     COLONOSCOPY  08/01/2023    ENDOSCOPY N/A 02/15/2021    Procedure: ESOPHAGOGASTRODUODENOSCOPY WITH REMOVAL OF FOOD BOLUS;  Surgeon: Santo Grant MD;  Location: [facility] MAIN OR;  Service: Gastroenterology;  Laterality: N/A;    ENDOSCOPY N/A 03/24/2021    Procedure: ESOPHAGOGASTRODUODENOSCOPY WITH 15-18MM BALLOON DILATATION AND BIOPSIES;  Surgeon: Santo Grant MD;  Location: [facility] ENDOSCOPY;  Service: Gastroenterology;  Laterality: N/A;  PRE- ESOPHAGEAL STRICTURE  POST- HIATAL HERNIA, ESOPHAGEAL STRICTURE, GASTRITIS    ENDOSCOPY N/A 1/26/2024    Procedure: ESOPHAGOGASTRODUODENOSCOPY with biopsies;  Surgeon: Christopher Taylor MD;  Location: [facility] [place] MAIN OR;  Service: Gastroenterology;  Laterality: N/A;  GERD    LASIK      VASECTOMY         Family History   Problem Relation Age of Onset    Diabetes Father     Diabetes Brother     Hyperlipidemia Brother     Cancer Maternal Grandmother     Alcohol abuse Maternal Grandfather     Cancer Paternal Grandmother     Diabetes Paternal Grandfather     Diabetes Sister     Diabetes Brother     Malig Hyperthermia Neg Hx        Social History     Tobacco Use    Smoking status: Never     Passive exposure: Never    Smokeless tobacco: Never    Tobacco comments:     Less than 1x per month   Vaping Use    Vaping status: Never Used   Substance Use Topics    Alcohol use: Yes     Comment: SOC    Drug use: No         ECG 12 Lead    Date/Time: 5/6/2025 11:58 AM  Performed by: Leana Santana MD    Authorized by: Leana Santana MD  Comparison: compared with previous ECG   Similar to previous ECG  Rhythm: sinus rhythm  Conduction: 1st degree AV block             Objective:     Visit Vitals  /64 (BP Location: Left arm, Patient Position: Sitting, Cuff Size: Large Adult)   Pulse 75   Ht 177.8 cm (70\")   Wt 113 kg (250 lb)   BMI 35.87 kg/m²         Constitutional:       Appearance: Normal appearance. Well-developed.   HENT:    "   Head: Normocephalic and atraumatic.   Neck:      Vascular: No carotid bruit or JVD.   Pulmonary:      Effort: Pulmonary effort is normal.      Breath sounds: Normal breath sounds.   Cardiovascular:      Normal rate. Regular rhythm.      No gallop.    Pulses:     Radial: 2+ bilaterally.  Edema:     Peripheral edema absent.   Abdominal:      Palpations: Abdomen is soft.   Skin:     General: Skin is warm and dry.   Neurological:      Mental Status: Alert and oriented to person, place, and time.             Assessment:          Diagnosis Plan   1. Coronary artery calcification seen on CT scan        2. Essential hypertension, benign        3. Hypertriglyceridemia        4. Type 2 diabetes mellitus with hyperglycemia, unspecified whether long term insulin use        5. KATTY (obstructive sleep apnea)               Plan:         1.  Coronary artery calcification.  No symptoms to suggest obstructive coronary artery disease.  EKG remains unremarkable.  Continue medical management with aspirin and statin.  2.  Hypertension.  Well-controlled on current regimen medications.  Continue same.  3.  Hyperlipidemia.  On atorvastatin for goal DL of less than 70.  Most recent LDL was at goal at 48.  Continue current management.  4.  Diabetes mellitus type 2  5.  Obstructive sleep apnea.  Compliant with CPAP.  6.  Obesity.  The patient has been steadily losing weight with Mounjaro.  Encouraged him to also participate in routine exercise with continuing to walk and engage in some strength training.    Will plan on seeing the patient back again in 1 year or sooner if further issues arise.

## 2025-05-06 ENCOUNTER — OFFICE VISIT (OUTPATIENT)
Age: 60
End: 2025-05-06
Payer: COMMERCIAL

## 2025-05-06 VITALS
WEIGHT: 250 LBS | DIASTOLIC BLOOD PRESSURE: 64 MMHG | BODY MASS INDEX: 35.79 KG/M2 | HEIGHT: 70 IN | HEART RATE: 75 BPM | SYSTOLIC BLOOD PRESSURE: 110 MMHG

## 2025-05-06 DIAGNOSIS — E78.1 HYPERTRIGLYCERIDEMIA: ICD-10-CM

## 2025-05-06 DIAGNOSIS — I10 ESSENTIAL HYPERTENSION, BENIGN: ICD-10-CM

## 2025-05-06 DIAGNOSIS — I25.10 CORONARY ARTERY CALCIFICATION SEEN ON CT SCAN: Primary | ICD-10-CM

## 2025-05-06 DIAGNOSIS — G47.33 OSA (OBSTRUCTIVE SLEEP APNEA): ICD-10-CM

## 2025-05-06 DIAGNOSIS — E11.65 TYPE 2 DIABETES MELLITUS WITH HYPERGLYCEMIA, UNSPECIFIED WHETHER LONG TERM INSULIN USE: ICD-10-CM

## 2025-05-06 PROCEDURE — 93000 ELECTROCARDIOGRAM COMPLETE: CPT | Performed by: INTERNAL MEDICINE

## 2025-05-06 PROCEDURE — 99214 OFFICE O/P EST MOD 30 MIN: CPT | Performed by: INTERNAL MEDICINE

## 2025-05-06 RX ORDER — ATORVASTATIN CALCIUM 10 MG/1
10 TABLET, FILM COATED ORAL DAILY
Qty: 90 TABLET | Refills: 3 | Status: SHIPPED | OUTPATIENT
Start: 2025-05-06

## 2025-05-06 NOTE — LETTER
May 6, 2025     No Recipients    Patient: Rashid Henderson   YOB: 1965   Date of Visit: 5/6/2025       Dear No Recipients    Rashid Henderson was in my office today. Below is a copy of my note.    If you have questions, please do not hesitate to call me. I look forward to following Rashid along with you.         Sincerely,        Leana Santana MD        CC: No Recipients        Subjective:     Encounter Date:05/06/2025      Patient ID: Rashid Henderson is a 59 y.o. male.    Chief Complaint:  History of Present Illness    This is a 59-year-old with hypertension, hyperlipidemia, diabetes mellitus type 2, tobacco use, alcohol use, coronary artery calcification, who presents for follow-up.     I saw the patient initially in 11/2023 when he presented for an evaluation of coronary artery calcification.  The patient had been having issues with shortness of breath and cough that was not getting better.  As a result a CT scan of the chest was ordered by Dr. Omar Cortes.  This was performed on 9/14/2023 and showed no acute cardiopulmonary process.  However did show evidence of increased interstitial marker rings in the periphery of the upper and lower lobes of the lungs suggestive of interstitial lung disease, moderate coronary artery calcification hepatic steatosis, and a small hiatal hernia.  He saw Dr. Al in follow-up who recommended referral to pulmonary and general surgery regarding the pulmonary and GI findings and referral to us regarding the coronary artery calcification.  In addition his lisinopril was switched to losartan to see if this would help with his cough.     It appears that the patient underwent a stress echocardiogram in 8/2014 which was negative for ischemia.  His baseline echocardiogram showed normal left ventricular systolic function wall motion with an EF of 60%, normal diastolic function and no significant valvular disease.     At time of his office visit his cough began to improve after he was  started on inhaler by Dr. Cortes.  He denies any other symptoms.  After discussion with the patient he opted to proceed with a coronary calcium CT scan to see if there was any indication for statin therapy.  This was performed on 12/19/2023 and showed a total calcium score of 547.  It also did show evidence of hepatic steatosis.  I called the patient at that time and left a message recommending initiation of low-dose atorvastatin.    He return for follow-up of his seen by KERRI Pedroza in 4/2024.  He was doing well at that time and tolerating the atorvastatin without any significant issues.    He presents back today for follow-up.  He denies any chest pain, shortness of breath, palpitation, orthopnea, near-syncope or syncope or lower extremity swelling.  He admits that he does try to get a walk but has not been good about doing this on a regular basis.  He is on Mounjaro and has been steadily losing weight.      Review of Systems   Constitutional: Negative for malaise/fatigue.   HENT:  Negative for hearing loss, hoarse voice, nosebleeds and sore throat.    Eyes:  Negative for pain.   Cardiovascular:  Negative for chest pain, claudication, cyanosis, dyspnea on exertion, irregular heartbeat, leg swelling, near-syncope, orthopnea, palpitations, paroxysmal nocturnal dyspnea and syncope.   Respiratory:  Negative for shortness of breath and snoring.    Endocrine: Negative for cold intolerance, heat intolerance, polydipsia, polyphagia and polyuria.   Skin:  Negative for itching and rash.   Musculoskeletal:  Negative for arthritis, falls, joint pain, joint swelling, muscle cramps, muscle weakness and myalgias.   Gastrointestinal:  Negative for constipation, diarrhea, dysphagia, heartburn, hematemesis, hematochezia, melena, nausea and vomiting.   Genitourinary:  Negative for frequency, hematuria and hesitancy.   Neurological:  Negative for excessive daytime sleepiness, dizziness, headaches, light-headedness, numbness and  weakness.   Psychiatric/Behavioral:  Negative for depression. The patient is not nervous/anxious.          Current Outpatient Medications:   •  albuterol sulfate  (90 Base) MCG/ACT inhaler, INHALE 2 PUFFS BY MOUTH EVERY 4 HOURS AS NEEDED FOR WHEEZING, Disp: 6.7 g, Rfl: 0  •  aspirin 81 MG chewable tablet, Chew 1 tablet Daily., Disp: , Rfl:   •  atorvastatin (LIPITOR) 10 MG tablet, TAKE 1 TABLET BY MOUTH DAILY, Disp: 90 tablet, Rfl: 1  •  empagliflozin (Jardiance) 25 MG tablet tablet, By mouth take one tab daily in AM., Disp: 90 tablet, Rfl: 2  •  fenofibrate (TRICOR) 145 MG tablet, By mouth take one tab daily., Disp: 90 tablet, Rfl: 2  •  fluticasone (FLONASE) 50 MCG/ACT nasal spray, 2 sprays into the nostril(s) as directed by provider Daily. (Patient taking differently: Administer 2 sprays into the nostril(s) as directed by provider As Needed.), Disp: 1 bottle, Rfl: 0  •  Fluticasone Furoate-Vilanterol 100-25 MCG/ACT aerosol powder , Inhale 1 puff Daily., Disp: 60 each, Rfl: 5  •  glucose blood (OneTouch Verio) test strip, Check once a day not on insulin tx, poor control, hypoglycemia. Dx: E 11.65, Disp: 100 each, Rfl: 4  •  losartan (COZAAR) 25 MG tablet, Take 1 tablet by mouth Daily., Disp: 90 tablet, Rfl: 1  •  metFORMIN ER (GLUCOPHAGE-XR) 500 MG 24 hr tablet, By mouth take 2 tabs twice daily after AM & PM meals., Disp: 360 tablet, Rfl: 1  •  Omega-3 Fatty Acids (FISH OIL) 1000 MG capsule capsule, Take 3 capsules by mouth Daily., Disp: , Rfl:   •  omeprazole (priLOSEC) 20 MG capsule, TAKE 2 CAPSULES BY MOUTH DAILY, Disp: 180 capsule, Rfl: 1  •  Tirzepatide 7.5 MG/0.5ML solution auto-injector, Inject 7.5 mg under the skin into the appropriate area as directed 1 (One) Time Per Week., Disp: 2 mL, Rfl: 3  •  valACYclovir (VALTREX) 500 MG tablet, Take 1 tablet by mouth 2 (Two) Times a Day. for 3 days, Disp: 6 tablet, Rfl: 1    Past Medical History:   Diagnosis Date   • Abnormal electrocardiogram 01/19/2016   •  Abnormal LFTs (liver function tests)     suspected fatty liver dz   • Allergic    • Eczema 01/19/2016   • Esophageal stricture    • Essential hypertension, benign    • GERD (gastroesophageal reflux disease)    • Hypertriglyceridemia    • Obesity    • KATTY (obstructive sleep apnea)    • Type 2 diabetes mellitus with hyperglycemia 2010       Past Surgical History:   Procedure Laterality Date   • COLONOSCOPY  approx 2019    benign polyps per pt    • COLONOSCOPY  08/01/2023   • ENDOSCOPY N/A 02/15/2021    Procedure: ESOPHAGOGASTRODUODENOSCOPY WITH REMOVAL OF FOOD BOLUS;  Surgeon: Santo Grant MD;  Location: [facility] MAIN OR;  Service: Gastroenterology;  Laterality: N/A;   • ENDOSCOPY N/A 03/24/2021    Procedure: ESOPHAGOGASTRODUODENOSCOPY WITH 15-18MM BALLOON DILATATION AND BIOPSIES;  Surgeon: Santo Grant MD;  Location: [facility] ENDOSCOPY;  Service: Gastroenterology;  Laterality: N/A;  PRE- ESOPHAGEAL STRICTURE  POST- HIATAL HERNIA, ESOPHAGEAL STRICTURE, GASTRITIS   • ENDOSCOPY N/A 1/26/2024    Procedure: ESOPHAGOGASTRODUODENOSCOPY with biopsies;  Surgeon: Christopher Taylor MD;  Location: [facility] [place];  Service: Gastroenterology;  Laterality: N/A;  GERD   • LASIK     • VASECTOMY         Family History   Problem Relation Age of Onset   • Diabetes Father    • Diabetes Brother    • Hyperlipidemia Brother    • Cancer Maternal Grandmother    • Alcohol abuse Maternal Grandfather    • Cancer Paternal Grandmother    • Diabetes Paternal Grandfather    • Diabetes Sister    • Diabetes Brother    • Malig Hyperthermia Neg Hx        Social History     Tobacco Use   • Smoking status: Never     Passive exposure: Never   • Smokeless tobacco: Never   • Tobacco comments:     Less than 1x per month   Vaping Use   • Vaping status: Never Used   Substance Use Topics   • Alcohol use: Yes     Comment: SOC   • Drug use: No         ECG 12 Lead    Date/Time: 5/6/2025 11:58 AM  Performed by: Leaan Santana MD    Authorized by: Esther  "MD Leana  Comparison: compared with previous ECG   Similar to previous ECG  Rhythm: sinus rhythm  Conduction: 1st degree AV block             Objective:     Visit Vitals  /64 (BP Location: Left arm, Patient Position: Sitting, Cuff Size: Large Adult)   Pulse 75   Ht 177.8 cm (70\")   Wt 113 kg (250 lb)   BMI 35.87 kg/m²         Constitutional:       Appearance: Normal appearance. Well-developed.   HENT:      Head: Normocephalic and atraumatic.   Neck:      Vascular: No carotid bruit or JVD.   Pulmonary:      Effort: Pulmonary effort is normal.      Breath sounds: Normal breath sounds.   Cardiovascular:      Normal rate. Regular rhythm.      No gallop.    Pulses:     Radial: 2+ bilaterally.  Edema:     Peripheral edema absent.   Abdominal:      Palpations: Abdomen is soft.   Skin:     General: Skin is warm and dry.   Neurological:      Mental Status: Alert and oriented to person, place, and time.             Assessment:          Diagnosis Plan   1. Coronary artery calcification seen on CT scan        2. Essential hypertension, benign        3. Hypertriglyceridemia        4. Type 2 diabetes mellitus with hyperglycemia, unspecified whether long term insulin use        5. KATTY (obstructive sleep apnea)               Plan:         1.  Coronary artery calcification.  No symptoms to suggest obstructive coronary artery disease.  EKG remains unremarkable.  Continue medical management with aspirin and statin.  2.  Hypertension.  Well-controlled on current regimen medications.  Continue same.  3.  Hyperlipidemia.  On atorvastatin for goal DL of less than 70.  Most recent LDL was at goal at 48.  Continue current management.  4.  Diabetes mellitus type 2  5.  Obstructive sleep apnea.  Compliant with CPAP.  6.  Obesity.  The patient has been steadily losing weight with Mounjaro.  Encouraged him to also participate in routine exercise with continuing to walk and engage in some strength training.    Will plan on seeing the " patient back again in 1 year or sooner if further issues arise.

## 2025-05-06 NOTE — LETTER
May 6, 2025     Nyla Al MD  [address]  [address]  [facility] [address]    Patient: Rashid Henderson   YOB: 1965   Date of Visit: 5/6/2025       Dear Nyla Al MD    Rashid Henderson was in my office today. Below is a copy of my note.    If you have questions, please do not hesitate to call me. I look forward to following Rashid along with you.         Sincerely,        Leana Santana MD        CC: No Recipients        Subjective:     Encounter Date:05/06/2025      Patient ID: Rashid Henderson is a 59 y.o. male.    Chief Complaint:  History of Present Illness    This is a 59-year-old with hypertension, hyperlipidemia, diabetes mellitus type 2, tobacco use, alcohol use, coronary artery calcification, who presents for follow-up.     I saw the patient initially in 11/2023 when he presented for an evaluation of coronary artery calcification.  The patient had been having issues with shortness of breath and cough that was not getting better.  As a result a CT scan of the chest was ordered by Dr. Omar Cortes.  This was performed on 9/14/2023 and showed no acute cardiopulmonary process.  However did show evidence of increased interstitial marker rings in the periphery of the upper and lower lobes of the lungs suggestive of interstitial lung disease, moderate coronary artery calcification hepatic steatosis, and a small hiatal hernia.  He saw Dr. Al in follow-up who recommended referral to pulmonary and general surgery regarding the pulmonary and GI findings and referral to us regarding the coronary artery calcification.  In addition his lisinopril was switched to losartan to see if this would help with his cough.     It appears that the patient underwent a stress echocardiogram in 8/2014 which was negative for ischemia.  His baseline echocardiogram showed normal left ventricular systolic function wall motion with an EF of 60%, normal diastolic function and no significant valvular disease.     At  time of his office visit his cough began to improve after he was started on inhaler by Dr. Cortes.  He denies any other symptoms.  After discussion with the patient he opted to proceed with a coronary calcium CT scan to see if there was any indication for statin therapy.  This was performed on 12/19/2023 and showed a total calcium score of 547.  It also did show evidence of hepatic steatosis.  I called the patient at that time and left a message recommending initiation of low-dose atorvastatin.    He return for follow-up of his seen by KERRI Pedroza in 4/2024.  He was doing well at that time and tolerating the atorvastatin without any significant issues.    He presents back today for follow-up.  He denies any chest pain, shortness of breath, palpitation, orthopnea, near-syncope or syncope or lower extremity swelling.  He admits that he does try to get a walk but has not been good about doing this on a regular basis.  He is on Mounjaro and has been steadily losing weight.      Review of Systems   Constitutional: Negative for malaise/fatigue.   HENT:  Negative for hearing loss, hoarse voice, nosebleeds and sore throat.    Eyes:  Negative for pain.   Cardiovascular:  Negative for chest pain, claudication, cyanosis, dyspnea on exertion, irregular heartbeat, leg swelling, near-syncope, orthopnea, palpitations, paroxysmal nocturnal dyspnea and syncope.   Respiratory:  Negative for shortness of breath and snoring.    Endocrine: Negative for cold intolerance, heat intolerance, polydipsia, polyphagia and polyuria.   Skin:  Negative for itching and rash.   Musculoskeletal:  Negative for arthritis, falls, joint pain, joint swelling, muscle cramps, muscle weakness and myalgias.   Gastrointestinal:  Negative for constipation, diarrhea, dysphagia, heartburn, hematemesis, hematochezia, melena, nausea and vomiting.   Genitourinary:  Negative for frequency, hematuria and hesitancy.   Neurological:  Negative for excessive daytime  sleepiness, dizziness, headaches, light-headedness, numbness and weakness.   Psychiatric/Behavioral:  Negative for depression. The patient is not nervous/anxious.          Current Outpatient Medications:   •  albuterol sulfate  (90 Base) MCG/ACT inhaler, INHALE 2 PUFFS BY MOUTH EVERY 4 HOURS AS NEEDED FOR WHEEZING, Disp: 6.7 g, Rfl: 0  •  aspirin 81 MG chewable tablet, Chew 1 tablet Daily., Disp: , Rfl:   •  atorvastatin (LIPITOR) 10 MG tablet, TAKE 1 TABLET BY MOUTH DAILY, Disp: 90 tablet, Rfl: 1  •  empagliflozin (Jardiance) 25 MG tablet tablet, By mouth take one tab daily in AM., Disp: 90 tablet, Rfl: 2  •  fenofibrate (TRICOR) 145 MG tablet, By mouth take one tab daily., Disp: 90 tablet, Rfl: 2  •  fluticasone (FLONASE) 50 MCG/ACT nasal spray, 2 sprays into the nostril(s) as directed by provider Daily. (Patient taking differently: Administer 2 sprays into the nostril(s) as directed by provider As Needed.), Disp: 1 bottle, Rfl: 0  •  Fluticasone Furoate-Vilanterol 100-25 MCG/ACT aerosol powder , Inhale 1 puff Daily., Disp: 60 each, Rfl: 5  •  glucose blood (OneTouch Verio) test strip, Check once a day not on insulin tx, poor control, hypoglycemia. Dx: E 11.65, Disp: 100 each, Rfl: 4  •  losartan (COZAAR) 25 MG tablet, Take 1 tablet by mouth Daily., Disp: 90 tablet, Rfl: 1  •  metFORMIN ER (GLUCOPHAGE-XR) 500 MG 24 hr tablet, By mouth take 2 tabs twice daily after AM & PM meals., Disp: 360 tablet, Rfl: 1  •  Omega-3 Fatty Acids (FISH OIL) 1000 MG capsule capsule, Take 3 capsules by mouth Daily., Disp: , Rfl:   •  omeprazole (priLOSEC) 20 MG capsule, TAKE 2 CAPSULES BY MOUTH DAILY, Disp: 180 capsule, Rfl: 1  •  Tirzepatide 7.5 MG/0.5ML solution auto-injector, Inject 7.5 mg under the skin into the appropriate area as directed 1 (One) Time Per Week., Disp: 2 mL, Rfl: 3  •  valACYclovir (VALTREX) 500 MG tablet, Take 1 tablet by mouth 2 (Two) Times a Day. for 3 days, Disp: 6 tablet, Rfl: 1    Past Medical  History:   Diagnosis Date   • Abnormal electrocardiogram 01/19/2016   • Abnormal LFTs (liver function tests)     suspected fatty liver dz   • Allergic    • Eczema 01/19/2016   • Esophageal stricture    • Essential hypertension, benign    • GERD (gastroesophageal reflux disease)    • Hypertriglyceridemia    • Obesity    • KATTY (obstructive sleep apnea)    • Type 2 diabetes mellitus with hyperglycemia 2010       Past Surgical History:   Procedure Laterality Date   • COLONOSCOPY  approx 2019    benign polyps per pt    • COLONOSCOPY  08/01/2023   • ENDOSCOPY N/A 02/15/2021    Procedure: ESOPHAGOGASTRODUODENOSCOPY WITH REMOVAL OF FOOD BOLUS;  Surgeon: Santo Grant MD;  Location: [facility] MAIN OR;  Service: Gastroenterology;  Laterality: N/A;   • ENDOSCOPY N/A 03/24/2021    Procedure: ESOPHAGOGASTRODUODENOSCOPY WITH 15-18MM BALLOON DILATATION AND BIOPSIES;  Surgeon: Santo Grant MD;  Location: [facility] ENDOSCOPY;  Service: Gastroenterology;  Laterality: N/A;  PRE- ESOPHAGEAL STRICTURE  POST- HIATAL HERNIA, ESOPHAGEAL STRICTURE, GASTRITIS   • ENDOSCOPY N/A 1/26/2024    Procedure: ESOPHAGOGASTRODUODENOSCOPY with biopsies;  Surgeon: Christopher Taylor MD;  Location: [facility] MA[facility] OR;  Service: Gastroenterology;  Laterality: N/A;  GERD   • LASIK     • VASECTOMY         Family History   Problem Relation Age of Onset   • Diabetes Father    • Diabetes Brother    • Hyperlipidemia Brother    • Cancer Maternal Grandmother    • Alcohol abuse Maternal Grandfather    • Cancer Paternal Grandmother    • Diabetes Paternal Grandfather    • Diabetes Sister    • Diabetes Brother    • Malig Hyperthermia Neg Hx        Social History     Tobacco Use   • Smoking status: Never     Passive exposure: Never   • Smokeless tobacco: Never   • Tobacco comments:     Less than 1x per month   Vaping Use   • Vaping status: Never Used   Substance Use Topics   • Alcohol use: Yes     Comment: SOC   • Drug use: No         ECG 12 Lead    Date/Time:  "5/6/2025 11:58 AM  Performed by: Leana Santana MD    Authorized by: Leana Santana MD  Comparison: compared with previous ECG   Similar to previous ECG  Rhythm: sinus rhythm  Conduction: 1st degree AV block             Objective:     Visit Vitals  /64 (BP Location: Left arm, Patient Position: Sitting, Cuff Size: Large Adult)   Pulse 75   Ht 177.8 cm (70\")   Wt 113 kg (250 lb)   BMI 35.87 kg/m²         Constitutional:       Appearance: Normal appearance. Well-developed.   HENT:      Head: Normocephalic and atraumatic.   Neck:      Vascular: No carotid bruit or JVD.   Pulmonary:      Effort: Pulmonary effort is normal.      Breath sounds: Normal breath sounds.   Cardiovascular:      Normal rate. Regular rhythm.      No gallop.    Pulses:     Radial: 2+ bilaterally.  Edema:     Peripheral edema absent.   Abdominal:      Palpations: Abdomen is soft.   Skin:     General: Skin is warm and dry.   Neurological:      Mental Status: Alert and oriented to person, place, and time.             Assessment:          Diagnosis Plan   1. Coronary artery calcification seen on CT scan        2. Essential hypertension, benign        3. Hypertriglyceridemia        4. Type 2 diabetes mellitus with hyperglycemia, unspecified whether long term insulin use        5. KATTY (obstructive sleep apnea)               Plan:         1.  Coronary artery calcification.  No symptoms to suggest obstructive coronary artery disease.  EKG remains unremarkable.  Continue medical management with aspirin and statin.  2.  Hypertension.  Well-controlled on current regimen medications.  Continue same.  3.  Hyperlipidemia.  On atorvastatin for goal DL of less than 70.  Most recent LDL was at goal at 48.  Continue current management.  4.  Diabetes mellitus type 2  5.  Obstructive sleep apnea.  Compliant with CPAP.  6.  Obesity.  The patient has been steadily losing weight with Mounjaro.  Encouraged him to also participate in routine exercise with " continuing to walk and engage in some strength training.    Will plan on seeing the patient back again in 1 year or sooner if further issues arise.

## 2025-06-02 RX ORDER — OMEPRAZOLE 20 MG/1
40 CAPSULE, DELAYED RELEASE ORAL DAILY
Qty: 180 CAPSULE | Refills: 1 | Status: SHIPPED | OUTPATIENT
Start: 2025-06-02

## 2025-06-06 DIAGNOSIS — E11.65 TYPE 2 DIABETES MELLITUS WITH HYPERGLYCEMIA, UNSPECIFIED WHETHER LONG TERM INSULIN USE: ICD-10-CM

## 2025-06-06 NOTE — TELEPHONE ENCOUNTER
Rx Refill Note  Requested Prescriptions     Pending Prescriptions Disp Refills    empagliflozin (Jardiance) 25 MG tablet tablet 90 tablet 1     Sig: By mouth take one tab daily in AM.      Last office visit with prescribing clinician: 2/4/2025   Last telemedicine visit with prescribing clinician: Visit date not found   Next office visit with prescribing clinician: 8/5/2025                         Would you like a call back once the refill request has been completed: [] Yes [] No    If the office needs to give you a call back, can they leave a voicemail: [] Yes [] No    Stewart Quiñonez MA  06/06/25, 10:14 EDT

## 2025-06-19 DIAGNOSIS — E11.65 TYPE 2 DIABETES MELLITUS WITH HYPERGLYCEMIA, UNSPECIFIED WHETHER LONG TERM INSULIN USE: ICD-10-CM

## 2025-06-19 NOTE — TELEPHONE ENCOUNTER
Caller: Rashid Henderson    Relationship: Self    Best call back number:     [phone]       Requested Prescriptions:   Requested Prescriptions     Pending Prescriptions Disp Refills    empagliflozin (Jardiance) 25 MG tablet tablet 90 tablet 1     Sig: By mouth take one tab daily in AM.        Pharmacy where request should be sent: [facility] - [facility] [address] - [phone]  - [phone] FX     Last office visit with prescribing clinician: 2/4/2025   Last telemedicine visit with prescribing clinician: Visit date not found   Next office visit with prescribing clinician: 8/5/2025     Additional details provided by patient: OUT OF MEDICATION    Does the patient have less than a 3 day supply:  [x] Yes  [] No    Would you like a call back once the refill request has been completed: [] Yes [] No    If the office needs to give you a call back, can they leave a voicemail: [] Yes [] No    Herbert Alvarez Rep   06/19/25 09:56 EDT

## 2025-06-19 NOTE — TELEPHONE ENCOUNTER
Hub staff attempted to follow warm transfer process and was unsuccessful     Caller: Rashid Henderson    Relationship to patient: Self    Best call back number: [phone]     Patient is needing:       PATIENT IS RETURNING MOISES'S CALL.  PLEASE CALL HIM BACK

## 2025-06-25 DIAGNOSIS — E78.1 HYPERTRIGLYCERIDEMIA: ICD-10-CM

## 2025-06-25 NOTE — TELEPHONE ENCOUNTER
Pt is already scheduled for 08/05/25 for 6mon f/u. No availability for a Physical appt around that time. Please advise if its okay to keep OV on 08/05/25 or if Pt needs to be worked in for physical. Thank you!

## 2025-06-27 RX ORDER — FENOFIBRATE 145 MG/1
145 TABLET, FILM COATED ORAL DAILY
Qty: 90 TABLET | Refills: 1 | Status: SHIPPED | OUTPATIENT
Start: 2025-06-27

## 2025-07-25 DIAGNOSIS — E78.1 HYPERTRIGLYCERIDEMIA: ICD-10-CM

## 2025-07-25 DIAGNOSIS — E78.00 HYPERCHOLESTEROLEMIA: ICD-10-CM

## 2025-07-25 DIAGNOSIS — E11.65 TYPE 2 DIABETES MELLITUS WITH HYPERGLYCEMIA, UNSPECIFIED WHETHER LONG TERM INSULIN USE: ICD-10-CM

## 2025-07-25 DIAGNOSIS — I10 ESSENTIAL HYPERTENSION: ICD-10-CM

## 2025-07-30 LAB
ALBUMIN SERPL-MCNC: 4.6 G/DL (ref 3.8–4.9)
ALBUMIN/CREAT UR: 4 MG/G CREAT (ref 0–29)
ALP SERPL-CCNC: 46 IU/L (ref 44–121)
ALT SERPL-CCNC: 27 IU/L (ref 0–44)
AST SERPL-CCNC: 25 IU/L (ref 0–40)
BASOPHILS # BLD AUTO: 0 X10E3/UL (ref 0–0.2)
BASOPHILS NFR BLD AUTO: 1 %
BILIRUB SERPL-MCNC: 0.4 MG/DL (ref 0–1.2)
BUN SERPL-MCNC: 19 MG/DL (ref 6–24)
BUN/CREAT SERPL: 17 (ref 9–20)
CALCIUM SERPL-MCNC: 10.1 MG/DL (ref 8.7–10.2)
CHLORIDE SERPL-SCNC: 100 MMOL/L (ref 96–106)
CHOLEST SERPL-MCNC: 150 MG/DL (ref 100–199)
CK SERPL-CCNC: 78 U/L (ref 41–331)
CO2 SERPL-SCNC: 24 MMOL/L (ref 20–29)
CREAT SERPL-MCNC: 1.09 MG/DL (ref 0.76–1.27)
CREAT UR-MCNC: 118.8 MG/DL
EGFRCR SERPLBLD CKD-EPI 2021: 79 ML/MIN/1.73
EOSINOPHIL # BLD AUTO: 0.4 X10E3/UL (ref 0–0.4)
EOSINOPHIL NFR BLD AUTO: 6 %
ERYTHROCYTE [DISTWIDTH] IN BLOOD BY AUTOMATED COUNT: 13.1 % (ref 11.6–15.4)
GLOBULIN SER CALC-MCNC: 2.7 G/DL (ref 1.5–4.5)
GLUCOSE SERPL-MCNC: 121 MG/DL (ref 70–99)
HBA1C MFR BLD: 7 % (ref 4.8–5.6)
HCT VFR BLD AUTO: 49.2 % (ref 37.5–51)
HDLC SERPL-MCNC: 75 MG/DL
HGB BLD-MCNC: 15.7 G/DL (ref 13–17.7)
IMM GRANULOCYTES # BLD AUTO: 0 X10E3/UL (ref 0–0.1)
IMM GRANULOCYTES NFR BLD AUTO: 0 %
LDLC SERPL CALC-MCNC: 56 MG/DL (ref 0–99)
LDLC/HDLC SERPL: 0.7 RATIO (ref 0–3.6)
LIPASE SERPL-CCNC: 23 U/L (ref 13–78)
LYMPHOCYTES # BLD AUTO: 2.6 X10E3/UL (ref 0.7–3.1)
LYMPHOCYTES NFR BLD AUTO: 37 %
MCH RBC QN AUTO: 29.6 PG (ref 26.6–33)
MCHC RBC AUTO-ENTMCNC: 31.9 G/DL (ref 31.5–35.7)
MCV RBC AUTO: 93 FL (ref 79–97)
MICROALBUMIN UR-MCNC: 4.3 UG/ML
MONOCYTES # BLD AUTO: 0.5 X10E3/UL (ref 0.1–0.9)
MONOCYTES NFR BLD AUTO: 7 %
NEUTROPHILS # BLD AUTO: 3.6 X10E3/UL (ref 1.4–7)
NEUTROPHILS NFR BLD AUTO: 49 %
PLATELET # BLD AUTO: 327 X10E3/UL (ref 150–450)
POTASSIUM SERPL-SCNC: 4.8 MMOL/L (ref 3.5–5.2)
PROT SERPL-MCNC: 7.3 G/DL (ref 6–8.5)
RBC # BLD AUTO: 5.31 X10E6/UL (ref 4.14–5.8)
SODIUM SERPL-SCNC: 140 MMOL/L (ref 134–144)
T4 FREE SERPL-MCNC: 1.66 NG/DL (ref 0.82–1.77)
TRIGL SERPL-MCNC: 109 MG/DL (ref 0–149)
TSH SERPL DL<=0.005 MIU/L-ACNC: 4.02 UIU/ML (ref 0.45–4.5)
VLDLC SERPL CALC-MCNC: 19 MG/DL (ref 5–40)
WBC # BLD AUTO: 7.2 X10E3/UL (ref 3.4–10.8)

## 2025-08-05 ENCOUNTER — OFFICE VISIT (OUTPATIENT)
Dept: FAMILY MEDICINE CLINIC | Facility: CLINIC | Age: 60
End: 2025-08-05
Payer: COMMERCIAL

## 2025-08-05 VITALS
BODY MASS INDEX: 34.03 KG/M2 | SYSTOLIC BLOOD PRESSURE: 126 MMHG | HEIGHT: 70 IN | DIASTOLIC BLOOD PRESSURE: 76 MMHG | OXYGEN SATURATION: 97 % | HEART RATE: 87 BPM | WEIGHT: 237.7 LBS

## 2025-08-05 DIAGNOSIS — I10 ESSENTIAL HYPERTENSION, BENIGN: ICD-10-CM

## 2025-08-05 DIAGNOSIS — E78.00 HYPERCHOLESTEROLEMIA: ICD-10-CM

## 2025-08-05 DIAGNOSIS — I10 ESSENTIAL HYPERTENSION: ICD-10-CM

## 2025-08-05 DIAGNOSIS — Z00.00 WELL ADULT EXAM: ICD-10-CM

## 2025-08-05 DIAGNOSIS — Z12.5 PROSTATE CANCER SCREENING: ICD-10-CM

## 2025-08-05 DIAGNOSIS — E11.65 TYPE 2 DIABETES MELLITUS WITH HYPERGLYCEMIA, UNSPECIFIED WHETHER LONG TERM INSULIN USE: Primary | ICD-10-CM

## 2025-08-05 PROCEDURE — 99214 OFFICE O/P EST MOD 30 MIN: CPT | Performed by: INTERNAL MEDICINE

## 2025-08-05 RX ORDER — LOSARTAN POTASSIUM 25 MG/1
25 TABLET ORAL DAILY
Qty: 90 TABLET | Refills: 1 | Status: SHIPPED | OUTPATIENT
Start: 2025-08-05

## (undated) DEVICE — BITEBLOCK OMNI BLOC

## (undated) DEVICE — LN SMPL CO2 SHTRM SD STREAM W/M LUER

## (undated) DEVICE — ADAPT CLN BIOGUARD AIR/H2O DISP

## (undated) DEVICE — SENSR O2 OXIMAX FNGR A/ 18IN NONSTR

## (undated) DEVICE — FRCP BX RADJAW4 NDL 2.8 240CM LG OG BX40

## (undated) DEVICE — KT ORCA ORCAPOD DISP STRL

## (undated) DEVICE — Device

## (undated) DEVICE — VIAL FORMLN CAP 10PCT 20ML

## (undated) DEVICE — FRCP BX RADJAW4 NDL 2.8 240CM LG OG BX80

## (undated) DEVICE — MSK ENDO PORT O2 POM ELITE CURAPLEX A/

## (undated) DEVICE — BALN DIL ESOPH CRE CONTRL RADL 15/18MM 8CM BX5

## (undated) DEVICE — GOWN SURG SIRUS NONREINF W/SLV 3XL STRL

## (undated) DEVICE — THE DISPOSABLE RAPTOR GRASPING DEVICE IS USED TO GRASP TISSUE AND/OR RETRIEVE FOREIGN BODIES, EXCISED TISSUE AND STENTS DURING ENDOSCOPIC PROCEDURES.: Brand: RAPTOR

## (undated) DEVICE — ADAPT CLN SCPE ENDO PORPOISE BX/50 DISP

## (undated) DEVICE — DEV INFL CRE STERIFLATE 60CC DISP

## (undated) DEVICE — TUBING, SUCTION, 1/4" X 10', STRAIGHT: Brand: MEDLINE

## (undated) DEVICE — CANN O2 ETCO2 FITS ALL CONN CO2 SMPL A/ 7IN DISP LF